# Patient Record
Sex: FEMALE | Race: WHITE | NOT HISPANIC OR LATINO | Employment: OTHER | URBAN - METROPOLITAN AREA
[De-identification: names, ages, dates, MRNs, and addresses within clinical notes are randomized per-mention and may not be internally consistent; named-entity substitution may affect disease eponyms.]

---

## 2017-01-16 ENCOUNTER — COMMUNICATION - HEALTHEAST (OUTPATIENT)
Dept: FAMILY MEDICINE | Facility: CLINIC | Age: 72
End: 2017-01-16

## 2017-01-16 DIAGNOSIS — K21.9 GERD (GASTROESOPHAGEAL REFLUX DISEASE): ICD-10-CM

## 2017-01-17 ENCOUNTER — COMMUNICATION - HEALTHEAST (OUTPATIENT)
Dept: FAMILY MEDICINE | Facility: CLINIC | Age: 72
End: 2017-01-17

## 2017-03-15 ENCOUNTER — COMMUNICATION - HEALTHEAST (OUTPATIENT)
Dept: FAMILY MEDICINE | Facility: CLINIC | Age: 72
End: 2017-03-15

## 2017-03-16 ENCOUNTER — OFFICE VISIT - HEALTHEAST (OUTPATIENT)
Dept: FAMILY MEDICINE | Facility: CLINIC | Age: 72
End: 2017-03-16

## 2017-03-16 DIAGNOSIS — R19.7 DIARRHEA: ICD-10-CM

## 2017-03-16 DIAGNOSIS — Z00.00 HEALTH CARE MAINTENANCE: ICD-10-CM

## 2017-03-16 LAB
CHOLEST SERPL-MCNC: 220 MG/DL
FASTING STATUS PATIENT QL REPORTED: NO
HDLC SERPL-MCNC: 64 MG/DL
LDLC SERPL CALC-MCNC: 143 MG/DL
TRIGL SERPL-MCNC: 64 MG/DL

## 2017-03-20 ENCOUNTER — AMBULATORY - HEALTHEAST (OUTPATIENT)
Dept: LAB | Facility: CLINIC | Age: 72
End: 2017-03-20

## 2017-03-20 ENCOUNTER — COMMUNICATION - HEALTHEAST (OUTPATIENT)
Dept: FAMILY MEDICINE | Facility: CLINIC | Age: 72
End: 2017-03-20

## 2017-03-20 DIAGNOSIS — R19.7 DIARRHEA: ICD-10-CM

## 2017-03-20 DIAGNOSIS — R10.9 ABDOMINAL PAIN: ICD-10-CM

## 2017-03-22 ENCOUNTER — HOSPITAL ENCOUNTER (OUTPATIENT)
Dept: CT IMAGING | Facility: CLINIC | Age: 72
Discharge: HOME | End: 2017-03-22
Attending: FAMILY MEDICINE

## 2017-03-22 ENCOUNTER — COMMUNICATION - HEALTHEAST (OUTPATIENT)
Dept: FAMILY MEDICINE | Facility: CLINIC | Age: 72
End: 2017-03-22

## 2017-03-22 DIAGNOSIS — R19.7 DIARRHEA: ICD-10-CM

## 2017-03-22 DIAGNOSIS — R10.9 ABDOMINAL CRAMPING: ICD-10-CM

## 2017-03-22 DIAGNOSIS — R10.9 ABDOMINAL PAIN: ICD-10-CM

## 2017-03-27 ENCOUNTER — COMMUNICATION - HEALTHEAST (OUTPATIENT)
Dept: FAMILY MEDICINE | Facility: CLINIC | Age: 72
End: 2017-03-27

## 2017-03-30 ENCOUNTER — RECORDS - HEALTHEAST (OUTPATIENT)
Dept: ADMINISTRATIVE | Facility: OTHER | Age: 72
End: 2017-03-30

## 2017-04-03 ENCOUNTER — RECORDS - HEALTHEAST (OUTPATIENT)
Dept: ADMINISTRATIVE | Facility: OTHER | Age: 72
End: 2017-04-03

## 2017-04-09 ENCOUNTER — COMMUNICATION - HEALTHEAST (OUTPATIENT)
Dept: FAMILY MEDICINE | Facility: CLINIC | Age: 72
End: 2017-04-09

## 2017-04-09 DIAGNOSIS — I10 UNSPECIFIED ESSENTIAL HYPERTENSION: ICD-10-CM

## 2017-05-09 ENCOUNTER — COMMUNICATION - HEALTHEAST (OUTPATIENT)
Dept: FAMILY MEDICINE | Facility: CLINIC | Age: 72
End: 2017-05-09

## 2017-05-09 DIAGNOSIS — M81.0 OSTEOPOROSIS: ICD-10-CM

## 2017-05-10 ENCOUNTER — COMMUNICATION - HEALTHEAST (OUTPATIENT)
Dept: FAMILY MEDICINE | Facility: CLINIC | Age: 72
End: 2017-05-10

## 2017-05-18 ENCOUNTER — COMMUNICATION - HEALTHEAST (OUTPATIENT)
Dept: FAMILY MEDICINE | Facility: CLINIC | Age: 72
End: 2017-05-18

## 2017-05-18 ENCOUNTER — COMMUNICATION - HEALTHEAST (OUTPATIENT)
Dept: NURSING | Facility: CLINIC | Age: 72
End: 2017-05-18

## 2017-05-31 ENCOUNTER — OFFICE VISIT - HEALTHEAST (OUTPATIENT)
Dept: FAMILY MEDICINE | Facility: CLINIC | Age: 72
End: 2017-05-31

## 2017-05-31 DIAGNOSIS — Z00.01 ENCOUNTER FOR GENERAL ADULT MEDICAL EXAMINATION WITH ABNORMAL FINDINGS: ICD-10-CM

## 2017-05-31 DIAGNOSIS — H91.90 HEARING LOSS: ICD-10-CM

## 2017-05-31 DIAGNOSIS — I10 UNSPECIFIED ESSENTIAL HYPERTENSION: ICD-10-CM

## 2017-05-31 DIAGNOSIS — F41.9 ANXIETY: ICD-10-CM

## 2017-05-31 DIAGNOSIS — Z00.00 HEALTH CARE MAINTENANCE: ICD-10-CM

## 2017-05-31 DIAGNOSIS — M54.50 LUMBAGO: ICD-10-CM

## 2017-05-31 DIAGNOSIS — D80.3 IGG DEFICIENCY (H): ICD-10-CM

## 2017-05-31 DIAGNOSIS — F32.9 MAJOR DEPRESSION, CHRONIC: ICD-10-CM

## 2017-05-31 DIAGNOSIS — B00.9 HERPES SIMPLEX VIRUS (HSV) INFECTION: ICD-10-CM

## 2017-05-31 DIAGNOSIS — M81.0 OSTEOPOROSIS: ICD-10-CM

## 2017-05-31 DIAGNOSIS — J45.30 MILD PERSISTENT ASTHMA: ICD-10-CM

## 2017-05-31 LAB
CHOLEST SERPL-MCNC: 224 MG/DL
FASTING STATUS PATIENT QL REPORTED: NO
HDLC SERPL-MCNC: 64 MG/DL
IGA SERPL-MCNC: 182 MG/DL (ref 65–400)
IGA SERPL-MCNC: 978 MG/DL (ref 700–1700)
IGM SERPL-MCNC: 13 MG/DL (ref 60–280)
LDLC SERPL CALC-MCNC: 148 MG/DL
TRIGL SERPL-MCNC: 61 MG/DL

## 2017-05-31 ASSESSMENT — MIFFLIN-ST. JEOR: SCORE: 1036.8

## 2017-06-01 ENCOUNTER — COMMUNICATION - HEALTHEAST (OUTPATIENT)
Dept: INTERNAL MEDICINE | Facility: CLINIC | Age: 72
End: 2017-06-01

## 2017-06-16 ENCOUNTER — COMMUNICATION - HEALTHEAST (OUTPATIENT)
Dept: FAMILY MEDICINE | Facility: CLINIC | Age: 72
End: 2017-06-16

## 2017-06-20 ENCOUNTER — COMMUNICATION - HEALTHEAST (OUTPATIENT)
Dept: INTERNAL MEDICINE | Facility: CLINIC | Age: 72
End: 2017-06-20

## 2017-06-20 DIAGNOSIS — M81.0 OSTEOPOROSIS: ICD-10-CM

## 2017-06-21 ENCOUNTER — RECORDS - HEALTHEAST (OUTPATIENT)
Dept: BONE DENSITY | Facility: CLINIC | Age: 72
End: 2017-06-21

## 2017-06-21 ENCOUNTER — OFFICE VISIT - HEALTHEAST (OUTPATIENT)
Dept: INTERNAL MEDICINE | Facility: CLINIC | Age: 72
End: 2017-06-21

## 2017-06-21 DIAGNOSIS — M81.0 AGE-RELATED OSTEOPOROSIS WITHOUT CURRENT PATHOLOGICAL FRACTURE: ICD-10-CM

## 2017-06-21 DIAGNOSIS — M81.0 SENILE OSTEOPOROSIS: ICD-10-CM

## 2017-06-23 ENCOUNTER — COMMUNICATION - HEALTHEAST (OUTPATIENT)
Dept: INTERNAL MEDICINE | Facility: CLINIC | Age: 72
End: 2017-06-23

## 2017-06-29 ENCOUNTER — INFUSION - HEALTHEAST (OUTPATIENT)
Dept: INFUSION THERAPY | Facility: CLINIC | Age: 72
End: 2017-06-29

## 2017-06-29 DIAGNOSIS — M81.0 SENILE OSTEOPOROSIS: ICD-10-CM

## 2017-06-29 DIAGNOSIS — E21.3 HYPERPARATHYROIDISM (H): ICD-10-CM

## 2017-06-29 ASSESSMENT — MIFFLIN-ST. JEOR: SCORE: 1061.06

## 2017-07-18 ENCOUNTER — OFFICE VISIT - HEALTHEAST (OUTPATIENT)
Dept: AUDIOLOGY | Facility: CLINIC | Age: 72
End: 2017-07-18

## 2017-07-18 DIAGNOSIS — H90.3 SENSORINEURAL HEARING LOSS, BILATERAL: ICD-10-CM

## 2017-07-22 ENCOUNTER — COMMUNICATION - HEALTHEAST (OUTPATIENT)
Dept: FAMILY MEDICINE | Facility: CLINIC | Age: 72
End: 2017-07-22

## 2017-07-22 DIAGNOSIS — M79.2 NEUROPATHIC PAIN: ICD-10-CM

## 2017-07-31 ENCOUNTER — OFFICE VISIT - HEALTHEAST (OUTPATIENT)
Dept: FAMILY MEDICINE | Facility: CLINIC | Age: 72
End: 2017-07-31

## 2017-07-31 DIAGNOSIS — M54.16 LUMBAR RADICULOPATHY: ICD-10-CM

## 2017-07-31 DIAGNOSIS — M79.7 FIBROMYALGIA: ICD-10-CM

## 2017-07-31 DIAGNOSIS — F32.9 MAJOR DEPRESSION, CHRONIC: ICD-10-CM

## 2017-07-31 DIAGNOSIS — M54.50 LUMBAGO: ICD-10-CM

## 2017-07-31 DIAGNOSIS — M25.50 PAIN IN JOINT, MULTIPLE SITES: ICD-10-CM

## 2017-07-31 DIAGNOSIS — G89.29 OTHER CHRONIC PAIN: ICD-10-CM

## 2017-09-28 ENCOUNTER — COMMUNICATION - HEALTHEAST (OUTPATIENT)
Dept: FAMILY MEDICINE | Facility: CLINIC | Age: 72
End: 2017-09-28

## 2017-10-02 ENCOUNTER — COMMUNICATION - HEALTHEAST (OUTPATIENT)
Dept: FAMILY MEDICINE | Facility: CLINIC | Age: 72
End: 2017-10-02

## 2017-10-02 ENCOUNTER — RECORDS - HEALTHEAST (OUTPATIENT)
Dept: ADMINISTRATIVE | Facility: OTHER | Age: 72
End: 2017-10-02

## 2017-10-16 ENCOUNTER — RECORDS - HEALTHEAST (OUTPATIENT)
Dept: ADMINISTRATIVE | Facility: OTHER | Age: 72
End: 2017-10-16

## 2017-10-23 ENCOUNTER — RECORDS - HEALTHEAST (OUTPATIENT)
Dept: ADMINISTRATIVE | Facility: OTHER | Age: 72
End: 2017-10-23

## 2017-10-24 ENCOUNTER — COMMUNICATION - HEALTHEAST (OUTPATIENT)
Dept: FAMILY MEDICINE | Facility: CLINIC | Age: 72
End: 2017-10-24

## 2017-10-24 ENCOUNTER — COMMUNICATION - HEALTHEAST (OUTPATIENT)
Dept: SCHEDULING | Facility: CLINIC | Age: 72
End: 2017-10-24

## 2017-10-24 ENCOUNTER — OFFICE VISIT - HEALTHEAST (OUTPATIENT)
Dept: FAMILY MEDICINE | Facility: CLINIC | Age: 72
End: 2017-10-24

## 2017-10-24 DIAGNOSIS — N89.8 VAGINAL DISCHARGE: ICD-10-CM

## 2017-10-24 DIAGNOSIS — N94.819 VULVODYNIA: ICD-10-CM

## 2017-10-24 DIAGNOSIS — B00.9 HSV INFECTION: ICD-10-CM

## 2017-10-25 ENCOUNTER — AMBULATORY - HEALTHEAST (OUTPATIENT)
Dept: FAMILY MEDICINE | Facility: CLINIC | Age: 72
End: 2017-10-25

## 2017-10-25 DIAGNOSIS — N94.819 VULVODYNIA: ICD-10-CM

## 2017-10-30 ENCOUNTER — RECORDS - HEALTHEAST (OUTPATIENT)
Dept: ADMINISTRATIVE | Facility: OTHER | Age: 72
End: 2017-10-30

## 2017-11-07 ENCOUNTER — RECORDS - HEALTHEAST (OUTPATIENT)
Dept: ADMINISTRATIVE | Facility: OTHER | Age: 72
End: 2017-11-07

## 2017-11-08 ENCOUNTER — COMMUNICATION - HEALTHEAST (OUTPATIENT)
Dept: FAMILY MEDICINE | Facility: CLINIC | Age: 72
End: 2017-11-08

## 2017-11-08 DIAGNOSIS — B00.9 HERPES SIMPLEX VIRUS (HSV) INFECTION: ICD-10-CM

## 2017-11-14 ENCOUNTER — COMMUNICATION - HEALTHEAST (OUTPATIENT)
Dept: FAMILY MEDICINE | Facility: CLINIC | Age: 72
End: 2017-11-14

## 2017-11-17 ENCOUNTER — OFFICE VISIT - HEALTHEAST (OUTPATIENT)
Dept: FAMILY MEDICINE | Facility: CLINIC | Age: 72
End: 2017-11-17

## 2017-11-17 DIAGNOSIS — B00.9 HSV INFECTION: ICD-10-CM

## 2017-11-17 DIAGNOSIS — F32.9 MAJOR DEPRESSION, CHRONIC: ICD-10-CM

## 2017-11-17 DIAGNOSIS — F41.9 ANXIETY: ICD-10-CM

## 2017-11-17 DIAGNOSIS — K21.9 GERD (GASTROESOPHAGEAL REFLUX DISEASE): ICD-10-CM

## 2017-11-30 ENCOUNTER — COMMUNICATION - HEALTHEAST (OUTPATIENT)
Dept: FAMILY MEDICINE | Facility: CLINIC | Age: 72
End: 2017-11-30

## 2017-12-07 ENCOUNTER — COMMUNICATION - HEALTHEAST (OUTPATIENT)
Dept: FAMILY MEDICINE | Facility: CLINIC | Age: 72
End: 2017-12-07

## 2018-01-03 ENCOUNTER — RECORDS - HEALTHEAST (OUTPATIENT)
Dept: ADMINISTRATIVE | Facility: OTHER | Age: 73
End: 2018-01-03

## 2018-01-05 ENCOUNTER — RECORDS - HEALTHEAST (OUTPATIENT)
Dept: ADMINISTRATIVE | Facility: OTHER | Age: 73
End: 2018-01-05

## 2018-01-11 ENCOUNTER — RECORDS - HEALTHEAST (OUTPATIENT)
Dept: ADMINISTRATIVE | Facility: OTHER | Age: 73
End: 2018-01-11

## 2018-01-16 ENCOUNTER — RECORDS - HEALTHEAST (OUTPATIENT)
Dept: ADMINISTRATIVE | Facility: OTHER | Age: 73
End: 2018-01-16

## 2018-01-30 ENCOUNTER — COMMUNICATION - HEALTHEAST (OUTPATIENT)
Dept: FAMILY MEDICINE | Facility: CLINIC | Age: 73
End: 2018-01-30

## 2018-01-30 DIAGNOSIS — K21.9 GERD (GASTROESOPHAGEAL REFLUX DISEASE): ICD-10-CM

## 2018-04-11 ENCOUNTER — OFFICE VISIT - HEALTHEAST (OUTPATIENT)
Dept: FAMILY MEDICINE | Facility: CLINIC | Age: 73
End: 2018-04-11

## 2018-04-11 ENCOUNTER — AMBULATORY - HEALTHEAST (OUTPATIENT)
Dept: FAMILY MEDICINE | Facility: CLINIC | Age: 73
End: 2018-04-11

## 2018-04-11 DIAGNOSIS — F41.9 ANXIETY: ICD-10-CM

## 2018-04-11 DIAGNOSIS — F32.9 MAJOR DEPRESSION, CHRONIC: ICD-10-CM

## 2018-04-11 DIAGNOSIS — D80.3 SELECTIVE IGG2 DEFICIENCY (H): ICD-10-CM

## 2018-04-11 DIAGNOSIS — N18.30 CKD (CHRONIC KIDNEY DISEASE) STAGE 3, GFR 30-59 ML/MIN (H): ICD-10-CM

## 2018-04-11 DIAGNOSIS — M54.16 LUMBAR RADICULOPATHY: ICD-10-CM

## 2018-04-11 DIAGNOSIS — M81.0 OSTEOPOROSIS: ICD-10-CM

## 2018-04-11 DIAGNOSIS — N36.2 URETHRAL POLYP: ICD-10-CM

## 2018-04-11 DIAGNOSIS — M79.2 NEUROPATHIC PAIN: ICD-10-CM

## 2018-04-11 DIAGNOSIS — M54.50 LUMBAGO: ICD-10-CM

## 2018-04-11 DIAGNOSIS — I10 ESSENTIAL HYPERTENSION: ICD-10-CM

## 2018-04-11 LAB
ALBUMIN SERPL-MCNC: 4.1 G/DL (ref 3.5–5)
ALP SERPL-CCNC: 57 U/L (ref 45–120)
ALT SERPL W P-5'-P-CCNC: 16 U/L (ref 0–45)
ANION GAP SERPL CALCULATED.3IONS-SCNC: 11 MMOL/L (ref 5–18)
AST SERPL W P-5'-P-CCNC: 23 U/L (ref 0–40)
BILIRUB SERPL-MCNC: 0.5 MG/DL (ref 0–1)
BUN SERPL-MCNC: 13 MG/DL (ref 8–28)
CALCIUM SERPL-MCNC: 9 MG/DL (ref 8.5–10.5)
CHLORIDE BLD-SCNC: 107 MMOL/L (ref 98–107)
CHOLEST SERPL-MCNC: 221 MG/DL
CO2 SERPL-SCNC: 22 MMOL/L (ref 22–31)
CREAT SERPL-MCNC: 0.9 MG/DL (ref 0.6–1.1)
FASTING STATUS PATIENT QL REPORTED: NO
GFR SERPL CREATININE-BSD FRML MDRD: >60 ML/MIN/1.73M2
GLUCOSE BLD-MCNC: 86 MG/DL (ref 70–125)
HDLC SERPL-MCNC: 61 MG/DL
IGA SERPL-MCNC: 168 MG/DL (ref 65–400)
IGA SERPL-MCNC: 881 MG/DL (ref 700–1700)
IGM SERPL-MCNC: 16 MG/DL (ref 60–280)
LDLC SERPL CALC-MCNC: 148 MG/DL
POTASSIUM BLD-SCNC: 4.7 MMOL/L (ref 3.5–5)
PROT SERPL-MCNC: 6.8 G/DL (ref 6–8)
SODIUM SERPL-SCNC: 140 MMOL/L (ref 136–145)
TRIGL SERPL-MCNC: 62 MG/DL

## 2018-04-12 ENCOUNTER — RECORDS - HEALTHEAST (OUTPATIENT)
Dept: ADMINISTRATIVE | Facility: OTHER | Age: 73
End: 2018-04-12

## 2018-04-12 ENCOUNTER — COMMUNICATION - HEALTHEAST (OUTPATIENT)
Dept: FAMILY MEDICINE | Facility: CLINIC | Age: 73
End: 2018-04-12

## 2018-04-12 LAB
25(OH)D3 SERPL-MCNC: 67.8 NG/ML (ref 30–80)
25(OH)D3 SERPL-MCNC: 67.8 NG/ML (ref 30–80)

## 2018-04-17 ENCOUNTER — COMMUNICATION - HEALTHEAST (OUTPATIENT)
Dept: SCHEDULING | Facility: CLINIC | Age: 73
End: 2018-04-17

## 2018-07-16 ENCOUNTER — COMMUNICATION - HEALTHEAST (OUTPATIENT)
Dept: FAMILY MEDICINE | Facility: CLINIC | Age: 73
End: 2018-07-16

## 2018-07-23 ENCOUNTER — RECORDS - HEALTHEAST (OUTPATIENT)
Dept: ADMINISTRATIVE | Facility: OTHER | Age: 73
End: 2018-07-23

## 2018-07-26 ENCOUNTER — COMMUNICATION - HEALTHEAST (OUTPATIENT)
Dept: SCHEDULING | Facility: CLINIC | Age: 73
End: 2018-07-26

## 2018-07-26 DIAGNOSIS — I10 ESSENTIAL HYPERTENSION: ICD-10-CM

## 2018-07-26 DIAGNOSIS — I10 ESSENTIAL (PRIMARY) HYPERTENSION: ICD-10-CM

## 2018-07-27 ENCOUNTER — AMBULATORY - HEALTHEAST (OUTPATIENT)
Dept: LAB | Facility: CLINIC | Age: 73
End: 2018-07-27

## 2018-07-27 DIAGNOSIS — I10 ESSENTIAL HYPERTENSION: ICD-10-CM

## 2018-07-27 LAB
ALBUMIN SERPL-MCNC: 4.2 G/DL (ref 3.5–5)
ALP SERPL-CCNC: 56 U/L (ref 45–120)
ALT SERPL W P-5'-P-CCNC: 15 U/L (ref 0–45)
ANION GAP SERPL CALCULATED.3IONS-SCNC: 9 MMOL/L (ref 5–18)
AST SERPL W P-5'-P-CCNC: 19 U/L (ref 0–40)
BILIRUB SERPL-MCNC: 0.4 MG/DL (ref 0–1)
BUN SERPL-MCNC: 15 MG/DL (ref 8–28)
CALCIUM SERPL-MCNC: 9.2 MG/DL (ref 8.5–10.5)
CHLORIDE BLD-SCNC: 105 MMOL/L (ref 98–107)
CO2 SERPL-SCNC: 25 MMOL/L (ref 22–31)
CREAT SERPL-MCNC: 1.03 MG/DL (ref 0.6–1.1)
GFR SERPL CREATININE-BSD FRML MDRD: 53 ML/MIN/1.73M2
GLUCOSE BLD-MCNC: 84 MG/DL (ref 70–125)
POTASSIUM BLD-SCNC: 5 MMOL/L (ref 3.5–5)
PROT SERPL-MCNC: 6.6 G/DL (ref 6–8)
SODIUM SERPL-SCNC: 139 MMOL/L (ref 136–145)

## 2018-07-31 ENCOUNTER — COMMUNICATION - HEALTHEAST (OUTPATIENT)
Dept: INTERNAL MEDICINE | Facility: CLINIC | Age: 73
End: 2018-07-31

## 2018-08-09 ENCOUNTER — COMMUNICATION - HEALTHEAST (OUTPATIENT)
Dept: FAMILY MEDICINE | Facility: CLINIC | Age: 73
End: 2018-08-09

## 2018-08-10 ENCOUNTER — COMMUNICATION - HEALTHEAST (OUTPATIENT)
Dept: FAMILY MEDICINE | Facility: CLINIC | Age: 73
End: 2018-08-10

## 2018-08-13 ENCOUNTER — RECORDS - HEALTHEAST (OUTPATIENT)
Dept: ADMINISTRATIVE | Facility: OTHER | Age: 73
End: 2018-08-13

## 2018-08-16 ENCOUNTER — OFFICE VISIT - HEALTHEAST (OUTPATIENT)
Dept: INTERNAL MEDICINE | Facility: CLINIC | Age: 73
End: 2018-08-16

## 2018-08-16 ENCOUNTER — RECORDS - HEALTHEAST (OUTPATIENT)
Dept: BONE DENSITY | Facility: CLINIC | Age: 73
End: 2018-08-16

## 2018-08-16 ENCOUNTER — RECORDS - HEALTHEAST (OUTPATIENT)
Dept: ADMINISTRATIVE | Facility: OTHER | Age: 73
End: 2018-08-16

## 2018-08-16 DIAGNOSIS — E21.3 HYPERPARATHYROIDISM (H): ICD-10-CM

## 2018-08-16 DIAGNOSIS — M81.0 AGE-RELATED OSTEOPOROSIS WITHOUT CURRENT PATHOLOGICAL FRACTURE: ICD-10-CM

## 2018-08-16 DIAGNOSIS — M81.0 SENILE OSTEOPOROSIS: ICD-10-CM

## 2018-08-16 ASSESSMENT — MIFFLIN-ST. JEOR: SCORE: 1044.28

## 2018-09-04 ENCOUNTER — COMMUNICATION - HEALTHEAST (OUTPATIENT)
Dept: FAMILY MEDICINE | Facility: CLINIC | Age: 73
End: 2018-09-04

## 2018-09-05 ENCOUNTER — OFFICE VISIT - HEALTHEAST (OUTPATIENT)
Dept: FAMILY MEDICINE | Facility: CLINIC | Age: 73
End: 2018-09-05

## 2018-09-05 DIAGNOSIS — G89.29 OTHER CHRONIC PAIN: ICD-10-CM

## 2018-09-05 DIAGNOSIS — B00.9 HERPES SIMPLEX VIRUS (HSV) INFECTION: ICD-10-CM

## 2018-09-05 DIAGNOSIS — R10.84 ABDOMINAL PAIN, GENERALIZED: ICD-10-CM

## 2018-09-05 DIAGNOSIS — N89.8 VAGINAL ITCHING: ICD-10-CM

## 2018-09-05 DIAGNOSIS — R19.7 DIARRHEA: ICD-10-CM

## 2018-09-05 LAB
ALBUMIN SERPL-MCNC: 4.6 G/DL (ref 3.5–5)
ALP SERPL-CCNC: 49 U/L (ref 45–120)
ALT SERPL W P-5'-P-CCNC: 14 U/L (ref 0–45)
ANION GAP SERPL CALCULATED.3IONS-SCNC: 12 MMOL/L (ref 5–18)
AST SERPL W P-5'-P-CCNC: 19 U/L (ref 0–40)
BILIRUB SERPL-MCNC: 0.5 MG/DL (ref 0–1)
BUN SERPL-MCNC: 11 MG/DL (ref 8–28)
CALCIUM SERPL-MCNC: 9.6 MG/DL (ref 8.5–10.5)
CHLORIDE BLD-SCNC: 104 MMOL/L (ref 98–107)
CO2 SERPL-SCNC: 23 MMOL/L (ref 22–31)
CREAT SERPL-MCNC: 1.06 MG/DL (ref 0.6–1.1)
ERYTHROCYTE [DISTWIDTH] IN BLOOD BY AUTOMATED COUNT: 12.7 % (ref 11–14.5)
GFR SERPL CREATININE-BSD FRML MDRD: 51 ML/MIN/1.73M2
GLUCOSE BLD-MCNC: 97 MG/DL (ref 70–125)
HCT VFR BLD AUTO: 41 % (ref 35–47)
HGB BLD-MCNC: 14 G/DL (ref 12–16)
MCH RBC QN AUTO: 31.8 PG (ref 27–34)
MCHC RBC AUTO-ENTMCNC: 34.1 G/DL (ref 32–36)
MCV RBC AUTO: 93 FL (ref 80–100)
PLATELET # BLD AUTO: 227 THOU/UL (ref 140–440)
PMV BLD AUTO: 7.8 FL (ref 7–10)
POTASSIUM BLD-SCNC: 3.9 MMOL/L (ref 3.5–5)
PROT SERPL-MCNC: 7.2 G/DL (ref 6–8)
RBC # BLD AUTO: 4.39 MILL/UL (ref 3.8–5.4)
SODIUM SERPL-SCNC: 139 MMOL/L (ref 136–145)
WBC: 9.2 THOU/UL (ref 4–11)

## 2018-09-05 ASSESSMENT — MIFFLIN-ST. JEOR: SCORE: 1040.65

## 2018-09-06 ENCOUNTER — OFFICE VISIT - HEALTHEAST (OUTPATIENT)
Dept: PHARMACY | Facility: CLINIC | Age: 73
End: 2018-09-06

## 2018-09-06 DIAGNOSIS — G47.00 INSOMNIA, UNSPECIFIED TYPE: ICD-10-CM

## 2018-09-06 DIAGNOSIS — B00.9 HERPES SIMPLEX VIRUS (HSV) INFECTION: ICD-10-CM

## 2018-09-07 ENCOUNTER — COMMUNICATION - HEALTHEAST (OUTPATIENT)
Dept: FAMILY MEDICINE | Facility: CLINIC | Age: 73
End: 2018-09-07

## 2018-09-10 ENCOUNTER — RECORDS - HEALTHEAST (OUTPATIENT)
Dept: ADMINISTRATIVE | Facility: OTHER | Age: 73
End: 2018-09-10

## 2018-09-10 ENCOUNTER — AMBULATORY - HEALTHEAST (OUTPATIENT)
Dept: PHARMACY | Facility: CLINIC | Age: 73
End: 2018-09-10

## 2018-09-10 DIAGNOSIS — B00.9 HERPES SIMPLEX VIRUS (HSV) INFECTION: ICD-10-CM

## 2018-09-10 RX ORDER — ACYCLOVIR 50 MG/G
OINTMENT TOPICAL
Qty: 15 G | Refills: 1 | Status: SHIPPED | OUTPATIENT
Start: 2018-09-10 | End: 2022-06-20

## 2018-09-11 ENCOUNTER — RECORDS - HEALTHEAST (OUTPATIENT)
Dept: ADMINISTRATIVE | Facility: OTHER | Age: 73
End: 2018-09-11

## 2018-09-11 ENCOUNTER — COMMUNICATION - HEALTHEAST (OUTPATIENT)
Dept: FAMILY MEDICINE | Facility: CLINIC | Age: 73
End: 2018-09-11

## 2018-10-08 ENCOUNTER — COMMUNICATION - HEALTHEAST (OUTPATIENT)
Dept: FAMILY MEDICINE | Facility: CLINIC | Age: 73
End: 2018-10-08

## 2018-10-10 ENCOUNTER — RECORDS - HEALTHEAST (OUTPATIENT)
Dept: ADMINISTRATIVE | Facility: OTHER | Age: 73
End: 2018-10-10

## 2018-10-25 ENCOUNTER — COMMUNICATION - HEALTHEAST (OUTPATIENT)
Dept: SCHEDULING | Facility: CLINIC | Age: 73
End: 2018-10-25

## 2018-10-25 ENCOUNTER — OFFICE VISIT - HEALTHEAST (OUTPATIENT)
Dept: FAMILY MEDICINE | Facility: CLINIC | Age: 73
End: 2018-10-25

## 2018-10-25 DIAGNOSIS — G47.00 INSOMNIA: ICD-10-CM

## 2018-10-25 DIAGNOSIS — B00.1 RECURRENT COLD SORES: ICD-10-CM

## 2018-11-14 ENCOUNTER — OFFICE VISIT - HEALTHEAST (OUTPATIENT)
Dept: FAMILY MEDICINE | Facility: CLINIC | Age: 73
End: 2018-11-14

## 2018-11-14 DIAGNOSIS — F51.01 PRIMARY INSOMNIA: ICD-10-CM

## 2018-11-14 DIAGNOSIS — K21.9 GASTROESOPHAGEAL REFLUX DISEASE WITHOUT ESOPHAGITIS: ICD-10-CM

## 2018-11-14 DIAGNOSIS — F41.9 ANXIETY: ICD-10-CM

## 2018-11-14 DIAGNOSIS — J01.00 ACUTE NON-RECURRENT MAXILLARY SINUSITIS: ICD-10-CM

## 2018-11-14 DIAGNOSIS — B00.1 RECURRENT COLD SORES: ICD-10-CM

## 2018-12-05 ENCOUNTER — OFFICE VISIT - HEALTHEAST (OUTPATIENT)
Dept: FAMILY MEDICINE | Facility: CLINIC | Age: 73
End: 2018-12-05

## 2018-12-05 DIAGNOSIS — R10.11 RUQ ABDOMINAL PAIN: ICD-10-CM

## 2018-12-05 DIAGNOSIS — Z12.31 VISIT FOR SCREENING MAMMOGRAM: ICD-10-CM

## 2018-12-05 DIAGNOSIS — G47.00 INSOMNIA, UNSPECIFIED TYPE: ICD-10-CM

## 2018-12-05 DIAGNOSIS — R10.13 EPIGASTRIC PAIN: ICD-10-CM

## 2018-12-05 LAB
ALBUMIN SERPL-MCNC: 4.3 G/DL (ref 3.5–5)
ALP SERPL-CCNC: 57 U/L (ref 45–120)
ALT SERPL W P-5'-P-CCNC: 18 U/L (ref 0–45)
ANION GAP SERPL CALCULATED.3IONS-SCNC: 11 MMOL/L (ref 5–18)
AST SERPL W P-5'-P-CCNC: 21 U/L (ref 0–40)
BILIRUB DIRECT SERPL-MCNC: 0.1 MG/DL
BILIRUB SERPL-MCNC: 0.3 MG/DL (ref 0–1)
BUN SERPL-MCNC: 10 MG/DL (ref 8–28)
CALCIUM SERPL-MCNC: 9.7 MG/DL (ref 8.5–10.5)
CHLORIDE BLD-SCNC: 106 MMOL/L (ref 98–107)
CO2 SERPL-SCNC: 26 MMOL/L (ref 22–31)
CREAT SERPL-MCNC: 1.1 MG/DL (ref 0.6–1.1)
ERYTHROCYTE [DISTWIDTH] IN BLOOD BY AUTOMATED COUNT: 11.5 % (ref 11–14.5)
GFR SERPL CREATININE-BSD FRML MDRD: 49 ML/MIN/1.73M2
GLUCOSE BLD-MCNC: 97 MG/DL (ref 70–125)
HCT VFR BLD AUTO: 41.7 % (ref 35–47)
HGB BLD-MCNC: 14.3 G/DL (ref 12–16)
LIPASE SERPL-CCNC: 65 U/L (ref 0–52)
MCH RBC QN AUTO: 32.1 PG (ref 27–34)
MCHC RBC AUTO-ENTMCNC: 34.4 G/DL (ref 32–36)
MCV RBC AUTO: 94 FL (ref 80–100)
PLATELET # BLD AUTO: 220 THOU/UL (ref 140–440)
PMV BLD AUTO: 7.7 FL (ref 7–10)
POTASSIUM BLD-SCNC: 4.1 MMOL/L (ref 3.5–5)
PROT SERPL-MCNC: 7 G/DL (ref 6–8)
RBC # BLD AUTO: 4.45 MILL/UL (ref 3.8–5.4)
SODIUM SERPL-SCNC: 143 MMOL/L (ref 136–145)
WBC: 7.6 THOU/UL (ref 4–11)

## 2018-12-06 ENCOUNTER — HOSPITAL ENCOUNTER (OUTPATIENT)
Dept: ULTRASOUND IMAGING | Facility: CLINIC | Age: 73
Discharge: HOME | End: 2018-12-06
Attending: FAMILY MEDICINE

## 2018-12-06 ENCOUNTER — COMMUNICATION - HEALTHEAST (OUTPATIENT)
Dept: FAMILY MEDICINE | Facility: CLINIC | Age: 73
End: 2018-12-06

## 2018-12-06 DIAGNOSIS — R10.11 RUQ ABDOMINAL PAIN: ICD-10-CM

## 2018-12-06 DIAGNOSIS — R10.13 EPIGASTRIC PAIN: ICD-10-CM

## 2018-12-10 ENCOUNTER — COMMUNICATION - HEALTHEAST (OUTPATIENT)
Dept: FAMILY MEDICINE | Facility: CLINIC | Age: 73
End: 2018-12-10

## 2018-12-13 ENCOUNTER — COMMUNICATION - HEALTHEAST (OUTPATIENT)
Dept: FAMILY MEDICINE | Facility: CLINIC | Age: 73
End: 2018-12-13

## 2018-12-13 DIAGNOSIS — K21.9 GERD (GASTROESOPHAGEAL REFLUX DISEASE): ICD-10-CM

## 2018-12-18 ENCOUNTER — RECORDS - HEALTHEAST (OUTPATIENT)
Dept: ADMINISTRATIVE | Facility: OTHER | Age: 73
End: 2018-12-18

## 2018-12-19 ENCOUNTER — RECORDS - HEALTHEAST (OUTPATIENT)
Dept: ADMINISTRATIVE | Facility: OTHER | Age: 73
End: 2018-12-19

## 2018-12-24 ENCOUNTER — HOSPITAL ENCOUNTER (OUTPATIENT)
Dept: MAMMOGRAPHY | Facility: CLINIC | Age: 73
Discharge: HOME | End: 2018-12-24
Attending: FAMILY MEDICINE

## 2018-12-24 DIAGNOSIS — Z12.31 VISIT FOR SCREENING MAMMOGRAM: ICD-10-CM

## 2018-12-30 ENCOUNTER — COMMUNICATION - HEALTHEAST (OUTPATIENT)
Dept: FAMILY MEDICINE | Facility: CLINIC | Age: 73
End: 2018-12-30

## 2019-01-07 ENCOUNTER — COMMUNICATION - HEALTHEAST (OUTPATIENT)
Dept: FAMILY MEDICINE | Facility: CLINIC | Age: 74
End: 2019-01-07

## 2019-01-07 ENCOUNTER — RECORDS - HEALTHEAST (OUTPATIENT)
Dept: ADMINISTRATIVE | Facility: OTHER | Age: 74
End: 2019-01-07

## 2019-01-09 ENCOUNTER — OFFICE VISIT - HEALTHEAST (OUTPATIENT)
Dept: FAMILY MEDICINE | Facility: CLINIC | Age: 74
End: 2019-01-09

## 2019-01-09 DIAGNOSIS — I10 ESSENTIAL HYPERTENSION: ICD-10-CM

## 2019-01-09 DIAGNOSIS — F41.9 ANXIETY: ICD-10-CM

## 2019-01-09 DIAGNOSIS — F51.01 PRIMARY INSOMNIA: ICD-10-CM

## 2019-01-09 DIAGNOSIS — K21.9 GERD (GASTROESOPHAGEAL REFLUX DISEASE): ICD-10-CM

## 2019-03-22 ENCOUNTER — COMMUNICATION - HEALTHEAST (OUTPATIENT)
Dept: FAMILY MEDICINE | Facility: CLINIC | Age: 74
End: 2019-03-22

## 2019-03-22 DIAGNOSIS — N95.1 MENOPAUSAL SYNDROME (HOT FLASHES): ICD-10-CM

## 2019-03-22 DIAGNOSIS — M81.0 AGE-RELATED OSTEOPOROSIS WITHOUT CURRENT PATHOLOGICAL FRACTURE: ICD-10-CM

## 2019-05-13 ENCOUNTER — RECORDS - HEALTHEAST (OUTPATIENT)
Dept: ADMINISTRATIVE | Facility: OTHER | Age: 74
End: 2019-05-13

## 2019-05-20 ENCOUNTER — OFFICE VISIT - HEALTHEAST (OUTPATIENT)
Dept: FAMILY MEDICINE | Facility: CLINIC | Age: 74
End: 2019-05-20

## 2019-05-20 DIAGNOSIS — K21.9 GASTROESOPHAGEAL REFLUX DISEASE WITHOUT ESOPHAGITIS: ICD-10-CM

## 2019-05-20 DIAGNOSIS — I10 ESSENTIAL HYPERTENSION: ICD-10-CM

## 2019-05-20 DIAGNOSIS — M72.2 PLANTAR FASCIITIS: ICD-10-CM

## 2019-05-20 DIAGNOSIS — M54.16 LUMBAR RADICULOPATHY: ICD-10-CM

## 2019-05-20 DIAGNOSIS — F41.9 ANXIETY: ICD-10-CM

## 2019-05-20 DIAGNOSIS — D80.3 SELECTIVE DEFICIENCY OF IGG (H): ICD-10-CM

## 2019-05-20 DIAGNOSIS — F51.01 PRIMARY INSOMNIA: ICD-10-CM

## 2019-05-20 DIAGNOSIS — G89.29 OTHER CHRONIC PAIN: ICD-10-CM

## 2019-05-20 LAB
ANION GAP SERPL CALCULATED.3IONS-SCNC: 9 MMOL/L (ref 5–18)
BUN SERPL-MCNC: 14 MG/DL (ref 8–28)
CALCIUM SERPL-MCNC: 9.7 MG/DL (ref 8.5–10.5)
CHLORIDE BLD-SCNC: 107 MMOL/L (ref 98–107)
CO2 SERPL-SCNC: 24 MMOL/L (ref 22–31)
CREAT SERPL-MCNC: 0.92 MG/DL (ref 0.6–1.1)
GFR SERPL CREATININE-BSD FRML MDRD: 60 ML/MIN/1.73M2
GLUCOSE BLD-MCNC: 97 MG/DL (ref 70–125)
IGA SERPL-MCNC: 166 MG/DL (ref 65–400)
IGA SERPL-MCNC: 976 MG/DL
IGM SERPL-MCNC: 12 MG/DL (ref 60–280)
POTASSIUM BLD-SCNC: 4.7 MMOL/L (ref 3.5–5)
SODIUM SERPL-SCNC: 140 MMOL/L (ref 136–145)

## 2019-05-20 ASSESSMENT — MIFFLIN-ST. JEOR: SCORE: 1037.47

## 2019-06-03 ENCOUNTER — COMMUNICATION - HEALTHEAST (OUTPATIENT)
Dept: FAMILY MEDICINE | Facility: CLINIC | Age: 74
End: 2019-06-03

## 2019-06-03 DIAGNOSIS — M72.2 PLANTAR FASCIITIS: ICD-10-CM

## 2019-06-12 ENCOUNTER — COMMUNICATION - HEALTHEAST (OUTPATIENT)
Dept: OTHER | Facility: CLINIC | Age: 74
End: 2019-06-12

## 2019-06-12 ENCOUNTER — OFFICE VISIT - HEALTHEAST (OUTPATIENT)
Dept: PODIATRY | Facility: CLINIC | Age: 74
End: 2019-06-12

## 2019-06-12 DIAGNOSIS — M72.2 PLANTAR FASCIITIS: ICD-10-CM

## 2019-06-12 ASSESSMENT — MIFFLIN-ST. JEOR: SCORE: 1033.39

## 2019-06-17 ENCOUNTER — COMMUNICATION - HEALTHEAST (OUTPATIENT)
Dept: FAMILY MEDICINE | Facility: CLINIC | Age: 74
End: 2019-06-17

## 2019-06-17 ENCOUNTER — AMBULATORY - HEALTHEAST (OUTPATIENT)
Dept: OTHER | Facility: CLINIC | Age: 74
End: 2019-06-17

## 2019-06-17 DIAGNOSIS — M72.2 PLANTAR FASCIITIS: ICD-10-CM

## 2019-06-19 ENCOUNTER — RECORDS - HEALTHEAST (OUTPATIENT)
Dept: ADMINISTRATIVE | Facility: OTHER | Age: 74
End: 2019-06-19

## 2019-06-24 ENCOUNTER — COMMUNICATION - HEALTHEAST (OUTPATIENT)
Dept: FAMILY MEDICINE | Facility: CLINIC | Age: 74
End: 2019-06-24

## 2019-06-24 DIAGNOSIS — I10 ESSENTIAL HYPERTENSION: ICD-10-CM

## 2019-07-01 ENCOUNTER — AMBULATORY - HEALTHEAST (OUTPATIENT)
Dept: OTHER | Facility: CLINIC | Age: 74
End: 2019-07-01

## 2019-07-10 ENCOUNTER — OFFICE VISIT - HEALTHEAST (OUTPATIENT)
Dept: PODIATRY | Facility: CLINIC | Age: 74
End: 2019-07-10

## 2019-07-10 DIAGNOSIS — M72.2 PLANTAR FASCIITIS: ICD-10-CM

## 2019-07-10 ASSESSMENT — MIFFLIN-ST. JEOR: SCORE: 1033.39

## 2019-07-12 ENCOUNTER — COMMUNICATION - HEALTHEAST (OUTPATIENT)
Dept: PODIATRY | Facility: CLINIC | Age: 74
End: 2019-07-12

## 2019-07-15 ENCOUNTER — COMMUNICATION - HEALTHEAST (OUTPATIENT)
Dept: PODIATRY | Facility: CLINIC | Age: 74
End: 2019-07-15

## 2019-07-15 ENCOUNTER — COMMUNICATION - HEALTHEAST (OUTPATIENT)
Dept: SCHEDULING | Facility: CLINIC | Age: 74
End: 2019-07-15

## 2019-07-15 DIAGNOSIS — Z23 NEED FOR SHINGLES VACCINE: ICD-10-CM

## 2019-07-17 ENCOUNTER — COMMUNICATION - HEALTHEAST (OUTPATIENT)
Dept: FAMILY MEDICINE | Facility: CLINIC | Age: 74
End: 2019-07-17

## 2019-07-29 ENCOUNTER — RECORDS - HEALTHEAST (OUTPATIENT)
Dept: GENERAL RADIOLOGY | Facility: CLINIC | Age: 74
End: 2019-07-29

## 2019-07-29 ENCOUNTER — OFFICE VISIT - HEALTHEAST (OUTPATIENT)
Dept: FAMILY MEDICINE | Facility: CLINIC | Age: 74
End: 2019-07-29

## 2019-07-29 DIAGNOSIS — M25.531 RIGHT WRIST PAIN: ICD-10-CM

## 2019-07-29 DIAGNOSIS — M79.89 BILATERAL SWELLING OF FEET: ICD-10-CM

## 2019-07-29 DIAGNOSIS — K21.9 GERD (GASTROESOPHAGEAL REFLUX DISEASE): ICD-10-CM

## 2019-07-29 DIAGNOSIS — Z11.59 NEED FOR HEPATITIS C SCREENING TEST: ICD-10-CM

## 2019-07-29 DIAGNOSIS — M72.2 PLANTAR FASCIITIS: ICD-10-CM

## 2019-07-29 DIAGNOSIS — M25.531 PAIN IN RIGHT WRIST: ICD-10-CM

## 2019-07-29 DIAGNOSIS — F41.9 ANXIETY: ICD-10-CM

## 2019-07-29 DIAGNOSIS — I10 BENIGN ESSENTIAL HYPERTENSION: ICD-10-CM

## 2019-07-29 LAB
ALBUMIN SERPL-MCNC: 4.1 G/DL (ref 3.5–5)
ALP SERPL-CCNC: 64 U/L (ref 45–120)
ALT SERPL W P-5'-P-CCNC: 12 U/L (ref 0–45)
ANION GAP SERPL CALCULATED.3IONS-SCNC: 9 MMOL/L (ref 5–18)
AST SERPL W P-5'-P-CCNC: 17 U/L (ref 0–40)
BILIRUB SERPL-MCNC: 0.2 MG/DL (ref 0–1)
BUN SERPL-MCNC: 16 MG/DL (ref 8–28)
C REACTIVE PROTEIN LHE: 0.2 MG/DL (ref 0–0.8)
CALCIUM SERPL-MCNC: 9.5 MG/DL (ref 8.5–10.5)
CHLORIDE BLD-SCNC: 108 MMOL/L (ref 98–107)
CHOLEST SERPL-MCNC: 211 MG/DL
CO2 SERPL-SCNC: 25 MMOL/L (ref 22–31)
CREAT SERPL-MCNC: 1.06 MG/DL (ref 0.6–1.1)
ERYTHROCYTE [SEDIMENTATION RATE] IN BLOOD BY WESTERGREN METHOD: 8 MM/HR (ref 0–20)
FASTING STATUS PATIENT QL REPORTED: NO
GFR SERPL CREATININE-BSD FRML MDRD: 51 ML/MIN/1.73M2
GLUCOSE BLD-MCNC: 110 MG/DL (ref 70–125)
HDLC SERPL-MCNC: 64 MG/DL
LDLC SERPL CALC-MCNC: 136 MG/DL
POTASSIUM BLD-SCNC: 4.6 MMOL/L (ref 3.5–5)
PROT SERPL-MCNC: 6.6 G/DL (ref 6–8)
SODIUM SERPL-SCNC: 142 MMOL/L (ref 136–145)
TRIGL SERPL-MCNC: 53 MG/DL

## 2019-07-29 RX ORDER — CLONAZEPAM 0.5 MG/1
0.5 TABLET ORAL 2 TIMES DAILY PRN
Qty: 60 TABLET | Refills: 0 | Status: SHIPPED | OUTPATIENT
Start: 2019-07-29 | End: 2021-08-17

## 2019-07-29 ASSESSMENT — MIFFLIN-ST. JEOR: SCORE: 1067.41

## 2019-07-30 LAB
ANA SER QL: 0.3 U
HCV AB SERPL QL IA: NEGATIVE

## 2019-10-21 ENCOUNTER — OFFICE VISIT - HEALTHEAST (OUTPATIENT)
Dept: FAMILY MEDICINE | Facility: CLINIC | Age: 74
End: 2019-10-21

## 2019-10-21 DIAGNOSIS — M25.532 PAIN IN BOTH WRISTS: ICD-10-CM

## 2019-10-21 DIAGNOSIS — M25.531 PAIN IN BOTH WRISTS: ICD-10-CM

## 2019-10-21 DIAGNOSIS — F51.01 PRIMARY INSOMNIA: ICD-10-CM

## 2019-10-21 DIAGNOSIS — J45.20 MILD INTERMITTENT ASTHMA WITHOUT COMPLICATION: ICD-10-CM

## 2019-12-31 ENCOUNTER — COMMUNICATION - HEALTHEAST (OUTPATIENT)
Dept: FAMILY MEDICINE | Facility: CLINIC | Age: 74
End: 2019-12-31

## 2019-12-31 DIAGNOSIS — F41.9 ANXIETY: ICD-10-CM

## 2020-03-26 ENCOUNTER — COMMUNICATION - HEALTHEAST (OUTPATIENT)
Dept: FAMILY MEDICINE | Facility: CLINIC | Age: 75
End: 2020-03-26

## 2020-03-26 DIAGNOSIS — Z79.890 CURRENT LONG-TERM USE OF POSTMENOPAUSAL HORMONE REPLACEMENT THERAPY: ICD-10-CM

## 2020-06-01 ENCOUNTER — COMMUNICATION - HEALTHEAST (OUTPATIENT)
Dept: FAMILY MEDICINE | Facility: CLINIC | Age: 75
End: 2020-06-01

## 2020-06-01 DIAGNOSIS — I10 ESSENTIAL HYPERTENSION: ICD-10-CM

## 2020-06-09 ENCOUNTER — RECORDS - HEALTHEAST (OUTPATIENT)
Dept: ADMINISTRATIVE | Facility: OTHER | Age: 75
End: 2020-06-09

## 2020-06-26 ENCOUNTER — COMMUNICATION - HEALTHEAST (OUTPATIENT)
Dept: FAMILY MEDICINE | Facility: CLINIC | Age: 75
End: 2020-06-26

## 2020-06-26 DIAGNOSIS — F41.9 ANXIETY: ICD-10-CM

## 2020-06-29 RX ORDER — BUSPIRONE HYDROCHLORIDE 15 MG/1
TABLET ORAL
Qty: 270 TABLET | Refills: 3 | Status: SHIPPED | OUTPATIENT
Start: 2020-06-29 | End: 2021-09-10

## 2020-07-06 ENCOUNTER — OFFICE VISIT - HEALTHEAST (OUTPATIENT)
Dept: FAMILY MEDICINE | Facility: CLINIC | Age: 75
End: 2020-07-06

## 2020-07-06 DIAGNOSIS — E78.2 MIXED HYPERLIPIDEMIA: ICD-10-CM

## 2020-07-06 DIAGNOSIS — D80.3 SELECTIVE DEFICIENCY OF IGG (H): ICD-10-CM

## 2020-07-06 DIAGNOSIS — I10 ESSENTIAL HYPERTENSION: ICD-10-CM

## 2020-07-06 RX ORDER — FAMOTIDINE 10 MG
20 TABLET ORAL DAILY
Status: SHIPPED | COMMUNITY
Start: 2020-07-06 | End: 2023-07-21

## 2020-07-14 ENCOUNTER — AMBULATORY - HEALTHEAST (OUTPATIENT)
Dept: LAB | Facility: CLINIC | Age: 75
End: 2020-07-14

## 2020-07-14 DIAGNOSIS — E78.2 MIXED HYPERLIPIDEMIA: ICD-10-CM

## 2020-07-14 DIAGNOSIS — D80.3 SELECTIVE DEFICIENCY OF IGG (H): ICD-10-CM

## 2020-07-14 DIAGNOSIS — I10 ESSENTIAL HYPERTENSION: ICD-10-CM

## 2020-07-14 LAB
ALBUMIN SERPL-MCNC: 4.1 G/DL (ref 3.5–5)
ALP SERPL-CCNC: 57 U/L (ref 45–120)
ALT SERPL W P-5'-P-CCNC: 16 U/L (ref 0–45)
ANION GAP SERPL CALCULATED.3IONS-SCNC: 5 MMOL/L (ref 5–18)
AST SERPL W P-5'-P-CCNC: 16 U/L (ref 0–40)
BILIRUB SERPL-MCNC: 0.5 MG/DL (ref 0–1)
BUN SERPL-MCNC: 13 MG/DL (ref 8–28)
CALCIUM SERPL-MCNC: 9.7 MG/DL (ref 8.5–10.5)
CHLORIDE BLD-SCNC: 103 MMOL/L (ref 98–107)
CHOLEST SERPL-MCNC: 212 MG/DL
CO2 SERPL-SCNC: 26 MMOL/L (ref 22–31)
CREAT SERPL-MCNC: 1.26 MG/DL (ref 0.6–1.1)
FASTING STATUS PATIENT QL REPORTED: YES
GFR SERPL CREATININE-BSD FRML MDRD: 42 ML/MIN/1.73M2
GLUCOSE BLD-MCNC: 95 MG/DL (ref 70–125)
HDLC SERPL-MCNC: 61 MG/DL
IGA SERPL-MCNC: 110 MG/DL (ref 65–400)
IGA SERPL-MCNC: 959 MG/DL
IGM SERPL-MCNC: 15 MG/DL (ref 60–280)
LDLC SERPL CALC-MCNC: 139 MG/DL
POTASSIUM BLD-SCNC: 4.2 MMOL/L (ref 3.5–5)
PROT SERPL-MCNC: 6.8 G/DL (ref 6–8)
SODIUM SERPL-SCNC: 134 MMOL/L (ref 136–145)
TRIGL SERPL-MCNC: 58 MG/DL

## 2020-08-13 ENCOUNTER — OFFICE VISIT - HEALTHEAST (OUTPATIENT)
Dept: FAMILY MEDICINE | Facility: CLINIC | Age: 75
End: 2020-08-13

## 2020-08-13 DIAGNOSIS — Z00.00 ROUTINE GENERAL MEDICAL EXAMINATION AT A HEALTH CARE FACILITY: ICD-10-CM

## 2020-08-13 DIAGNOSIS — K21.9 GASTROESOPHAGEAL REFLUX DISEASE WITHOUT ESOPHAGITIS: ICD-10-CM

## 2020-08-13 DIAGNOSIS — M81.0 SENILE OSTEOPOROSIS: ICD-10-CM

## 2020-08-13 DIAGNOSIS — M54.50 CHRONIC LOW BACK PAIN, UNSPECIFIED BACK PAIN LATERALITY, UNSPECIFIED WHETHER SCIATICA PRESENT: ICD-10-CM

## 2020-08-13 DIAGNOSIS — I10 ESSENTIAL HYPERTENSION: ICD-10-CM

## 2020-08-13 DIAGNOSIS — D80.3 SELECTIVE DEFICIENCY OF IGG (H): ICD-10-CM

## 2020-08-13 DIAGNOSIS — J45.20 MILD INTERMITTENT ASTHMA WITHOUT COMPLICATION: ICD-10-CM

## 2020-08-13 DIAGNOSIS — F41.9 ANXIETY: ICD-10-CM

## 2020-08-13 DIAGNOSIS — B00.1 RECURRENT COLD SORES: ICD-10-CM

## 2020-08-13 DIAGNOSIS — G89.29 CHRONIC LOW BACK PAIN, UNSPECIFIED BACK PAIN LATERALITY, UNSPECIFIED WHETHER SCIATICA PRESENT: ICD-10-CM

## 2020-08-13 DIAGNOSIS — Z79.890 NEED FOR PROPHYLACTIC HORMONE REPLACEMENT THERAPY (POSTMENOPAUSAL): ICD-10-CM

## 2020-08-13 DIAGNOSIS — M79.7 FIBROMYALGIA: ICD-10-CM

## 2020-08-13 DIAGNOSIS — F51.01 PRIMARY INSOMNIA: ICD-10-CM

## 2020-08-13 LAB
ANION GAP SERPL CALCULATED.3IONS-SCNC: 10 MMOL/L (ref 5–18)
BUN SERPL-MCNC: 12 MG/DL (ref 8–28)
CALCIUM SERPL-MCNC: 8.7 MG/DL (ref 8.5–10.5)
CHLORIDE BLD-SCNC: 107 MMOL/L (ref 98–107)
CO2 SERPL-SCNC: 24 MMOL/L (ref 22–31)
CREAT SERPL-MCNC: 1.02 MG/DL (ref 0.6–1.1)
GFR SERPL CREATININE-BSD FRML MDRD: 53 ML/MIN/1.73M2
GLUCOSE BLD-MCNC: 84 MG/DL (ref 70–125)
POTASSIUM BLD-SCNC: 4.4 MMOL/L (ref 3.5–5)
SODIUM SERPL-SCNC: 141 MMOL/L (ref 136–145)

## 2020-08-13 ASSESSMENT — ANXIETY QUESTIONNAIRES
6. BECOMING EASILY ANNOYED OR IRRITABLE: NOT AT ALL
3. WORRYING TOO MUCH ABOUT DIFFERENT THINGS: SEVERAL DAYS
GAD7 TOTAL SCORE: 5
7. FEELING AFRAID AS IF SOMETHING AWFUL MIGHT HAPPEN: SEVERAL DAYS
5. BEING SO RESTLESS THAT IT IS HARD TO SIT STILL: NOT AT ALL
4. TROUBLE RELAXING: SEVERAL DAYS
7. FEELING AFRAID AS IF SOMETHING AWFUL MIGHT HAPPEN: SEVERAL DAYS
2. NOT BEING ABLE TO STOP OR CONTROL WORRYING: SEVERAL DAYS
4. TROUBLE RELAXING: SEVERAL DAYS
3. WORRYING TOO MUCH ABOUT DIFFERENT THINGS: SEVERAL DAYS
5. BEING SO RESTLESS THAT IT IS HARD TO SIT STILL: NOT AT ALL
6. BECOMING EASILY ANNOYED OR IRRITABLE: NOT AT ALL
1. FEELING NERVOUS, ANXIOUS, OR ON EDGE: SEVERAL DAYS

## 2020-08-13 ASSESSMENT — PATIENT HEALTH QUESTIONNAIRE - PHQ9: SUM OF ALL RESPONSES TO PHQ QUESTIONS 1-9: 7

## 2020-08-13 ASSESSMENT — MIFFLIN-ST. JEOR: SCORE: 1082.44

## 2020-08-21 ENCOUNTER — COMMUNICATION - HEALTHEAST (OUTPATIENT)
Dept: VASCULAR SURGERY | Facility: CLINIC | Age: 75
End: 2020-08-21

## 2020-08-31 ENCOUNTER — COMMUNICATION - HEALTHEAST (OUTPATIENT)
Dept: FAMILY MEDICINE | Facility: CLINIC | Age: 75
End: 2020-08-31

## 2020-08-31 ENCOUNTER — OFFICE VISIT - HEALTHEAST (OUTPATIENT)
Dept: PODIATRY | Facility: CLINIC | Age: 75
End: 2020-08-31

## 2020-08-31 DIAGNOSIS — M72.2 PLANTAR FASCIITIS: ICD-10-CM

## 2020-08-31 DIAGNOSIS — F51.01 PRIMARY INSOMNIA: ICD-10-CM

## 2020-09-02 ENCOUNTER — COMMUNICATION - HEALTHEAST (OUTPATIENT)
Dept: OTHER | Facility: CLINIC | Age: 75
End: 2020-09-02

## 2020-09-10 ENCOUNTER — HOSPITAL ENCOUNTER (OUTPATIENT)
Dept: MAMMOGRAPHY | Facility: CLINIC | Age: 75
Discharge: HOME | End: 2020-09-10
Attending: FAMILY MEDICINE

## 2020-09-10 DIAGNOSIS — Z12.31 VISIT FOR SCREENING MAMMOGRAM: ICD-10-CM

## 2020-09-28 ENCOUNTER — RECORDS - HEALTHEAST (OUTPATIENT)
Dept: ADMINISTRATIVE | Facility: OTHER | Age: 75
End: 2020-09-28

## 2020-10-07 ENCOUNTER — COMMUNICATION - HEALTHEAST (OUTPATIENT)
Dept: FAMILY MEDICINE | Facility: CLINIC | Age: 75
End: 2020-10-07

## 2020-10-07 ENCOUNTER — COMMUNICATION - HEALTHEAST (OUTPATIENT)
Dept: SCHEDULING | Facility: CLINIC | Age: 75
End: 2020-10-07

## 2020-10-08 ENCOUNTER — OFFICE VISIT - HEALTHEAST (OUTPATIENT)
Dept: FAMILY MEDICINE | Facility: CLINIC | Age: 75
End: 2020-10-08

## 2020-10-08 DIAGNOSIS — I10 BENIGN ESSENTIAL HYPERTENSION: ICD-10-CM

## 2020-10-08 DIAGNOSIS — R00.0 TACHYCARDIA: ICD-10-CM

## 2020-10-08 LAB
ANION GAP SERPL CALCULATED.3IONS-SCNC: 10 MMOL/L (ref 5–18)
BUN SERPL-MCNC: 21 MG/DL (ref 8–28)
CALCIUM SERPL-MCNC: 9.4 MG/DL (ref 8.5–10.5)
CHLORIDE BLD-SCNC: 102 MMOL/L (ref 98–107)
CO2 SERPL-SCNC: 26 MMOL/L (ref 22–31)
CREAT SERPL-MCNC: 1.11 MG/DL (ref 0.6–1.1)
ERYTHROCYTE [DISTWIDTH] IN BLOOD BY AUTOMATED COUNT: 12.7 % (ref 11–14.5)
GFR SERPL CREATININE-BSD FRML MDRD: 48 ML/MIN/1.73M2
GLUCOSE BLD-MCNC: 87 MG/DL (ref 70–125)
HCT VFR BLD AUTO: 43.1 % (ref 35–47)
HGB BLD-MCNC: 14.2 G/DL (ref 12–16)
MCH RBC QN AUTO: 31.8 PG (ref 27–34)
MCHC RBC AUTO-ENTMCNC: 32.9 G/DL (ref 32–36)
MCV RBC AUTO: 97 FL (ref 80–100)
PLATELET # BLD AUTO: 249 THOU/UL (ref 140–440)
PMV BLD AUTO: 7.8 FL (ref 7–10)
POTASSIUM BLD-SCNC: 4.6 MMOL/L (ref 3.5–5)
RBC # BLD AUTO: 4.46 MILL/UL (ref 3.8–5.4)
SODIUM SERPL-SCNC: 138 MMOL/L (ref 136–145)
TSH SERPL DL<=0.005 MIU/L-ACNC: 2.55 UIU/ML (ref 0.3–5)
WBC: 11.9 THOU/UL (ref 4–11)

## 2020-10-09 LAB
ATRIAL RATE - MUSE: 117 BPM
DIASTOLIC BLOOD PRESSURE - MUSE: NORMAL
INTERPRETATION ECG - MUSE: NORMAL
P AXIS - MUSE: 57 DEGREES
PR INTERVAL - MUSE: 152 MS
QRS DURATION - MUSE: 68 MS
QT - MUSE: 306 MS
QTC - MUSE: 426 MS
R AXIS - MUSE: 46 DEGREES
SYSTOLIC BLOOD PRESSURE - MUSE: NORMAL
T AXIS - MUSE: 57 DEGREES
VENTRICULAR RATE- MUSE: 117 BPM

## 2020-10-13 ENCOUNTER — COMMUNICATION - HEALTHEAST (OUTPATIENT)
Dept: FAMILY MEDICINE | Facility: CLINIC | Age: 75
End: 2020-10-13

## 2020-10-21 ENCOUNTER — COMMUNICATION - HEALTHEAST (OUTPATIENT)
Dept: FAMILY MEDICINE | Facility: CLINIC | Age: 75
End: 2020-10-21

## 2020-10-21 DIAGNOSIS — F51.01 PRIMARY INSOMNIA: ICD-10-CM

## 2020-11-02 ENCOUNTER — RECORDS - HEALTHEAST (OUTPATIENT)
Dept: ADMINISTRATIVE | Facility: OTHER | Age: 75
End: 2020-11-02

## 2020-11-23 ENCOUNTER — COMMUNICATION - HEALTHEAST (OUTPATIENT)
Dept: FAMILY MEDICINE | Facility: CLINIC | Age: 75
End: 2020-11-23

## 2020-11-23 DIAGNOSIS — J45.20 MILD INTERMITTENT ASTHMA WITHOUT COMPLICATION: ICD-10-CM

## 2020-11-24 RX ORDER — ALBUTEROL SULFATE 90 UG/1
AEROSOL, METERED RESPIRATORY (INHALATION)
Qty: 18 G | Refills: 4 | Status: SHIPPED | OUTPATIENT
Start: 2020-11-24 | End: 2021-10-27

## 2020-12-10 ENCOUNTER — COMMUNICATION - HEALTHEAST (OUTPATIENT)
Dept: FAMILY MEDICINE | Facility: CLINIC | Age: 75
End: 2020-12-10

## 2020-12-10 DIAGNOSIS — Z79.890 CURRENT LONG-TERM USE OF POSTMENOPAUSAL HORMONE REPLACEMENT THERAPY: ICD-10-CM

## 2021-05-10 ENCOUNTER — OFFICE VISIT - HEALTHEAST (OUTPATIENT)
Dept: FAMILY MEDICINE | Facility: CLINIC | Age: 76
End: 2021-05-10

## 2021-05-10 DIAGNOSIS — D80.3 SELECTIVE DEFICIENCY OF IGG (H): ICD-10-CM

## 2021-05-10 DIAGNOSIS — M25.50 POLYARTHRALGIA: ICD-10-CM

## 2021-05-10 DIAGNOSIS — M81.0 AGE-RELATED OSTEOPOROSIS WITHOUT CURRENT PATHOLOGICAL FRACTURE: ICD-10-CM

## 2021-05-10 DIAGNOSIS — N18.30 STAGE 3 CHRONIC KIDNEY DISEASE, UNSPECIFIED WHETHER STAGE 3A OR 3B CKD (H): ICD-10-CM

## 2021-05-10 DIAGNOSIS — E78.2 MIXED HYPERLIPIDEMIA: ICD-10-CM

## 2021-05-10 DIAGNOSIS — Z79.890 CURRENT LONG-TERM USE OF POSTMENOPAUSAL HORMONE REPLACEMENT THERAPY: ICD-10-CM

## 2021-05-10 LAB
ANION GAP SERPL CALCULATED.3IONS-SCNC: 10 MMOL/L (ref 5–18)
BASOPHILS # BLD AUTO: 0.1 THOU/UL (ref 0–0.2)
BASOPHILS NFR BLD AUTO: 1 % (ref 0–2)
BUN SERPL-MCNC: 15 MG/DL (ref 8–28)
C REACTIVE PROTEIN LHE: 0.3 MG/DL (ref 0–0.8)
CALCIUM SERPL-MCNC: 9.1 MG/DL (ref 8.5–10.5)
CHLORIDE BLD-SCNC: 107 MMOL/L (ref 98–107)
CHOLEST SERPL-MCNC: 208 MG/DL
CO2 SERPL-SCNC: 24 MMOL/L (ref 22–31)
CREAT SERPL-MCNC: 0.94 MG/DL (ref 0.6–1.1)
EOSINOPHIL # BLD AUTO: 0.1 THOU/UL (ref 0–0.4)
EOSINOPHIL NFR BLD AUTO: 2 % (ref 0–6)
ERYTHROCYTE [DISTWIDTH] IN BLOOD BY AUTOMATED COUNT: 14.5 % (ref 11–14.5)
ERYTHROCYTE [SEDIMENTATION RATE] IN BLOOD BY WESTERGREN METHOD: 8 MM/HR (ref 0–20)
FASTING STATUS PATIENT QL REPORTED: YES
GFR SERPL CREATININE-BSD FRML MDRD: 58 ML/MIN/1.73M2
GLUCOSE BLD-MCNC: 95 MG/DL (ref 70–125)
HCT VFR BLD AUTO: 39.8 % (ref 35–47)
HDLC SERPL-MCNC: 60 MG/DL
HGB BLD-MCNC: 13.5 G/DL (ref 12–16)
IMM GRANULOCYTES # BLD: 0 THOU/UL
IMM GRANULOCYTES NFR BLD: 0 %
LDLC SERPL CALC-MCNC: 136 MG/DL
LYMPHOCYTES # BLD AUTO: 1.9 THOU/UL (ref 0.8–4.4)
LYMPHOCYTES NFR BLD AUTO: 23 % (ref 20–40)
MCH RBC QN AUTO: 31.5 PG (ref 27–34)
MCHC RBC AUTO-ENTMCNC: 33.9 G/DL (ref 32–36)
MCV RBC AUTO: 93 FL (ref 80–100)
MONOCYTES # BLD AUTO: 0.6 THOU/UL (ref 0–0.9)
MONOCYTES NFR BLD AUTO: 7 % (ref 2–10)
NEUTROPHILS # BLD AUTO: 5.4 THOU/UL (ref 2–7.7)
NEUTROPHILS NFR BLD AUTO: 67 % (ref 50–70)
PLATELET # BLD AUTO: 236 THOU/UL (ref 140–440)
PMV BLD AUTO: 9.7 FL (ref 7–10)
POTASSIUM BLD-SCNC: 4.2 MMOL/L (ref 3.5–5)
RBC # BLD AUTO: 4.29 MILL/UL (ref 3.8–5.4)
RHEUMATOID FACT SERPL-ACNC: <15 IU/ML (ref 0–30)
SODIUM SERPL-SCNC: 141 MMOL/L (ref 136–145)
TRIGL SERPL-MCNC: 60 MG/DL
WBC: 8.1 THOU/UL (ref 4–11)

## 2021-05-11 LAB
25(OH)D3 SERPL-MCNC: 51.5 NG/ML (ref 30–80)
25(OH)D3 SERPL-MCNC: 51.5 NG/ML (ref 30–80)
CCP AB SER IA-ACNC: <0.5 U/ML
IGA SERPL-MCNC: 200 MG/DL (ref 65–400)
IGA SERPL-MCNC: 901 MG/DL
IGM SERPL-MCNC: 15 MG/DL (ref 60–280)

## 2021-05-12 ENCOUNTER — COMMUNICATION - HEALTHEAST (OUTPATIENT)
Dept: FAMILY MEDICINE | Facility: CLINIC | Age: 76
End: 2021-05-12

## 2021-05-14 ENCOUNTER — COMMUNICATION - HEALTHEAST (OUTPATIENT)
Dept: FAMILY MEDICINE | Facility: CLINIC | Age: 76
End: 2021-05-14

## 2021-05-14 DIAGNOSIS — Z79.890 CURRENT LONG-TERM USE OF POSTMENOPAUSAL HORMONE REPLACEMENT THERAPY: ICD-10-CM

## 2021-05-14 RX ORDER — ESTRADIOL AND NORETHINDRONE ACETATE 1; .5 MG/1; MG/1
1 TABLET ORAL DAILY
Qty: 84 TABLET | Refills: 3 | Status: SHIPPED | OUTPATIENT
Start: 2021-05-14 | End: 2022-05-09

## 2021-05-26 ENCOUNTER — RECORDS - HEALTHEAST (OUTPATIENT)
Dept: ADMINISTRATIVE | Facility: CLINIC | Age: 76
End: 2021-05-26

## 2021-05-27 ENCOUNTER — RECORDS - HEALTHEAST (OUTPATIENT)
Dept: ADMINISTRATIVE | Facility: CLINIC | Age: 76
End: 2021-05-27

## 2021-05-27 VITALS
TEMPERATURE: 99.4 F | SYSTOLIC BLOOD PRESSURE: 120 MMHG | HEART RATE: 89 BPM | WEIGHT: 143.13 LBS | DIASTOLIC BLOOD PRESSURE: 70 MMHG | OXYGEN SATURATION: 98 %

## 2021-05-27 ASSESSMENT — PATIENT HEALTH QUESTIONNAIRE - PHQ9: SUM OF ALL RESPONSES TO PHQ QUESTIONS 1-9: 7

## 2021-05-28 ENCOUNTER — RECORDS - HEALTHEAST (OUTPATIENT)
Dept: ADMINISTRATIVE | Facility: CLINIC | Age: 76
End: 2021-05-28

## 2021-05-28 ASSESSMENT — ANXIETY QUESTIONNAIRES: GAD7 TOTAL SCORE: 5

## 2021-05-28 NOTE — PROGRESS NOTES
Assessment/Plan:     1. Anxiety  busPIRone (BUSPAR) 15 MG tablet   2. Hypertension  Basic Metabolic Panel   3. Selective deficiency of IgG (H)  Immunoglobulins IgG, IgA, IgM   4. Plantar fasciitis     5. Gastroesophageal reflux disease without esophagitis     6. Chronic Pain     7. Primary insomnia     8. Lumbar radiculopathy         Diagnoses and all orders for this visit:    Anxiety  -     busPIRone (BUSPAR) 15 MG tablet; Take 1.5 tablets (22.5 mg total) by mouth 2 (two) times a day.  Dispense: 90 tablet; Refill: 3  -She will continue buspirone.  She rarely uses clonazepam    Hypertension  -     Basic Metabolic Panel  -Controlled with current medication.    Selective deficiency of IgG (H)  -     Immunoglobulins IgG, IgA, IgM    Plantar fasciitis  Discussed treatment measures.  Provided patient information handout.  Discussed stretching exercises and plantar fasciitis braces.  If not improving, she will contact me for referral to physical therapy.  Also discussed importance of frequent icing    Gastroesophageal reflux disease without esophagitis  She is not taking Dexilant.  She will continue to follow-up with GI specialist    Chronic Pain  She is titrating up on amitriptyline.  Not taking 25 mg at bedtime    Primary insomnia  Doing much better.  Taking amitriptyline 25 mg at bedtime.  Rarely uses clonazepam    Lumbar radiculopathy  Continue amitriptyline 25 mg daily.  Application for handicap parking sticker completed                  Subjective:      Nicole Marcial is a 74 y.o. female who comes in today for follow-up and med check.  She was last seen in January.  Recently returned from [place].  States that her stress levels are improving.  Was having a lot of stress associated with building a house down in [place].  Also a lot of stress with her 99-year-old mother-in-law.  They are continuing to work to put her into a long-term care facility.  She did see a doctor down in [place] to establish care so that  she has a physician she is able to see when she is down there.  States that this physician did perform some basic blood work and everything came back normal except her GFR was little bit reduced.  She is wondering if she could check that again today.  She has noticed that the GFR tends to be lower when she is in [place] and she wonders if it is because the air is drier than it is in [place].  Creatinine has been normal.  She recently saw her gastroenterologist.  She is now on Dexilant in place of Protonix for acid reflux.  She is not sure if it is helping any more than the Protonix.  She was getting headaches with the Protonix.  She will follow-up with her GI specialist about this.  We reviewed and updated her medications.  No other significant changes.  She did see her dentist to perform an x-ray on her teeth.  Everything looked good but it was noted she had a lot of sinus congestion on x-ray.  She wishes to avoid antibiotics.  She is wondering about over-the-counter medications that would be safe to use to help relieve congestion.  She is wanting a refill on handicap parking sticker.  She has chronic lower back pain and lumbar radiculopathy.  Occasionally needs to use a cane for ambulation.  Has difficulty walking long distances due to pain.  She continues on buspirone for anxiety.  Rarely needs to use clonazepam.  Was having significant insomnia a few months ago but that is doing much better.  She rarely uses clonazepam now at night to help with sleep and when she does it is very effective and she is happy about that.  She has history of mild IgG deficiency.  It has previously been recommended she have these lab checked on a yearly basis.  She is willing to do that today.  She is also reporting a lot of pain in the bottom of her right heel, particularly when she gets up out of bed first thing in the morning or when she gets up and steps down after she has been sitting down for a period of time.  She did try  doing some stretching exercises but this seems to aggravate her right hip.  She has not really consistently tried icing.  She has not tried any plantar fasciitis braces.  Review of systems is otherwise negative.    Current Outpatient Medications   Medication Sig Dispense Refill     acyclovir (ZOVIRAX) 5 % ointment Apply up to 5 times daily topically 15 g 1     albuterol (PROAIR HFA;PROVENTIL HFA;VENTOLIN HFA) 90 mcg/actuation inhaler Inhale 2 puffs every 6 (six) hours as needed for wheezing or shortness of breath. 1 Inhaler 5     amLODIPine (NORVASC) 5 MG tablet TAKE ONE TABLET BY MOUTH EVERY DAY 90 tablet 3     busPIRone (BUSPAR) 15 MG tablet Take 1.5 tablets (22.5 mg total) by mouth 2 (two) times a day. 90 tablet 3     cholecalciferol, vitamin D3, 1,000 unit tablet Take 2,000 Units by mouth daily.        clonazePAM (KLONOPIN) 0.5 MG tablet Take 1 tablet (0.5 mg total) by mouth 2 (two) times a day as needed for anxiety. 60 tablet 0     ESTRACE 0.01 % (0.1 mg/gram) vaginal cream   3     estradiol (ESTRACE) 0.5 MG tablet Take 0.5 mg by mouth.       estradiol-norethindrone (ACTIVELLA) 1-0.5 mg per tablet Take 1 tablet by mouth daily. 90 tablet 3     loratadine (CLARITIN) 10 mg tablet Take 10 mg by mouth daily.       ranitidine (ZANTAC) 300 MG tablet Take 1/2 tablet by mouth at  tablet 3     amitriptyline (ELAVIL) 25 MG tablet Take 25 mg by mouth.  5     No current facility-administered medications for this visit.        Past Medical History, Family History, and Social History reviewed.  Past Medical History:   Diagnosis Date     Back pain      Chronic pain      Eczema      Fibromyalgia      Hypertension      Mild asthma      Nerve pain      Osteoporosis      Past Surgical History:   Procedure Laterality Date     BACK SURGERY      x3     BUNIONECTOMY       parathyroidectomy N/A 08/16/2016    Dr. Godoy     SHOULDER SURGERY       SINUS SURGERY       TONSILLECTOMY       TRIGGER FINGER RELEASE       Adhesive;  Aspirin (tartrazine only); Cefuroxime axetil; Cephalosporins; Chloral hydrate analogues; Chlorhexidine gluconate; Clindamycin; Codeine; Dairy; Doxepin; Ergot alkaloids; Erythromycin base; Gluten; Hydroxyzine; Latex; Morphine; Nickel; Nortriptyline; Pregabalin; Pseudoephedrine; Terconazole; Tramadol; Trazodone; Venlafaxine; Venom-honey bee; Vistaril [hydroxyzine hcl]; and Metronidazole  Family History   Problem Relation Age of Onset     No Medical Problems Mother      Heart disease Father      Sleep apnea Sister      Snoring Sister      Heart disease Sister      No Medical Problems Daughter      No Medical Problems Maternal Grandmother      No Medical Problems Maternal Grandfather      No Medical Problems Paternal Grandmother      No Medical Problems Paternal Grandfather      No Medical Problems Maternal Aunt      No Medical Problems Paternal Aunt      BRCA 1/2 Neg Hx      Breast cancer Neg Hx      Cancer Neg Hx      Colon cancer Neg Hx      Endometrial cancer Neg Hx      Ovarian cancer Neg Hx      Social History     Socioeconomic History     Marital status:      Spouse name: Not on file     Number of children: Not on file     Years of education: Not on file     Highest education level: Not on file   Occupational History     Not on file   Social Needs     Financial resource strain: Not on file     Food insecurity:     Worry: Not on file     Inability: Not on file     Transportation needs:     Medical: Not on file     Non-medical: Not on file   Tobacco Use     Smoking status: Former Smoker     Smokeless tobacco: Never Used     Tobacco comment: only in college   Substance and Sexual Activity     Alcohol use: Yes     Comment: seldom     Drug use: No     Sexual activity: Not Currently   Lifestyle     Physical activity:     Days per week: Not on file     Minutes per session: Not on file     Stress: Not on file   Relationships     Social connections:     Talks on phone: Not on file     Gets together: Not on file      "Attends Worship service: Not on file     Active member of club or organization: Not on file     Attends meetings of clubs or organizations: Not on file     Relationship status: Not on file     Intimate partner violence:     Fear of current or ex partner: Not on file     Emotionally abused: Not on file     Physically abused: Not on file     Forced sexual activity: Not on file   Other Topics Concern     Not on file   Social History Narrative     Not on file         Review of systems is as stated in HPI, and the remainder of the 10 system review is otherwise negative.    Objective:     Vitals:    05/20/19 0914   BP: 122/62   Patient Site: Left Arm   Patient Position: Sitting   Cuff Size: Adult Regular   Pulse: 80   Weight: 129 lb 14.4 oz (58.9 kg)   Height: 5' 2\" (1.575 m)    Body mass index is 23.76 kg/m .    General appearance: alert, appears stated age and cooperative  Head: Normocephalic, without obvious abnormality, atraumatic    GHAZALA-7: 10    ACT: 25      This note has been dictated using voice recognition software. Any grammatical or context distortions are unintentional and inherent to the the software.       "

## 2021-05-29 ENCOUNTER — RECORDS - HEALTHEAST (OUTPATIENT)
Dept: ADMINISTRATIVE | Facility: CLINIC | Age: 76
End: 2021-05-29

## 2021-05-29 NOTE — PROGRESS NOTES
S: Patient was seen today at the [facility] in [place] with a prescription from Dr. Haney for bilateral custom FOs. According to the patient, she is experiencing pain and discomfort at the heels (RT>LT) for the past 6-8 months. Pain is described as a constant ache that increases in severity when pressure is applied. Patient states that she wore a pair of off the shelf FOs for a couple of months which did little to reduce the pain symptoms. Patient states that she received an injection last week from Dr. Haney that did not help with the pain. Surgical history includes LT bunion removal 20 years ago.     O: Patient is 5'2'', weighs 130 lbs, and current shoe size is a woman's 6.5/7. Patient presents with a RT bunion (semi rigid), bilateral flexible forefoot, LT 2 degrees of valgus hindfoot alignment (reducible) with moderate medial longitudinal arch collapse while weight bearing, and RT 2 degrees of varus hindfoot alignment (reducible) with minimal medial longitudinal arch collapse while weight bearing. Patient is favoring the RT side causing the varus hindfoot position.  Patient was alert/oriented during the appointment and not in distress. Patient ambulates with antalgic gait and without the use of ambulatory aids.    A: Impressions were taken with foam blocks as the patient was semi weight bearing. Valgus/varus hindfoot alignment was reduced during the molding procedure. FOs will be fabricated by [employer] with top layer Spenco, mid layer matte poron and base layer cork (MTPJs length). Cushion heels will be incorporated into the new FOs. Custom FOs are required due to patient's foot structure not being accommodated by an OTS FO, the need to offload the heels, and degree of correction needed to reduce valgus/varus hindfoot alignment.     G: Custom FOs will treat patient's current condition by providing support to the transverse/medial longitudinal arch and reducing valgus/varus hindfoot alignment  which will lead to joint stability, reduction in pain, and increase patient's ADLs.    P: Patient will return to the [facility] for fitting.    This note was electronically signed by Hernesto FABIAN , ABC #[id number], License #[id number]

## 2021-05-29 NOTE — PATIENT INSTRUCTIONS - HE
Please call one of the [place] locations below to schedule an appointment. If you received a prescription please bring it with you to your appointment. Some locations are limited to what they carry.    Office Locations    [facility]  [address] [place] [address]  Home Medical Equipment, [address]   Phone: [phone]   Orthotics and Prosthetics, [address]   Phone: [phone]    [facility]  Home Medical Equipment  [address], [address], [place] [address]   Phone: [phone]    Orthotics and Prosthetics ([facility])    [address], [address], [place] [address]  Phone: [phone]    [facility]  [address]  [address]   [place] [address]   Phone: [phone]    [facility]ld[facility]  [address]  [place] [address]  Phone: [phone]    [facility] Bldg[facility]   [address]  [place] [address]  Phone: [phone]    [facility]  [address]  [place] [address]  Phone: [phone]    [facility]  [address]  [address]  [place] [address]  Phone: [phone]    [place]   [address] [place] [address]   Phone: [phone]

## 2021-05-29 NOTE — PROGRESS NOTES
FOOT AND ANKLE SURGERY/PODIATRY CONSULT NOTE         ASSESSMENT:   Planta fasciitis right foot      TREATMENT:  The patient was given a cortisone injection in the right heel today.  I have also recommended orthotics.  The patient is to take OTC anti-inflammatory medication as needed.        HPI: I was asked to see Nicole Marcial today to evaluate and treat heel pain right foot.  The patient stated she has had this heel pain for several months.  The pain is quite severe and she has difficulty with weightbearing and ambulation.  She stated she can no longer participate in any of her favorite activities.  She likes to walk the dog and she cannot do that without severe pain she likes exercising but she cannot participate because of her right heel pain the pain is more pronounced when she first gets out of bed in the mornings.  She has post static dyskinesia.  She denies any particular trauma to the right heel.  The pain is nonradiating.  She has not had any associated redness but she has had some mild swelling.  She denies any other previous treatment.  She has tried night splints and over-the-counter shoe inserts.  This has not given her any relief.  The patient was seen in consultation at the request of Amirah Moran MD for evaluation treatment of right heel pain.     Past Medical History:   Diagnosis Date     Back pain      Chronic pain      Eczema      Fibromyalgia      Hypertension      Mild asthma      Nerve pain      Osteoporosis        Past Surgical History:   Procedure Laterality Date     BACK SURGERY      x3     BUNIONECTOMY       parathyroidectomy N/A 08/16/2016    Dr. Godoy     SHOULDER SURGERY       SINUS SURGERY       TONSILLECTOMY       TRIGGER FINGER RELEASE         Allergies   Allergen Reactions     Adhesive      Contact allergy     Aspirin (Tartrazine Only)      Abdominal pain       Cefuroxime Axetil      Rash       Cephalosporins      Rash--ceftin     Chloral Hydrate Analogues      Contact allergy      Chlorhexidine Gluconate      Contact allergy     Clindamycin      C diff     Codeine      Dairy      Doxepin      hyper     Ergot Alkaloids      Erythromycin Base      Stomach pain     Gluten      Celiac panel not positive, patient states has abdominal pain when she eats gluten     Hydroxyzine      Latex      Morphine      Not effective     Nickel      Nortriptyline      hyper     Pregabalin      Pseudoephedrine      Terconazole      Tramadol      Trazodone Nausea And Vomiting     Venlafaxine      Venom-Honey Bee      Vistaril [Hydroxyzine Hcl]      Metronidazole Rash     Rash and neuropathy         Current Outpatient Medications:      acyclovir (ZOVIRAX) 5 % ointment, Apply up to 5 times daily topically, Disp: 15 g, Rfl: 1     albuterol (PROAIR HFA;PROVENTIL HFA;VENTOLIN HFA) 90 mcg/actuation inhaler, Inhale 2 puffs every 6 (six) hours as needed for wheezing or shortness of breath., Disp: 1 Inhaler, Rfl: 5     amitriptyline (ELAVIL) 25 MG tablet, Take 25 mg by mouth., Disp: , Rfl: 5     amLODIPine (NORVASC) 5 MG tablet, TAKE ONE TABLET BY MOUTH EVERY DAY, Disp: 90 tablet, Rfl: 3     busPIRone (BUSPAR) 15 MG tablet, Take 1.5 tablets (22.5 mg total) by mouth 2 (two) times a day., Disp: 90 tablet, Rfl: 3     calcium, as carbonate, (TUMS) 200 mg calcium (500 mg) chewable tablet, Chew., Disp: , Rfl:      cholecalciferol, vitamin D3, 1,000 unit tablet, Take 2,000 Units by mouth daily. , Disp: , Rfl:      clonazePAM (KLONOPIN) 0.5 MG tablet, Take 1 tablet (0.5 mg total) by mouth 2 (two) times a day as needed for anxiety., Disp: 60 tablet, Rfl: 0     ESTRACE 0.01 % (0.1 mg/gram) vaginal cream, , Disp: , Rfl: 3     estradiol (ESTRACE) 0.5 MG tablet, Take 0.5 mg by mouth., Disp: , Rfl:      estradiol-norethindrone (ACTIVELLA) 1-0.5 mg per tablet, Take 1 tablet by mouth daily., Disp: 90 tablet, Rfl: 3     loratadine (CLARITIN) 10 mg tablet, Take 10 mg by mouth daily., Disp: , Rfl:      loratadine (CLARITIN) 10 mg tablet,  , Disp: , Rfl:      ranitidine (ZANTAC) 300 MG tablet, Take 1/2 tablet by mouth at HS, Disp: 180 tablet, Rfl: 3    Current Facility-Administered Medications:      dexamethasone injection 8 mg (DECADRON), 8 mg, Intramuscular, Once, Alfonzo Haney DPM    Family History   Problem Relation Age of Onset     No Medical Problems Mother      Heart disease Father      Sleep apnea Sister      Snoring Sister      Heart disease Sister      No Medical Problems Daughter      No Medical Problems Maternal Grandmother      No Medical Problems Maternal Grandfather      No Medical Problems Paternal Grandmother      No Medical Problems Paternal Grandfather      No Medical Problems Maternal Aunt      No Medical Problems Paternal Aunt      BRCA 1/2 Neg Hx      Breast cancer Neg Hx      Cancer Neg Hx      Colon cancer Neg Hx      Endometrial cancer Neg Hx      Ovarian cancer Neg Hx        Social History     Socioeconomic History     Marital status:      Spouse name: Not on file     Number of children: Not on file     Years of education: Not on file     Highest education level: Not on file   Occupational History     Not on file   Social Needs     Financial resource strain: Not on file     Food insecurity:     Worry: Not on file     Inability: Not on file     Transportation needs:     Medical: Not on file     Non-medical: Not on file   Tobacco Use     Smoking status: Never Smoker     Smokeless tobacco: Never Used   Substance and Sexual Activity     Alcohol use: Yes     Frequency: 2-4 times a month     Drinks per session: 1 or 2     Binge frequency: Monthly     Comment: seldom     Drug use: Yes     Types: Marijuana     Sexual activity: Not Currently   Lifestyle     Physical activity:     Days per week: Not on file     Minutes per session: Not on file     Stress: Not on file   Relationships     Social connections:     Talks on phone: Not on file     Gets together: Not on file     Attends Islam service: Not on file     Active  member of club or organization: Not on file     Attends meetings of clubs or organizations: Not on file     Relationship status: Not on file     Intimate partner violence:     Fear of current or ex partner: Not on file     Emotionally abused: Not on file     Physically abused: Not on file     Forced sexual activity: Not on file   Other Topics Concern     Not on file   Social History Narrative     Not on file       Review of Systems - Patient denies fever, chills, rash, wound, stiffness, limping, numbness, weakness, heart burn, blood in stool, chest pain with activity, calf pain when walking, shortness of breath with activity, chronic cough, easy bleeding/bruising, swelling of ankles, excessive thirst, fatigue, depression, anxiety.  Patient admits to right heel pain.    OBJECTIVE:  Appearance: alert, well appearing, and in no distress.    Vitals:    06/12/19 1324   BP: 131/78   Pulse: 89   Resp: 20   Temp: 97.7  F (36.5  C)       BMI= Body mass index is 23.59 kg/m .    General appearance: Patient is alert and fully cooperative with history & exam.  No sign of distress is noted during the visit.  Psychiatric: Affect is pleasant & appropriate.  Patient appears motivated to improve health.  Respiratory: Breathing is regular & unlabored while sitting.  HEENT: Hearing is intact to spoken word.  Speech is clear.  No gross evidence of visual impairment that would impact ambulation.    Vascular: Dorsalis pedis and posterior tibial pulses are palpable. There is no pedal hair growth bilaterally.  CFT < 3 sec from anterior tibial surface to distal digits bilaterally. There is no appreciable edema noted.  Dermatologic: Turgor and texture are within normal limits. No coloration or temperature changes. No primary or secondary lesions noted.  Neurologic: All epicritic and proprioceptive sensations are grossly intact bilaterally.  Musculoskeletal: All active and passive ankle, subtalar, midtarsal, and 1st MPJ range of motion are  grossly intact without pain or crepitus, with the exception of none. Manual muscle strength is within normal limits bilaterally. All dorsiflexors, plantarflexors, invertors, evertors are intact bilaterally. Tenderness present to plantar medial aspect right heel on palpation.  No tenderness to right foot or ankle with range of motion. Calf is soft/non-tender without warmth/induration    Imaging:     No results found.       TREATMENT:  The patient was given a cortisone injection in the right heel today consisting of 2 cc of dexamethasone sodium phosphate and 1 cc of 2% lidocaine plain.  I have also recommended orthotics.  The patient is to return to the clinic in 1 week if her pain persist at which time I will give her a second and final cortisone injection.    Alfonzo Haney; BOOKER  [facility] Foot & Ankle Surgery/Podiatry

## 2021-05-30 ENCOUNTER — RECORDS - HEALTHEAST (OUTPATIENT)
Dept: ADMINISTRATIVE | Facility: CLINIC | Age: 76
End: 2021-05-30

## 2021-05-30 VITALS — BODY MASS INDEX: 23.83 KG/M2 | WEIGHT: 130.31 LBS

## 2021-05-30 NOTE — TELEPHONE ENCOUNTER
Per review of chart- pt had a cortisone injection with Dr. Haney in right heel on 7/10/2019.        7/10/2019: OV note:   TREATMENT:  The patient was given a second and final cortisone injection in the right heel today consisting of 2 cc of dexamethasone sodium phosphate and 1 cc of 2% lidocaine plain.  I have asked the patient to return to clinic if her pain persist at which time an x-ray of her right foot will be taken.  I told the patient she may require surgical intervention if these conservative measures fail to eliminate her symptoms.     6/12/2019: OV note  TREATMENT:  The patient was given a cortisone injection in the right heel today.  I have also recommended orthotics.  The patient is to take OTC anti-inflammatory medication as needed.

## 2021-05-30 NOTE — PROGRESS NOTES
FOOT AND ANKLE SURGERY/PODIATRY Progress Note        ASSESSMENT:   Planta fasciitis right foot    HPI: Nicole Marcial was seen again today with continued complaints of heel pain.  Patient was previously diagnosed with plantar fasciitis of her right foot.  The patient stated that the pain is quite severe.  She was treated with cortisone injections and orthotics.  She has not improved.  She is now developing some mild pain involving her left heel.  The pain is still aggravated with weightbearing and ambulation.  She continues to have post static dyskinesia.    Past Medical History:   Diagnosis Date     Back pain      Chronic pain      Eczema      Fibromyalgia      Hypertension      Mild asthma      Nerve pain      Osteoporosis        Past Surgical History:   Procedure Laterality Date     BACK SURGERY      x3     BUNIONECTOMY       parathyroidectomy N/A 08/16/2016    Dr. Godoy     SHOULDER SURGERY       SINUS SURGERY       TONSILLECTOMY       TRIGGER FINGER RELEASE         Allergies   Allergen Reactions     Adhesive      Contact allergy     Aspirin (Tartrazine Only)      Abdominal pain       Cefuroxime Axetil      Rash       Cephalosporins      Rash--ceftin     Chloral Hydrate Analogues      Contact allergy     Chlorhexidine Gluconate      Contact allergy     Clindamycin      C diff     Codeine      Dairy      Doxepin      hyper     Ergot Alkaloids      Erythromycin Base      Stomach pain     Gluten      Celiac panel not positive, patient states has abdominal pain when she eats gluten     Hydroxyzine      Latex      Morphine      Not effective     Nickel      Nortriptyline      hyper     Pregabalin      Pseudoephedrine      Terconazole      Tramadol      Trazodone Nausea And Vomiting     Venlafaxine      Venom-Honey Bee      Vistaril [Hydroxyzine Hcl]      Metronidazole Rash     Rash and neuropathy         Current Outpatient Medications:      acyclovir (ZOVIRAX) 5 % ointment, Apply up to 5 times daily topically,  Disp: 15 g, Rfl: 1     albuterol (PROAIR HFA;PROVENTIL HFA;VENTOLIN HFA) 90 mcg/actuation inhaler, Inhale 2 puffs every 6 (six) hours as needed for wheezing or shortness of breath., Disp: 1 Inhaler, Rfl: 5     amitriptyline (ELAVIL) 25 MG tablet, Take 25 mg by mouth., Disp: , Rfl: 5     amLODIPine (NORVASC) 5 MG tablet, TAKE ONE TABLET BY MOUTH EVERY DAY, Disp: 90 tablet, Rfl: 3     busPIRone (BUSPAR) 15 MG tablet, Take 1.5 tablets (22.5 mg total) by mouth 2 (two) times a day., Disp: 90 tablet, Rfl: 3     calcium, as carbonate, (TUMS) 200 mg calcium (500 mg) chewable tablet, Chew., Disp: , Rfl:      cholecalciferol, vitamin D3, 1,000 unit tablet, Take 2,000 Units by mouth daily. , Disp: , Rfl:      clonazePAM (KLONOPIN) 0.5 MG tablet, Take 1 tablet (0.5 mg total) by mouth 2 (two) times a day as needed for anxiety., Disp: 60 tablet, Rfl: 0     ESTRACE 0.01 % (0.1 mg/gram) vaginal cream, , Disp: , Rfl: 3     estradiol (ESTRACE) 0.5 MG tablet, Take 0.5 mg by mouth., Disp: , Rfl:      estradiol-norethindrone (ACTIVELLA) 1-0.5 mg per tablet, Take 1 tablet by mouth daily., Disp: 90 tablet, Rfl: 3     loratadine (CLARITIN) 10 mg tablet, Take 10 mg by mouth daily., Disp: , Rfl:      loratadine (CLARITIN) 10 mg tablet, , Disp: , Rfl:      ranitidine (ZANTAC) 300 MG tablet, Take 1/2 tablet by mouth at HS, Disp: 180 tablet, Rfl: 3    Current Facility-Administered Medications:      dexamethasone injection 8 mg (DECADRON), 8 mg, Intramuscular, Once, Alfonzo Haney DPM    Family History   Problem Relation Age of Onset     No Medical Problems Mother      Heart disease Father      Sleep apnea Sister      Snoring Sister      Heart disease Sister      No Medical Problems Daughter      No Medical Problems Maternal Grandmother      No Medical Problems Maternal Grandfather      No Medical Problems Paternal Grandmother      No Medical Problems Paternal Grandfather      No Medical Problems Maternal Aunt      No Medical Problems  Paternal Aunt      BRCA 1/2 Neg Hx      Breast cancer Neg Hx      Cancer Neg Hx      Colon cancer Neg Hx      Endometrial cancer Neg Hx      Ovarian cancer Neg Hx        Social History     Socioeconomic History     Marital status:      Spouse name: Not on file     Number of children: Not on file     Years of education: Not on file     Highest education level: Not on file   Occupational History     Not on file   Social Needs     Financial resource strain: Not on file     Food insecurity:     Worry: Not on file     Inability: Not on file     Transportation needs:     Medical: Not on file     Non-medical: Not on file   Tobacco Use     Smoking status: Never Smoker     Smokeless tobacco: Never Used   Substance and Sexual Activity     Alcohol use: Yes     Frequency: 2-4 times a month     Drinks per session: 1 or 2     Binge frequency: Monthly     Comment: seldom     Drug use: Yes     Types: Marijuana     Sexual activity: Not Currently   Lifestyle     Physical activity:     Days per week: Not on file     Minutes per session: Not on file     Stress: Not on file   Relationships     Social connections:     Talks on phone: Not on file     Gets together: Not on file     Attends Hinduism service: Not on file     Active member of club or organization: Not on file     Attends meetings of clubs or organizations: Not on file     Relationship status: Not on file     Intimate partner violence:     Fear of current or ex partner: Not on file     Emotionally abused: Not on file     Physically abused: Not on file     Forced sexual activity: Not on file   Other Topics Concern     Not on file   Social History Narrative     Not on file       10 point Review of Systems is negative       Vitals:    07/10/19 1547   BP: 126/86   SpO2: 98%       BMI= Body mass index is 23.59 kg/m .    OBJECTIVE:  General appearance: Patient is alert and fully cooperative with history & exam.  No sign of distress is noted during the visit.  Vascular:  Dorsalis pedis and posterior tibial pulses are palpable. There is no pedal hair growth bilaterally.  CFT < 3 sec from anterior tibial surface to distal digits bilaterally. There is no appreciable edema noted.  Dermatologic: Turgor and texture are within normal limits. No coloration or temperature changes. No primary or secondary lesions noted.  Neurologic: All epicritic and proprioceptive sensations are grossly intact bilaterally.  Musculoskeletal: All active and passive ankle, subtalar, midtarsal, and 1st MPJ range of motion are grossly intact without pain or crepitus, with the exception of none. Manual muscle strength is within normal limits bilaterally. All dorsiflexors, plantarflexors, invertors, evertors are intact bilaterally. Tenderness present to the medial aspect right heel on palpation.  No tenderness to right foot or ankle with range of motion.   Imaging:     No results found.         TREATMENT:  The patient was given a second and final cortisone injection in the right heel today consisting of 2 cc of dexamethasone sodium phosphate and 1 cc of 2% lidocaine plain.  I have asked the patient to return to clinic if her pain persist at which time an x-ray of her right foot will be taken.  I told the patient she may require surgical intervention if these conservative measures fail to eliminate her symptoms.        Alfonzo Haney; BOOKER  [facility] Foot & Ankle Surgery/Podiatry

## 2021-05-30 NOTE — PROGRESS NOTES
Assessment/Plan:     1. Right wrist pain  XR Wrist Right 3 or More VWS    C-Reactive Protein(CRP)    Sedimentation Rate    Antinuclear Antibody (GAURAV) Cascade   2. Need for hepatitis C screening test  Hepatitis C Antibody (Anti-HCV)   3. Anxiety  clonazePAM (KLONOPIN) 0.5 MG tablet    DISCONTINUED: clonazePAM (KLONOPIN) 0.5 MG tablet   4. Benign essential hypertension  Comprehensive Metabolic Panel    Lipid Cascade RANDOM   5. Bilateral swelling of feet     6. GERD (gastroesophageal reflux disease)  ranitidine (ZANTAC) 300 MG tablet   7. Plantar fasciitis         Diagnoses and all orders for this visit:    Right wrist pain  -     XR Wrist Right 3 or More VWS; Future; Expected date: 07/29/2019  -     C-Reactive Protein(CRP)  -     Sedimentation Rate  -     Antinuclear Antibody (GAURAV) Cascade  -Discussed x-ray findings with patient.  With no significant findings on x-ray.  We will check inflammatory markers and GAURAV cascade.  Symptoms more likely due to tendinitis.  She reports that she has been doing a lot more reading and holding a book since she has not been able to be as active due to plantar fasciitis.  Discussed trying to hold her book in a different way.  Discussed trial of wrist brace and ice.  If symptoms not improving, recommend follow-up with orthopedic hand specialist    Need for hepatitis C screening test  -     Hepatitis C Antibody (Anti-HCV)    Anxiety  -     clonazePAM (KLONOPIN) 0.5 MG tablet; Take 1 tablet (0.5 mg total) by mouth 2 (two) times a day as needed for anxiety.  Dispense: 60 tablet; Refill: 0  -She continues buspirone.  Refill provided on clonazepam to use as needed.  She was counseled on use of medication and side effects      Benign essential hypertension  -     Comprehensive Metabolic Panel  -     Lipid Cascade RANDOM  -Blood pressure controlled with current medication.  This will be continued    Bilateral swelling of feet  Swelling is only in the heel area and most likely secondary to  plantar fasciitis  -  GERD (gastroesophageal reflux disease)  -     ranitidine (ZANTAC) 300 MG tablet; Take 1 tablet by mouth twice daily  Dispense: 180 tablet; Refill: 3   -refill provided on Zantac    Plantar fasciitis  She will follow-up with podiatrist         Subjective:      Nicole Marcial is a 74 y.o. female who comes in today complaining of right wrist pain and swelling as well as swelling in her heels.  She has been struggling with plantar fasciitis and she has been following with podiatry.  She recently received a steroid injections for treatment of plantar fasciitis.  She was advised to stay off of her feet and rest as much as possible.  Consideration for surgery will be made if symptoms do not improve.  Because of this she has been doing a lot more reading.  She has noticed some pain and some mild swelling in the outer part of her right wrist.  She is also noticed some swelling around her heels.  She has not tried icing.  She has not tried wearing a wrist brace.  No numbness or tingling.  No redness or warmth.  We reviewed and updated her medications and allergies.  She would like refill of clonazepam which she uses as needed.  She also would like refill of ranitidine.  No other concerns or questions today.    Current Outpatient Medications   Medication Sig Dispense Refill     amitriptyline (ELAVIL) 25 MG tablet Take 25 mg by mouth.  5     amLODIPine (NORVASC) 5 MG tablet TAKE ONE TABLET BY MOUTH EVERY DAY 90 tablet 3     busPIRone (BUSPAR) 15 MG tablet Take 1.5 tablets (22.5 mg total) by mouth 2 (two) times a day. 90 tablet 3     calcium, as carbonate, (TUMS) 200 mg calcium (500 mg) chewable tablet Chew.       cholecalciferol, vitamin D3, 1,000 unit tablet Take 2,000 Units by mouth daily.        clonazePAM (KLONOPIN) 0.5 MG tablet Take 1 tablet (0.5 mg total) by mouth 2 (two) times a day as needed for anxiety. 60 tablet 0     estradiol (ESTRACE) 0.5 MG tablet Take 0.5 mg by mouth.        estradiol-norethindrone (ACTIVELLA) 1-0.5 mg per tablet Take 1 tablet by mouth daily. 90 tablet 3     loratadine (CLARITIN) 10 mg tablet Take 10 mg by mouth daily.       ranitidine (ZANTAC) 300 MG tablet Take 1 tablet by mouth twice daily 180 tablet 3     acyclovir (ZOVIRAX) 5 % ointment Apply up to 5 times daily topically 15 g 1     albuterol (PROAIR HFA;PROVENTIL HFA;VENTOLIN HFA) 90 mcg/actuation inhaler Inhale 2 puffs every 6 (six) hours as needed for wheezing or shortness of breath. 1 Inhaler 5     ESTRACE 0.01 % (0.1 mg/gram) vaginal cream   3     No current facility-administered medications for this visit.        Past Medical History, Family History, and Social History reviewed.  Past Medical History:   Diagnosis Date     Back pain      Chronic pain      Eczema      Fibromyalgia      Hypertension      Mild asthma      Nerve pain      Osteoporosis      Past Surgical History:   Procedure Laterality Date     BACK SURGERY      x3     BUNIONECTOMY       parathyroidectomy N/A 08/16/2016    Dr. Godoy     SHOULDER SURGERY       SINUS SURGERY       TONSILLECTOMY       TRIGGER FINGER RELEASE       Adhesive; Aspirin (tartrazine only); Cefuroxime axetil; Cephalosporins; Chloral hydrate analogues; Chlorhexidine gluconate; Clindamycin; Codeine; Dairy; Doxepin; Ergot alkaloids; Erythromycin base; Gluten; Hydroxyzine; Latex; Morphine; Nickel; Nortriptyline; Pregabalin; Pseudoephedrine; Terconazole; Tramadol; Trazodone; Venlafaxine; Venom-honey bee; Vistaril [hydroxyzine hcl]; and Metronidazole  Family History   Problem Relation Age of Onset     No Medical Problems Mother      Heart disease Father      Sleep apnea Sister      Snoring Sister      Heart disease Sister      No Medical Problems Daughter      No Medical Problems Maternal Grandmother      No Medical Problems Maternal Grandfather      No Medical Problems Paternal Grandmother      No Medical Problems Paternal Grandfather      No Medical Problems Maternal Aunt       No Medical Problems Paternal Aunt      BRCA 1/2 Neg Hx      Breast cancer Neg Hx      Cancer Neg Hx      Colon cancer Neg Hx      Endometrial cancer Neg Hx      Ovarian cancer Neg Hx      Social History     Socioeconomic History     Marital status:      Spouse name: Not on file     Number of children: Not on file     Years of education: Not on file     Highest education level: Not on file   Occupational History     Not on file   Social Needs     Financial resource strain: Not on file     Food insecurity:     Worry: Not on file     Inability: Not on file     Transportation needs:     Medical: Not on file     Non-medical: Not on file   Tobacco Use     Smoking status: Never Smoker     Smokeless tobacco: Never Used   Substance and Sexual Activity     Alcohol use: Yes     Frequency: 2-4 times a month     Drinks per session: 1 or 2     Binge frequency: Monthly     Comment: seldom     Drug use: Yes     Types: Marijuana     Sexual activity: Not Currently   Lifestyle     Physical activity:     Days per week: Not on file     Minutes per session: Not on file     Stress: Not on file   Relationships     Social connections:     Talks on phone: Not on file     Gets together: Not on file     Attends Orthodox service: Not on file     Active member of club or organization: Not on file     Attends meetings of clubs or organizations: Not on file     Relationship status: Not on file     Intimate partner violence:     Fear of current or ex partner: Not on file     Emotionally abused: Not on file     Physically abused: Not on file     Forced sexual activity: Not on file   Other Topics Concern     Not on file   Social History Narrative     Not on file         Review of systems is as stated in HPI, and the remainder of the 10 system review is otherwise negative.    Objective:     Vitals:    07/29/19 1331 07/29/19 1431   BP: 144/72 136/70   Patient Site: Left Arm Left Arm   Patient Position: Sitting Sitting   Cuff Size: Adult Regular  "Adult Regular   Pulse: (!) 115    Temp: 98  F (36.7  C)    TempSrc: Oral    SpO2: 95%    Weight: 136 lb 8 oz (61.9 kg)    Height: 5' 2\" (1.575 m)     Body mass index is 24.97 kg/m .    General appearance: alert, appears stated age and cooperative  Extremities: Mild soft tissue swelling present ulnar aspect of right wrist.  Tenderness with palpation around ulnar styloid process and extending up along ulnar aspect of forearm and medial aspect of wrist, soft tissue swelling present medial aspect of heels  Neurologic: Grossly normal    : X-ray of right wrist was performed in clinic and personally reviewed.  This showed mild arthritic changes.  It was otherwise negative      This note has been dictated using voice recognition software. Any grammatical or context distortions are unintentional and inherent to the the software.       "

## 2021-05-30 NOTE — TELEPHONE ENCOUNTER
Call from pharmacy     Pharmacy needs ok to administer  Shingrix vaccine - pt has had recent cortisone shot         Tele# for pharmacy  [phone]      [facility]     [address] [place] [address]       I will message provider and have them follow   up      Shant Hernandez, RN   Triage and Medication Refills

## 2021-05-30 NOTE — PROGRESS NOTES
S: Patient was seen today at the [facility] in [place]  for fitting of bilateral custom FOs with cushion heels fabricated by [employer].     O: Patient's status has not changed since last visit.    A: Custom FOs were trimmed to accommodate contours and length of patient's current shoes. Patient was able to ambulate with the FOs on during the appointment without issue.     Outcome Measure: FOs were found to be comfortable for the patient to wear (per patient feedback), valgus/varus hindfoot alignment was reduce by the FOs while weightbearing, and there were no visible signs of excessive pressure.   Patient is satisfied with the fit and function of the FOs. Patient was given verbal instructions and [employer] instruction sheet on cleaning of the FOs, fitting issues, warranty issues, and who to contact if there is an issue.     P: Patient will go through a 5 day break in period which involves increasing the wear time in the FOs by 2 hours each day until on the 5th day the patient is wearing the FOs all day. Patient will follow up with the [facility] as needed.  This note was electronically signed by Hernesto FABIAN , ABC #[id number], License #[id number]

## 2021-05-30 NOTE — TELEPHONE ENCOUNTER
Called [facility] pharmacy and spoke with pharmacist. Relayed info from Dr. Moran asking that patient wait to receive Shingrix until our [facility] Pharmacist is able to review the chart and make a recommendation.   Per [facility], please include a script for 2 doses of Shingrix once appropriate, as patient advised to get this at [facility] due to cost.      Eliz Worley RN

## 2021-05-30 NOTE — TELEPHONE ENCOUNTER
When shingles was a live vaccine (Zostavax), this would be more an issue since the patient may be immunocompromised with a steroid injection, but Shingrix is not a live vaccine, so I dont see a problem with administering the vaccination.     Per CDC, contraindications to herpes zoster:   Mild acute illness with or without fever  Lack of previous physical examination in well-appearing person  Current antimicrobial therapy(a)  Convalescent phase of illness   birth (hepatitis B vaccine is an exception in certain circumstances)(b)  Recent exposure to an infectious disease  History of penicillin allergy, other nonvaccine allergies, relatives with allergies, or receiving allergen extract immunotherapy  History of GBS(c)    Grace CaldwellD., BCACP  Medication Therapy Management Pharmacist  [facility]

## 2021-05-30 NOTE — PATIENT INSTRUCTIONS - HE
Try some ice, wrist brace and modification of how you hold a book. If wrist pain does not improve, see hand specialist

## 2021-05-30 NOTE — TELEPHONE ENCOUNTER
Our pharmacist is not available today. I would prefer that she wait a little longer before having the shingles vaccine.  I will also send a note to our pharmacist to see what the optimal wait time would be.     Nemo, please let me know if you have any recommendations as to how long after a cortisone injection pt should wait before receiving the shingles vaccine. Thanks.

## 2021-05-31 ENCOUNTER — RECORDS - HEALTHEAST (OUTPATIENT)
Dept: ADMINISTRATIVE | Facility: CLINIC | Age: 76
End: 2021-05-31

## 2021-05-31 VITALS — HEIGHT: 62 IN | BODY MASS INDEX: 24.86 KG/M2 | WEIGHT: 135.1 LBS

## 2021-05-31 VITALS — WEIGHT: 131 LBS | BODY MASS INDEX: 24.75 KG/M2

## 2021-05-31 VITALS — BODY MASS INDEX: 25.16 KG/M2 | WEIGHT: 133.25 LBS | HEIGHT: 61 IN

## 2021-05-31 VITALS — WEIGHT: 133.44 LBS | BODY MASS INDEX: 24.41 KG/M2

## 2021-05-31 VITALS — BODY MASS INDEX: 24.51 KG/M2 | WEIGHT: 134 LBS

## 2021-05-31 VITALS — BODY MASS INDEX: 24.79 KG/M2 | WEIGHT: 135.56 LBS

## 2021-06-01 ENCOUNTER — RECORDS - HEALTHEAST (OUTPATIENT)
Dept: ADMINISTRATIVE | Facility: CLINIC | Age: 76
End: 2021-06-01

## 2021-06-01 VITALS — WEIGHT: 131.4 LBS | HEIGHT: 62 IN | BODY MASS INDEX: 24.18 KG/M2

## 2021-06-01 VITALS — BODY MASS INDEX: 23.81 KG/M2 | WEIGHT: 130.19 LBS

## 2021-06-01 VITALS — WEIGHT: 130.6 LBS | BODY MASS INDEX: 24.03 KG/M2 | HEIGHT: 62 IN

## 2021-06-02 ENCOUNTER — RECORDS - HEALTHEAST (OUTPATIENT)
Dept: ADMINISTRATIVE | Facility: CLINIC | Age: 76
End: 2021-06-02

## 2021-06-02 VITALS — WEIGHT: 133 LBS | BODY MASS INDEX: 24.33 KG/M2

## 2021-06-02 VITALS — BODY MASS INDEX: 23.67 KG/M2 | WEIGHT: 129.44 LBS

## 2021-06-02 VITALS — BODY MASS INDEX: 23.78 KG/M2 | WEIGHT: 130 LBS

## 2021-06-02 VITALS — WEIGHT: 130 LBS | BODY MASS INDEX: 23.78 KG/M2

## 2021-06-02 NOTE — PROGRESS NOTES
Assessment/Plan:     1. Pain in both wrists  Ambulatory referral to Orthopedic Surgery   2. Mild intermittent asthma without complication  albuterol (PROAIR HFA;PROVENTIL HFA;VENTOLIN HFA) 90 mcg/actuation inhaler   3. Primary insomnia  amitriptyline (ELAVIL) 25 MG tablet       Diagnoses and all orders for this visit:    Pain in both wrists  -     Ambulatory referral to Orthopedic Surgery  -She does appear to have small lipomas in the bilateral wrist area near source of her discomfort.  Discussed I am not sure this is the sole cause of her discomfort as I suspect she still has a little bit of a tendinitis as well.  Discussed she could see a orthopedic hand specialist for further evaluation.  Referral was placed.    Mild intermittent asthma without complication  -     albuterol (PROAIR HFA;PROVENTIL HFA;VENTOLIN HFA) 90 mcg/actuation inhaler; Inhale 2 puffs every 6 (six) hours as needed for wheezing or shortness of breath.  Dispense: 1 Inhaler; Refill: 5  -She will continue Claritin    Primary insomnia  -     amitriptyline (ELAVIL) 25 MG tablet; Take 1 tablet (25 mg total) by mouth at bedtime.  Dispense: 90 tablet; Refill: 3  -Refill provided on amitriptyline           Subjective:      Nicole Marcial is a 74 y.o. female who comes in today to follow-up on wrist pain as well as for a couple of other concerns.  She will be traveling down to [place] for the winter and plans to leave in about 2 weeks.  She will return in the spring.  She was seen in clinic a couple months ago due to concern about right wrist pain.  X-ray was negative and inflammatory markers were normal.  She was felt to have a tendinitis and she was advised on conservative measures including rest and ice.  Symptoms in the right wrist have improved but she still experiences some pain along the radial aspect of the wrist.  She is now having a similar type of pain in the left wrist.  She has gained some weight and she has noticed that there is a  little bit of fatty deposits in the areas of discomfort.  She reports that her sister has a history of fatty cysts and has had these removed.  She is wondering if this could be the source of her discomfort.  She is requesting a refill of amitriptyline.  This was prescribed by her gastroenterologist to help with headaches.  She also finds that it helps her fall asleep at night.  She would like a refill to come from primary care physician rather than returning to gastroenterology for the refill.  She reports that her asthma has been acting up a little bit with the change in weather.  She has been taking Claritin twice daily and using her albuterol inhaler and this has been working to keep her symptoms under control.  She has no other concerns or questions.  She received influenza vaccine at [facility].  Review of systems otherwise negative.    Current Outpatient Medications   Medication Sig Dispense Refill     amLODIPine (NORVASC) 5 MG tablet TAKE ONE TABLET BY MOUTH EVERY DAY 90 tablet 3     busPIRone (BUSPAR) 15 MG tablet Take 1.5 tablets (22.5 mg total) by mouth 2 (two) times a day. 90 tablet 3     calcium, as carbonate, (TUMS) 200 mg calcium (500 mg) chewable tablet Chew.       cholecalciferol, vitamin D3, 1,000 unit tablet Take 2,000 Units by mouth daily.        ESTRACE 0.01 % (0.1 mg/gram) vaginal cream   3     estradiol (ESTRACE) 0.5 MG tablet Take 0.5 mg by mouth.       estradiol-norethindrone (ACTIVELLA) 1-0.5 mg per tablet Take 1 tablet by mouth daily. 90 tablet 3     loratadine (CLARITIN) 10 mg tablet Take 10 mg by mouth daily.       ranitidine (ZANTAC) 300 MG tablet Take 1 tablet by mouth twice daily 180 tablet 3     acyclovir (ZOVIRAX) 5 % ointment Apply up to 5 times daily topically 15 g 1     albuterol (PROAIR HFA;PROVENTIL HFA;VENTOLIN HFA) 90 mcg/actuation inhaler Inhale 2 puffs every 6 (six) hours as needed for wheezing or shortness of breath. 1 Inhaler 5     amitriptyline (ELAVIL) 25 MG tablet Take 1  tablet (25 mg total) by mouth at bedtime. 90 tablet 3     clonazePAM (KLONOPIN) 0.5 MG tablet Take 1 tablet (0.5 mg total) by mouth 2 (two) times a day as needed for anxiety. 60 tablet 0     No current facility-administered medications for this visit.        Past Medical History, Family History, and Social History reviewed.  Past Medical History:   Diagnosis Date     Back pain      Chronic pain      Eczema      Fibromyalgia      Hypertension      Mild asthma      Nerve pain      Osteoporosis      Past Surgical History:   Procedure Laterality Date     BACK SURGERY      x3     BUNIONECTOMY       parathyroidectomy N/A 08/16/2016    Dr. Godoy     SHOULDER SURGERY       SINUS SURGERY       TONSILLECTOMY       TRIGGER FINGER RELEASE       Adhesive; Aspirin (tartrazine only); Cefuroxime axetil; Cephalosporins; Chloral hydrate analogues; Chlorhexidine gluconate; Clindamycin; Codeine; Dairy; Doxepin; Ergot alkaloids; Erythromycin base; Gluten; Hydroxyzine; Latex; Morphine; Nickel; Nortriptyline; Pregabalin; Pseudoephedrine; Terconazole; Tramadol; Trazodone; Venlafaxine; Venom-honey bee; Vistaril [hydroxyzine hcl]; and Metronidazole  Family History   Problem Relation Age of Onset     No Medical Problems Mother      Heart disease Father      Sleep apnea Sister      Snoring Sister      Heart disease Sister      No Medical Problems Daughter      No Medical Problems Maternal Grandmother      No Medical Problems Maternal Grandfather      No Medical Problems Paternal Grandmother      No Medical Problems Paternal Grandfather      No Medical Problems Maternal Aunt      No Medical Problems Paternal Aunt      BRCA 1/2 Neg Hx      Breast cancer Neg Hx      Cancer Neg Hx      Colon cancer Neg Hx      Endometrial cancer Neg Hx      Ovarian cancer Neg Hx      Social History     Socioeconomic History     Marital status:      Spouse name: Not on file     Number of children: Not on file     Years of education: Not on file     Highest  education level: Not on file   Occupational History     Not on file   Social Needs     Financial resource strain: Not on file     Food insecurity:     Worry: Not on file     Inability: Not on file     Transportation needs:     Medical: Not on file     Non-medical: Not on file   Tobacco Use     Smoking status: Never Smoker     Smokeless tobacco: Never Used   Substance and Sexual Activity     Alcohol use: Yes     Frequency: 2-4 times a month     Drinks per session: 1 or 2     Binge frequency: Monthly     Comment: seldom     Drug use: Yes     Types: Marijuana     Sexual activity: Not Currently   Lifestyle     Physical activity:     Days per week: Not on file     Minutes per session: Not on file     Stress: Not on file   Relationships     Social connections:     Talks on phone: Not on file     Gets together: Not on file     Attends Muslim service: Not on file     Active member of club or organization: Not on file     Attends meetings of clubs or organizations: Not on file     Relationship status: Not on file     Intimate partner violence:     Fear of current or ex partner: Not on file     Emotionally abused: Not on file     Physically abused: Not on file     Forced sexual activity: Not on file   Other Topics Concern     Not on file   Social History Narrative     Not on file         Review of systems is as stated in HPI, and the remainder of the 10 system review is otherwise negative.    Objective:     Vitals:    10/21/19 1024   BP: 119/72   Patient Site: Left Arm   Patient Position: Sitting   Cuff Size: Adult Large   Pulse: (!) 110   Temp: 98.2  F (36.8  C)   TempSrc: Oral   SpO2: 98%   Weight: 142 lb 4 oz (64.5 kg)    Body mass index is 26.02 kg/m .    General appearance: alert, appears stated age and cooperative  Head: Normocephalic, without obvious abnormality, atraumatic  Lungs: clear to auscultation bilaterally  Heart: regular rate and rhythm, S1, S2 normal, no murmur, click, rub or gallop  MSK: there is lipoma  present bilateral wrists near radial styloid process        This note has been dictated using voice recognition software. Any grammatical or context distortions are unintentional and inherent to the the software.

## 2021-06-03 VITALS — BODY MASS INDEX: 23.9 KG/M2 | HEIGHT: 62 IN | WEIGHT: 129.9 LBS

## 2021-06-03 VITALS — HEIGHT: 62 IN | BODY MASS INDEX: 23.74 KG/M2 | WEIGHT: 129 LBS

## 2021-06-03 VITALS — BODY MASS INDEX: 25.12 KG/M2 | HEIGHT: 62 IN | WEIGHT: 136.5 LBS

## 2021-06-03 VITALS
TEMPERATURE: 98.2 F | DIASTOLIC BLOOD PRESSURE: 72 MMHG | WEIGHT: 142.25 LBS | HEART RATE: 110 BPM | OXYGEN SATURATION: 98 % | BODY MASS INDEX: 26.02 KG/M2 | SYSTOLIC BLOOD PRESSURE: 119 MMHG

## 2021-06-03 VITALS — BODY MASS INDEX: 23.74 KG/M2 | HEIGHT: 62 IN | WEIGHT: 129 LBS

## 2021-06-04 VITALS
TEMPERATURE: 98.3 F | OXYGEN SATURATION: 95 % | BODY MASS INDEX: 27.06 KG/M2 | WEIGHT: 143.31 LBS | SYSTOLIC BLOOD PRESSURE: 122 MMHG | HEIGHT: 61 IN | DIASTOLIC BLOOD PRESSURE: 70 MMHG | HEART RATE: 102 BPM

## 2021-06-05 VITALS
SYSTOLIC BLOOD PRESSURE: 149 MMHG | DIASTOLIC BLOOD PRESSURE: 72 MMHG | TEMPERATURE: 98.6 F | BODY MASS INDEX: 27.86 KG/M2 | WEIGHT: 147.44 LBS | HEART RATE: 135 BPM | OXYGEN SATURATION: 96 %

## 2021-06-09 NOTE — PROGRESS NOTES
"Nicole Marcial is a 75 y.o. female who is being evaluated via a billable telephone visit.      The patient has been notified of following:     \"This telephone visit will be conducted via a call between you and your physician/provider. We have found that certain health care needs can be provided without the need for a physical exam.  This service lets us provide the care you need with a short phone conversation.  If a prescription is necessary we can send it directly to your pharmacy.  If lab work is needed we can place an order for that and you can then stop by our lab to have the test done at a later time.    Telephone visits are billed at different rates depending on your insurance coverage. During this emergency period, for some insurers they may be billed the same as an in-person visit.  Please reach out to your insurance provider with any questions.    If during the course of the call the physician/provider feels a telephone visit is not appropriate, you will not be charged for this service.\"    Patient has given verbal consent to a Telephone visit? Yes    What phone number would you like to be contacted at? [phone]    Patient would like to receive their AVS by AVS Preference: Terrell.        Assessment/Plan:     1. Mixed hyperlipidemia     2. Selective deficiency of IgG (H)     3. Hypertension         Diagnoses and all orders for this visit:    Mixed hyperlipidemia  We will place future order to check fasting lipids.  Encouraged regular exercise, healthy diet and weight loss efforts    Selective deficiency of IgG (H)  She is monitored on a yearly basis.  We will place order for immunoglobulin levels to be checked when she comes in for labs    Hypertension  Continue amlodipine.  Will check blood pressure at upcoming annual wellness visit.  Encouraged regular exercise, healthy diet and weight loss.  We will check comprehensive metabolic panel when she comes in for labs which will also show her kidney " function.  Discussed general measures to keep kidneys healthy including avoidance of NSAIDs and drinking plenty of fluids.             Subjective:      Nicole Marcial is a 75 y.o. female who is evaluated today via telephone encounter to discuss labs to evaluate kidney function.  She spends her hdz in [place].  Returned to [place] in May.  Usually will see a doctor in [place] in the spring time and have some blood work done.  For the last couple of years, when she has had her labs done in [place], it has shown that there has been a little bit of decreased kidney function.  However when she has had these labs repeated a few months later in [place] the kidney function has improved.  She did not see a doctor in [place] this spring due to COVID-19 so did not have her blood work done.  She is current about her kidney function.  Other she reports she is feeling well.  She has not had any significant health changes since she was last seen in clinic.  Did start taking famotidine in place of ranitidine but otherwise no medication changes.  She does occasionally use ibuprofen a couple of times a week for chronic pains.  States that she has gained about 15 pounds due to COVID-19.  She has been doing a lot more cooking and has had less physical activity.  She is still bothered with her plantar fasciitis.  She does try to get out and bike and does work in a garden.  She does have annual wellness visit scheduled in August.  No other concerns or questions.  Review of systems otherwise negative.    Current Outpatient Medications   Medication Sig Dispense Refill     amitriptyline (ELAVIL) 25 MG tablet Take 1 tablet (25 mg total) by mouth at bedtime. 90 tablet 3     amLODIPine (NORVASC) 5 MG tablet TAKE ONE TABLET BY MOUTH EVERY DAY 90 tablet 3     busPIRone (BUSPAR) 15 MG tablet TAKE 1 & 1/2 TABLETS BY MOUTH 2 TIMES DAILY. 270 tablet 3     calcium, as carbonate, (TUMS) 200 mg calcium (500 mg) chewable tablet Chew.        cholecalciferol, vitamin D3, 1,000 unit tablet Take 2,000 Units by mouth daily.        estradiol (ESTRACE) 0.5 MG tablet Take 0.5 mg by mouth.       estradiol-norethindrone (ACTIVELLA) 1-0.5 mg per tablet Take 1 tablet by mouth daily. 90 tablet 3     famotidine (FOR PEPCID) 10 MG tablet Take 20 mg by mouth 2 (two) times a day. Takes two at night , and one in the morning       loratadine (CLARITIN) 10 mg tablet Take 10 mg by mouth daily.       acyclovir (ZOVIRAX) 5 % ointment Apply up to 5 times daily topically 15 g 1     albuterol (PROAIR HFA;PROVENTIL HFA;VENTOLIN HFA) 90 mcg/actuation inhaler Inhale 2 puffs every 6 (six) hours as needed for wheezing or shortness of breath. 1 Inhaler 5     clonazePAM (KLONOPIN) 0.5 MG tablet Take 1 tablet (0.5 mg total) by mouth 2 (two) times a day as needed for anxiety. 60 tablet 0     ESTRACE 0.01 % (0.1 mg/gram) vaginal cream   3     No current facility-administered medications for this visit.        Past Medical History, Family History, and Social History reviewed.  Past Medical History:   Diagnosis Date     Back pain      Chronic pain      Eczema      Fibromyalgia      Hypertension      Mild asthma      Nerve pain      Osteoporosis      Past Surgical History:   Procedure Laterality Date     BACK SURGERY      x3     BUNIONECTOMY       parathyroidectomy N/A 08/16/2016    Dr. Godoy     SHOULDER SURGERY       SINUS SURGERY       TONSILLECTOMY       TRIGGER FINGER RELEASE       Adhesive; Aspirin (tartrazine only); Cefuroxime axetil; Cephalosporins; Chloral hydrate analogues; Chlorhexidine gluconate; Clindamycin; Codeine; Dairy; Doxepin; Ergot alkaloids; Erythromycin base; Gluten; Hydroxyzine; Latex; Morphine; Nickel; Nortriptyline; Pregabalin; Pseudoephedrine; Terconazole; Tramadol; Trazodone; Venlafaxine; Venom-honey bee; Vistaril [hydroxyzine hcl]; and Metronidazole  Family History   Problem Relation Age of Onset     No Medical Problems Mother      Heart disease Father       Sleep apnea Sister      Snoring Sister      Heart disease Sister      No Medical Problems Daughter      No Medical Problems Maternal Grandmother      No Medical Problems Maternal Grandfather      No Medical Problems Paternal Grandmother      No Medical Problems Paternal Grandfather      No Medical Problems Maternal Aunt      No Medical Problems Paternal Aunt      BRCA 1/2 Neg Hx      Breast cancer Neg Hx      Cancer Neg Hx      Colon cancer Neg Hx      Endometrial cancer Neg Hx      Ovarian cancer Neg Hx      Social History     Socioeconomic History     Marital status:      Spouse name: Not on file     Number of children: Not on file     Years of education: Not on file     Highest education level: Not on file   Occupational History     Not on file   Social Needs     Financial resource strain: Not on file     Food insecurity     Worry: Not on file     Inability: Not on file     Transportation needs     Medical: Not on file     Non-medical: Not on file   Tobacco Use     Smoking status: Never Smoker     Smokeless tobacco: Never Used   Substance and Sexual Activity     Alcohol use: Yes     Frequency: 2-4 times a month     Drinks per session: 1 or 2     Binge frequency: Monthly     Comment: seldom     Drug use: Yes     Types: Marijuana     Sexual activity: Not Currently   Lifestyle     Physical activity     Days per week: Not on file     Minutes per session: Not on file     Stress: Not on file   Relationships     Social connections     Talks on phone: Not on file     Gets together: Not on file     Attends Rastafari service: Not on file     Active member of club or organization: Not on file     Attends meetings of clubs or organizations: Not on file     Relationship status: Not on file     Intimate partner violence     Fear of current or ex partner: Not on file     Emotionally abused: Not on file     Physically abused: Not on file     Forced sexual activity: Not on file   Other Topics Concern     Not on file    Social History Narrative     Not on file         Review of systems is as stated in HPI, and the remainder of the 10 system review is otherwise negative.    Objective:     There were no vitals filed for this visit. There is no height or weight on file to calculate BMI.    Exam: She is alert.  She is speaking clearly.  She does not sound short of breath      This note has been dictated using voice recognition software. Any grammatical or context distortions are unintentional and inherent to the the software.           Phone call duration:  8 minutes    Amirah Moran MD

## 2021-06-09 NOTE — PROGRESS NOTES
Assessment  1. Diarrhea  Comprehensive Metabolic Panel    HM2(CBC w/o Differential)    Thyroid Stimulating Hormone (TSH)    Culture, Stool    Ova and Parasite, Stool    C. Diff Toxin By PCR   2. Health care maintenance  Lipid [place] RANDOM       Plan    1.  Diarrhea: We will start evaluation by obtaining stool for C. diff, stool culture and stool for ova and parasites.  We will check a comprehensive metabolic panel, hemogram and TSH today.  Discussed importance of staying hydrated and advised frequent small amounts of fluid.  Recommend that she follow a bland and low fiber diet.  If stool studies and labs are unremarkable, will plan further evaluation with CT scan of abdomen and pelvis and possibly referral for colonoscopy.  2.  Health maintenance: She is due for lipid cascade.  This is ordered today along with the other labs.      Subjective  Nicole Marcial is a 71 y.o. female who comes in today with concern about diarrhea.  This has been going on for about 6 weeks or so.  She has a history of GI problems and gluten intolerance.  She reports that she traveled to [place] for about 3 weeks earlier this year and she initially attributed the diarrhea to not following a gluten-free diet.  She became more vigilant with monitoring her diet but this did not improve the diarrhea.  She then thought it could be from taking ibuprofen and magnesium so she stopped taking those medications but that does not improve her symptoms either.  She reports that her mother-in-law was in a nursing home for about a month due to influenza so she is concerned that she may have possibly contracted C. diff colitis.  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  She is having frequent loose stools.  No solid or formed stools.  Stools are astorga brown in color.  No blood in stools.  Stools to have a follow order.  She is feeling very fatigued  and run down.  She has had headaches and some dizziness.  She is concerned that she is becoming dehydrated.  Diarrhea does seem to get a little bit better when she eats rice and a little bit worse when she eats salads.  She did have a cold for about 2 weeks when symptoms first started but that has now resolved.  No prior history of diverticulitis.  She does have constant abdominal discomfort across the lower abdomen.  Review of systems is otherwise negative.  Allergies and medications are reviewed and updated in the chart as needed.  No other questions today.    Current Outpatient Prescriptions   Medication Sig Dispense Refill     albuterol (PROVENTIL HFA;VENTOLIN HFA) 90 mcg/actuation inhaler Inhale 2 puffs every 4 (four) hours as needed for wheezing or shortness of breath. 1 Inhaler 1     amLODIPine (NORVASC) 5 MG tablet TAKE ONE TABLET BY MOUTH EVERY DAY 90 tablet 0     busPIRone (BUSPAR) 7.5 MG tablet Take 0.5 tablets (3.75 mg total) by mouth 2 (two) times a day. 90 tablet 3     cholecalciferol, vitamin D3, 1,000 unit tablet Take 2,000 Units by mouth daily.        estradiol-norethindrone (ACTIVELLA) 1-0.5 mg per tablet Take 1 tablet by mouth daily. 30 tablet 11     gabapentin (NEURONTIN) 300 MG capsule TAKE 3 CAPSULES 3 TIMES DAILY TOTAL OF 9 CAPSULES DAILY 270 capsule 11     loratadine (CLARITIN) 10 mg tablet Take 10 mg by mouth daily.       acyclovir (ZOVIRAX) 200 MG capsule Take 1 capsule by mouth every 4 hours while awake for 7 days as needed for cold sores 90 capsule 1     ranitidine (ZANTAC) 300 MG tablet TAKE ONE TABLET BY MOUTH TWICE A DAY AS DIRECTED 180 tablet 3     No current facility-administered medications for this visit.          Objective   Visit Vitals     /66 (Patient Site: Right Arm, Patient Position: Sitting, Cuff Size: Adult Regular)     Pulse (!) 115     Temp 99.3  F (37.4  C) (Tympanic)     Wt 130 lb 5 oz (59.1 kg)     SpO2 98%     BMI 23.83 kg/m2         Gen: alert, no acute  distress  HEENT;  NCAT  Neck: supple  CV: RRR, no murmur  Resp: CTAB, no wheeze/crackles  Abd: normal bowel sounds, soft, TTP across lower abdomen, no rebound or guarding, non-distended, no masses  Ext: warm, dry, no edema     No

## 2021-06-09 NOTE — TELEPHONE ENCOUNTER
RN cannot approve Refill Request    RN can NOT refill this medication Allergy/Contraindication. Last office visit: Visit date not found Last Physical: Visit date not found Last MTM visit: Visit date not found Last visit same specialty: 10/21/2019 Amirah Moran MD.  Next visit within 3 mo: Visit date not found  Next physical within 3 mo: Visit date not found      Tomasa Godinez, [facility] Triage/Med Refill 6/27/2020    Requested Prescriptions   Pending Prescriptions Disp Refills     busPIRone (BUSPAR) 15 MG tablet [Pharmacy Med Name: BUSPIRONE HCL 15MG TABS] 90 tablet 0     Sig: TAKE 1 & 1/2 TABLETS BY MOUTH 2 TIMES DAILY.       Tricyclics/Misc Antidepressant/Antianxiety Meds Refill Protocol Passed - 6/26/2020  4:30 AM        Passed - PCP or prescribing provider visit in last year     Last office visit with prescriber/PCP: Visit date not found OR same dept: Visit date not found OR same specialty: 10/21/2019 Amirah Moran MD  Last physical: Visit date not found Last MTM visit: Visit date not found   Next visit within 3 mo: Visit date not found  Next physical within 3 mo: Visit date not found  Prescriber OR PCP: Luz Valadez MD  Last diagnosis associated with med order: 1. Anxiety  - busPIRone (BUSPAR) 15 MG tablet [Pharmacy Med Name: BUSPIRONE HCL 15MG TABS]; TAKE 1 & 1/2 TABLETS BY MOUTH 2 TIMES DAILY.  Dispense: 90 tablet; Refill: 0    If protocol passes may refill for 12 months if within 3 months of last provider visit (or a total of 15 months).

## 2021-06-10 NOTE — TELEPHONE ENCOUNTER
Pt requesting a referral for orthotics again. LOV 7/2019. Writer informed her message with be sent to Dr. Goode's, she might need an annual f/u scheduled. She does want an OV due to being immunocompromised. Writer informed her that an EV is an option.

## 2021-06-10 NOTE — PROGRESS NOTES
Assessment and Plan:       Nicole was seen today for annual wellness visit.    Routine general medical examination at a health care facility  She is up-to-date on routine health maintenance.  Advised yearly influenza vaccines.  Encouraged regular exercise, healthy diet and adequate calcium and vitamin D intake.  Due for mammogram later this year.  She will bring a copy of advance directives.  Colonoscopy is up-to-date.      Hypertension  -     Basic Metabolic Panel   -Controlled with current dose of amlodipine    Osteoporosis Senile  Continue adequate calcium and vitamin D supplementation.  She continues on HRT    Taking Female Hormones For Postmenopausal HRT    Selective deficiency of IgG (H)  Recent IgG was stable    Recurrent cold sores  Continue acyclovir as needed    Gastroesophageal reflux disease without esophagitis  Advised dietary modifications.  Continue famotidine    Fibromyalgia  Continue amitriptyline.  Discussed we could try increasing the dose.  She would like to consider this.  Encouraged regular exercise and healthy diet and adequate rest    Chronic low back pain, unspecified back pain laterality, unspecified whether sciatica present  Continue with regular exercise    Mild intermittent asthma without complication  Continue albuterol inhaler as needed    Anxiety  Continue buspirone    Primary insomnia  Continue amitriptyline.      The patient's current medical problems were reviewed.    I have had an Advance Directives discussion with the patient.  The following high BMI interventions were performed this visit: encouragement to exercise  The following health maintenance schedule was reviewed with the patient and provided in printed form in the after visit summary:   Health Maintenance   Topic Date Due     ASTHMA ACTION PLAN  1945     Asthma Control Test  05/22/2020     INFLUENZA VACCINE RULE BASED (1) 08/01/2020     DXA SCAN  08/16/2020     MAMMOGRAM  12/24/2020     MEDICARE ANNUAL WELLNESS  VISIT  08/13/2021     FALL RISK ASSESSMENT  08/13/2021     ADVANCE CARE PLANNING  08/13/2021     LIPID  07/14/2025     COLORECTAL CANCER SCREENING  03/30/2027     TD 18+ HE  04/11/2028     HEPATITIS C SCREENING  Completed     PNEUMOCOCCAL IMMUNIZATION 65+ LOW/MEDIUM RISK  Completed     ZOSTER VACCINES  Completed     HEPATITIS B VACCINES  Aged Out        Subjective:   Chief Complaint: Nicole Marcial is an 75 y.o. female here for an Annual Wellness visit.   HPI: We reviewed her health history.  She has history of chronic lower back pain and lumbar radiculopathy as well as anxiety, depression and insomnia.  Also has history of allergic rhinitis, GERD and osteoporosis.  We reviewed and updated her medications and allergies.  She reports that she is currently doing well.  She has gained some weight during the pandemic.  Has been doing a lot of baking and enjoying the treats that she is making.  She reports that she is sleeping well with the use of amitriptyline.  She will occasionally use clonazepam but otherwise anxiety is doing well with buspirone.  She did recently have some blood work done.  Did have slight increase in creatinine and she would like to check her kidney function again today.  She goes to [place] during the wintertime.  States that she continues to have a lot of pain and this does limit her activities as well as affect her mood.  She tries to stay as active as possible.  She is limiting use of ibuprofen due to her kidneys.  She has history of mild intermittent asthma.  This is stable.  Uses albuterol as needed.  She has history of immunoglobulin deficiency.  Recent IgG level was stable.  We reviewed allergies, medications, family and social history.  Chart updated as needed.  Review of systems is otherwise negative.  No other questions today.    Review of Systems:   Please see above.  The rest of the review of systems are negative for all systems.    Patient Care Team:  Amirah Moran MD as PCP -  General (Family Medicine)  Amanuel Godoy MD as Physician (General Surgery)  Viry Chau MD as Physician (Internal Medicine)  Andreia Gutierrez MD as Physician (Allergy)  Toy Hoffman as Physical Therapist (Physical Therapy)  Nemo Dean, PharmD as Pharmacist (Pharmacist)  Amirah Moran MD as Assigned PCP     Patient Active Problem List   Diagnosis     Hypertension     Lower Back Pain     Herpes Simplex Type I     Esophageal Reflux     Renal Cyst     Fibromyalgia     Taking Female Hormones For Postmenopausal HRT     Allergic Rhinitis     Lumbar radiculopathy     Dermatitis     Diarrhea     Serum Enzyme Levels - Lipase Elevated     Osteoporosis Senile     Herniated Disc (L4 - L5) Right     Mixed hyperlipidemia     Selective deficiency of IgG (H)     Arthralgias In Multiple Sites     Temperature Intolerance To Cold   (Consistent)     Chronic Pain     Drug allergy     Guerrero's neuroma of left foot     Anxiety     Primary insomnia     Recurrent cold sores     Mild intermittent asthma without complication     Past Medical History:   Diagnosis Date     Back pain      Chronic pain      Eczema      Fibromyalgia      Hypertension      Mild asthma      Nerve pain      Osteoporosis       Past Surgical History:   Procedure Laterality Date     BACK SURGERY      x3     BUNIONECTOMY       parathyroidectomy N/A 08/16/2016    Dr. Godoy     SHOULDER SURGERY       SINUS SURGERY       TONSILLECTOMY       TRIGGER FINGER RELEASE        Family History   Problem Relation Age of Onset     No Medical Problems Mother      Heart disease Father      Sleep apnea Sister      Snoring Sister      Heart disease Sister      No Medical Problems Daughter      No Medical Problems Maternal Grandmother      No Medical Problems Maternal Grandfather      No Medical Problems Paternal Grandmother      No Medical Problems Paternal Grandfather      No Medical Problems Maternal Aunt      No Medical Problems Paternal Aunt      BRCA 1/2  Neg Hx      Breast cancer Neg Hx      Cancer Neg Hx      Colon cancer Neg Hx      Endometrial cancer Neg Hx      Ovarian cancer Neg Hx       Social History     Socioeconomic History     Marital status:      Spouse name: Not on file     Number of children: Not on file     Years of education: Not on file     Highest education level: Not on file   Occupational History     Not on file   Social Needs     Financial resource strain: Not on file     Food insecurity     Worry: Not on file     Inability: Not on file     Transportation needs     Medical: Not on file     Non-medical: Not on file   Tobacco Use     Smoking status: Never Smoker     Smokeless tobacco: Never Used   Substance and Sexual Activity     Alcohol use: Yes     Frequency: 2-4 times a month     Drinks per session: 1 or 2     Binge frequency: Monthly     Comment: seldom     Drug use: Yes     Types: Marijuana     Sexual activity: Not Currently   Lifestyle     Physical activity     Days per week: Not on file     Minutes per session: Not on file     Stress: Not on file   Relationships     Social connections     Talks on phone: Not on file     Gets together: Not on file     Attends Restorationist service: Not on file     Active member of club or organization: Not on file     Attends meetings of clubs or organizations: Not on file     Relationship status: Not on file     Intimate partner violence     Fear of current or ex partner: Not on file     Emotionally abused: Not on file     Physically abused: Not on file     Forced sexual activity: Not on file   Other Topics Concern     Not on file   Social History Narrative     Not on file      Current Outpatient Medications   Medication Sig Dispense Refill     albuterol (PROAIR HFA;PROVENTIL HFA;VENTOLIN HFA) 90 mcg/actuation inhaler Inhale 2 puffs every 6 (six) hours as needed for wheezing or shortness of breath. 1 Inhaler 5     amitriptyline (ELAVIL) 25 MG tablet Take 1 tablet (25 mg total) by mouth at bedtime. 90  "tablet 3     amLODIPine (NORVASC) 5 MG tablet TAKE ONE TABLET BY MOUTH EVERY DAY 90 tablet 3     busPIRone (BUSPAR) 15 MG tablet TAKE 1 & 1/2 TABLETS BY MOUTH 2 TIMES DAILY. 270 tablet 3     cholecalciferol, vitamin D3, 1,000 unit tablet Take 2,000 Units by mouth daily.        estradiol-norethindrone (ACTIVELLA) 1-0.5 mg per tablet Take 1 tablet by mouth daily. 90 tablet 3     famotidine (FOR PEPCID) 10 MG tablet Take 20 mg by mouth daily. Takes two at night , and one in the morning        famotidine-calcium carbonate-magnesium hydroxide (PEPCID COMPLETE) -165 mg Chew Chew 1 tablet daily as needed.       loratadine (CLARITIN) 10 mg tablet Take 10 mg by mouth daily.       acyclovir (ZOVIRAX) 5 % ointment Apply up to 5 times daily topically 15 g 1     clonazePAM (KLONOPIN) 0.5 MG tablet Take 1 tablet (0.5 mg total) by mouth 2 (two) times a day as needed for anxiety. 60 tablet 0     No current facility-administered medications for this visit.       Objective:   Vital Signs:   Visit Vitals  /70 (Patient Site: Right Arm, Patient Position: Sitting, Cuff Size: Infant)   Pulse (!) 102   Temp 98.3  F (36.8  C) (Oral)   Ht 5' 1\" (1.549 m)   Wt 143 lb 5 oz (65 kg)   SpO2 95%   BMI 27.08 kg/m           VisionScreening:  No exam data present     PHYSICAL EXAM  Physical Examination: General appearance - alert, well appearing, and in no distress  Mental status - alert, oriented to person, place, and time  Eyes - pupils equal and reactive, extraocular eye movements intact  Ears - bilateral TM's and external ear canals normal  Chest - clear to auscultation, no wheezes, rales or rhonchi, symmetric air entry  Heart - normal rate, regular rhythm, normal S1, S2, no murmurs, rubs, clicks or gallops  Abdomen - soft, nontender, nondistended, no masses or organomegaly  Breasts - breasts appear normal, no suspicious masses, no skin or nipple changes or axillary nodes  Neurological - alert, oriented, normal speech, no focal " findings or movement disorder noted  Extremities - peripheral pulses normal, no pedal edema, no clubbing or cyanosis  Skin - normal coloration and turgor, no rashes, no suspicious skin lesions noted      Assessment Results 8/13/2020   Activities of Daily Living No help needed   Instrumental Activities of Daily Living No help needed   Mini Cog Total Score 5   Some recent data might be hidden     A Mini-Cog score of 0-2 suggests the possibility of dementia, score of 3-5 suggests no dementia      Identified Health Risks:     The patient was provided with suggestions to help her develop a healthy emotional lifestyle.   The patient s PHQ-9 score is consistent with moderate depression.  She was provided with information regarding depression and was advised to schedule a follow up appointment   Patient's advanced directive was discussed and I am comfortable with the patient's wishes.

## 2021-06-11 ENCOUNTER — COMMUNICATION - HEALTHEAST (OUTPATIENT)
Dept: FAMILY MEDICINE | Facility: CLINIC | Age: 76
End: 2021-06-11

## 2021-06-11 DIAGNOSIS — I10 ESSENTIAL HYPERTENSION: ICD-10-CM

## 2021-06-11 NOTE — PROGRESS NOTES
Assessment and Plan:       1. Hypertension  -Controlled  - amLODIPine (NORVASC) 5 MG tablet; TAKE ONE TABLET BY MOUTH EVERY DAY  Dispense: 90 tablet; Refill: 3  - Comprehensive Metabolic Panel    2. Osteoporosis  -Follows with osteoporosis specialist.  Next appointment is in June 2017  - Vitamin D, Total (25-Hydroxy)  -Continue adequate calcium supplementation  -Encouraged regular weightbearing activity    3. Health care maintenance  -Fasting glucose today  - Lipid Cascade  -Vaccines up-to-date.  -Up-to-date on mammogram and colonoscopy    4. Hearing loss    - Ambulatory referral to Audiology    5. IgG deficiency    - Immunoglobulins IgG, IgA, IgM    6. Herpes Simplex Type I  -Continue acyclovir as needed.  Refill provided    7. Lumbago  -She will continue with the medical marijuana program to help with pain management.    -We will start Cymbalta.  Provided prescription.  Counseled on use of medication and side effects.  Follow-up in 1 month    8. Mild persistent asthma  -Doing well.  ACT score 23.  Asthma action plan completed  -Continue albuterol inhaler.    9. Anxiety  -Continue buspirone.  Start Cymbalta.  Follow-up in 1 month    10. Major depression, chronic  -Start Cymbalta.  Discussed use of medication and side effects.  Follow-up in 1 month      The patient's current medical problems were reviewed.    The following are part of a depression follow up plan for the patient:  management of mental health treatment  The following health maintenance schedule was reviewed with the patient and provided in printed form in the after visit summary:   Health Maintenance   Topic Date Due     TD 18+ HE  03/13/2018     ASTHMA CONTROL TEST  03/16/2018     ASTHMA FOLLOW-UP  05/31/2018     FALL RISK ASSESSMENT  05/31/2018     ADVANCE DIRECTIVES DISCUSSED WITH PATIENT  05/31/2018     DXA SCAN  06/14/2018     MAMMOGRAM  12/16/2018     COLONOSCOPY  03/30/2027     PNEUMOCOCCAL POLYSACCHARIDE VACCINE AGE 65 AND OVER  Completed      INFLUENZA VACCINE RULE BASED  Completed     PNEUMOCOCCAL CONJUGATE VACCINE FOR ADULTS (PCV13 OR PREVNAR)  Completed     ZOSTER VACCINE  Completed        Subjective:   Chief Complaint: Nicole Marcial is an 72 y.o. female here for an Annual Wellness visit.   HPI: We reviewed her health history.  Notable for chronic lower back pain.  Also anxiety and depression.  Recently had diarrhea.  Underwent colonoscopy.  That was normal.  Stopped taking her magnesium supplement.  Started this for leg spasms.  Diarrhea improved since stopping the supplement so she feels that this was probably related to the magnesium.  We reviewed allergies and medications.  She is scheduled to follow-up with osteoporosis specialist next month.  Continues on hormone replacement therapy.  Also takes vitamin D supplement.  Receives Prolia injections every 6 months.  She takes Zantac for esophageal reflux.  She is currently on buspirone for anxiety.  Continue to have anxiety and is not sure if this is working.  Also feeling more depressed related to her chronic lower back pain and neuropathy.  She is taking gabapentin.  She is also working with the medical marijuana program and is doing some medication adjustments.  Thus far has had limited success.  She has been seen at pain clinics in the past.  Has done physical therapy does not really progress with that.  She has not tolerated narcotic pain medications.  She does try to stay active and does 3 exercise classes per week and walks daily.  The chronic pain does adversely affect her lifestyle.  She also has mild intermittent asthma.  This has been doing well.  She does have allergies.  History of immunoglobulin deficiency and is due for a check of that.  Reviewed allergies, medications, family and social history.  Chart updated as needed.  On review of systems she reports concern about hearing loss.  Review of systems is noted in the yellow paperwork that patient completed today and is reviewed  and discussed    Review of Systems:  Please see above.  The rest of the review of systems are negative for all systems.    Patient Care Team:  Amirah Moran MD as PCP - General (Family Medicine)  Amanuel Godoy MD as Physician (General Surgery)  Viry Chau MD as Physician (Internal Medicine)  Andreia Gutierrez MD as Physician (Allergy)  Toy Hoffman as Physical Therapist (Physical Therapy)     Patient Active Problem List   Diagnosis     Hypertension     Lower Back Pain     Herpes Simplex Type I     Esophageal Reflux     Renal Cyst     Fibromyalgia     Taking Female Hormones For Postmenopausal HRT     Allergic Rhinitis     Mild persistent asthma     Lumbar Radiculopathy     Dermatitis     Diarrhea     Serum Enzyme Levels - Lipase Elevated     Osteoporosis Senile     Herniated Disc (L4 - L5) Right     Hyperlipidemia     Selective IgG Deficiency     Arthralgias In Multiple Sites     Temperature Intolerance To Cold   (Consistent)     Chronic Pain     Drug allergy     Guerrero's neuroma of left foot     Hypercalcemia     Hyperparathyroidism     Past Medical History:   Diagnosis Date     Back pain      Chronic pain      Eczema      Fibromyalgia      Hypertension      Mild asthma      Nerve pain      Osteoporosis       Past Surgical History:   Procedure Laterality Date     BACK SURGERY      x3     BUNIONECTOMY       parathyroidectomy N/A 08/16/2016    Dr. Godoy     SHOULDER SURGERY       SINUS SURGERY       TONSILLECTOMY       TRIGGER FINGER RELEASE        Family History   Problem Relation Age of Onset     No Medical Problems Mother      Heart disease Father      Sleep apnea Sister      Snoring Sister      Heart disease Sister      No Medical Problems Daughter      No Medical Problems Maternal Grandmother      No Medical Problems Maternal Grandfather      No Medical Problems Paternal Grandmother      No Medical Problems Paternal Grandfather      No Medical Problems Maternal Aunt      No Medical Problems  Paternal Aunt      BRCA 1/2 Neg Hx      Breast cancer Neg Hx      Cancer Neg Hx      Colon cancer Neg Hx      Endometrial cancer Neg Hx      Ovarian cancer Neg Hx       Social History     Social History     Marital status:      Spouse name: N/A     Number of children: N/A     Years of education: N/A     Occupational History     Not on file.     Social History Main Topics     Smoking status: Former Smoker     Smokeless tobacco: Never Used      Comment: only in college     Alcohol use Yes      Comment: seldom     Drug use: No     Sexual activity: Not Currently     Other Topics Concern     Not on file     Social History Narrative      Current Outpatient Prescriptions   Medication Sig Dispense Refill     acyclovir (ZOVIRAX) 200 MG capsule Take 1 capsule by mouth every 4 hours while awake for 7 days as needed for cold sores 30 capsule 5     albuterol (PROAIR HFA;PROVENTIL HFA;VENTOLIN HFA) 90 mcg/actuation inhaler Inhale 2 puffs every 6 (six) hours as needed for wheezing or shortness of breath. 1 Inhaler 5     amLODIPine (NORVASC) 5 MG tablet TAKE ONE TABLET BY MOUTH EVERY DAY 90 tablet 3     busPIRone (BUSPAR) 7.5 MG tablet Take 0.5 tablets (3.75 mg total) by mouth 2 (two) times a day. 90 tablet 3     cholecalciferol, vitamin D3, 1,000 unit tablet Take 2,000 Units by mouth daily.        estradiol-norethindrone (ACTIVELLA) 1-0.5 mg per tablet Take 1 tablet by mouth daily. 90 tablet 3     gabapentin (NEURONTIN) 300 MG capsule TAKE 3 CAPSULES 3 TIMES DAILY TOTAL OF 9 CAPSULES DAILY 270 capsule 11     loratadine (CLARITIN) 10 mg tablet Take 10 mg by mouth daily.       ranitidine (ZANTAC) 300 MG tablet TAKE ONE TABLET BY MOUTH TWICE A DAY AS DIRECTED 180 tablet 3     DULoxetine (CYMBALTA) 20 MG capsule Take 1 capsule (20 mg total) by mouth daily. 30 capsule 2     No current facility-administered medications for this visit.       Objective:   Vital Signs:   Visit Vitals     /64 (Patient Site: Right Arm, Patient  "Position: Sitting, Cuff Size: Adult Regular)     Pulse 77     Ht 5' 1\" (1.549 m)     Wt 133 lb 4 oz (60.4 kg)     SpO2 99%     BMI 25.18 kg/m2        VisionScreening:  No exam data present     PHYSICAL EXAM  /64 (Patient Site: Right Arm, Patient Position: Sitting, Cuff Size: Adult Regular)  Pulse 77  Ht 5' 1\" (1.549 m)  Wt 133 lb 4 oz (60.4 kg)  SpO2 99%  BMI 25.18 kg/m2  General appearance: alert, appears stated age and cooperative  Head: Normocephalic, without obvious abnormality, atraumatic  Eyes: conjunctivae/corneas clear. PERRL, EOM's intact. Fundi benign.  Ears: normal TM's and external ear canals both ears  Nose: Nares normal. Septum midline. Mucosa normal. No drainage or sinus tenderness.  Throat: lips, mucosa, and tongue normal; teeth and gums normal  Neck: no adenopathy, no carotid bruit, no JVD, supple, symmetrical, trachea midline and thyroid not enlarged, symmetric, no tenderness/mass/nodules  Back: symmetric, no curvature. ROM normal. No CVA tenderness.  Lungs: clear to auscultation bilaterally  Breasts: normal appearance, no masses or tenderness  Heart: regular rate and rhythm, S1, S2 normal, no murmur, click, rub or gallop  Abdomen: soft, non-tender; bowel sounds normal; no masses,  no organomegaly  Pelvic: cervix normal in appearance, external genitalia normal, no adnexal masses or tenderness, no cervical motion tenderness, uterus normal size, shape, and consistency, vagina normal without discharge and atrophic vaginitis  Extremities: extremities normal, atraumatic, no cyanosis or edema  Pulses: 2+ and symmetric  Skin: Skin color, texture, turgor normal. No rashes or lesions  Lymph nodes: Cervical, supraclavicular, and axillary nodes normal.  Neurologic: Grossly normal    Assessment Results 5/31/2017   Activities of Daily Living No help needed   Instrumental Activities of Daily Living No help needed   Mini Cog Total Score 5     A Mini-Cog score of 0-2 suggests the possibility of " dementia, score of 3-5 suggests no dementia    Identified Health Risks:     The patient was provided with suggestions to help her develop a healthy lifestyle.   The patient was counseled and encouraged to consider modifying their diet and eating habits. She was provided with information on recommended healthy diet options.  The patient's PHQ-9 score is consistent with mild depression. She was provided with information regarding depression and was advised to schedule a follow up appointment in 4 weeks to further address this issue.  The patient's GHAZALA-7 score is consistent with probably anxiety disorder.  She was provided with patient information regarding anxiety and was advised to set up a follow up appointment in 4   weeks to further address this issue.  Information regarding advance directives (living [place]), including where she can download the appropriate form, was provided to the patient via the AVS.

## 2021-06-11 NOTE — PROGRESS NOTES
"Audiology only; referred by Amirah Moran    History:  Please see chart note/results dated 9-23-14 for background information; here for annual retesting. No subjective changes, per patient; her greatest difficulty is hearing her , who reportedly \"mumbles\". She is aware of appropriate communication strategies, as these were discussed at her last evaluation, and additionally, her  has hearing loss and uses amplification. She denied tinnitus, dizziness, aural fullness, significant otalgia, or recent illness.    Results:  Otoscopy was unremarkable in either ear. Hearing sensitivity was assessed with good reliability using insert phones.      Normal hearing sensitivity, sloping to mild-moderate sensorineural hearing loss (by history), bilaterally, for 250-8000 Hz. Thresholds are essentially stable per 9-23-14 audiogram.     Speech reception thresholds showed agreement with pure tone findings in each ear. Word recognition ability was excellent, bilaterally, with presentation levels above typical conversational volume in both ears.    Tympanometry was not repeated today.    Recommendations:  Follow-up with PCP; retest hearing annually (to monitor) or per medical management.  Wear hearing protection consistently in noise to preserve residual hearing sensitivity. Appropriate communication strategies were again reviewed.  Nicole Marcial is a potential binaural amplification candidate, if patient motivation exists and medical clearance is granted. A copy of today's audiogram was provided to Ms. Marcial, who expressed an interest in [facility] for amplification.    Yaz Mims, [facility]  [place] Licensed Audiologist [id number]          "

## 2021-06-11 NOTE — TELEPHONE ENCOUNTER
Rx for amitriptyline was accidentally sent to [facility] in [place], [place].  Please cancel this prescription.  I re-sent it to [facility] in [place]

## 2021-06-11 NOTE — PROGRESS NOTES
Assessment/Plan:        1. Osteoporosis Senile  DXA Bone Density Scan       Return in about 1 year (around 6/21/2018) for Recheck.    Patient Instructions   You will need to set up Reclast infusion at [facility] center.  I will see you in 1 year. DXA in 1 year.      Chief Complaint   Patient presents with     Osteoporosis Follow Up     Patient is here today for the follow-up of osteoporosis.  She had a bone density scan done today which showed T score of -2.3 in her spine as the lowest T score.  Her both hips are in osteopenia range, stable compared to 2016.  She does have some osteoarthritis in the spine which show falsely elevated bone density.  Patient was getting Prolia injections over the last few years and her eczema always flared up with the Prolia injections.  She would like to try to avoid Prolia treatment in the future.  A year ago, she had parathyroid adenoma removed.  She is now taking calcium mostly from her diet but not taking any supplements.  She does take vitamin D.  She will need to add additional  500 mg of calcium from the supplements and adds to her diet.  We discussed other possible treatment options and she did take Fosamax and Boniva in the past which were giving her significant GI disturbance.  I ordered Reclast infusion once a year and patient agreed to try this treatment.  Her renal function tests are normal.  I will see her for follow-up in a year with repeat a bone density scan at that time.    /60  Pulse 72  Wt 131 lb (59.4 kg)  BMI 24.75 kg/m2  25 minutes spent with the patient and more then 50 % of the time in counseling.  This note has been dictated using voice recognition software. Any grammatical or context distortions are unintentional and inherent to the software      Patient Active Problem List   Diagnosis     Hypertension     Lower Back Pain     Herpes Simplex Type I     Esophageal Reflux     Renal Cyst     Fibromyalgia     Taking Female Hormones For Postmenopausal  HRT     Allergic Rhinitis     Mild persistent asthma     Lumbar Radiculopathy     Dermatitis     Diarrhea     Serum Enzyme Levels - Lipase Elevated     Osteoporosis Senile     Herniated Disc (L4 - L5) Right     Hyperlipidemia     Selective IgG Deficiency     Arthralgias In Multiple Sites     Temperature Intolerance To Cold   (Consistent)     Chronic Pain     Drug allergy     Guerrero's neuroma of left foot     Hypercalcemia     Hyperparathyroidism       Current Outpatient Prescriptions on File Prior to Visit   Medication Sig Dispense Refill     acyclovir (ZOVIRAX) 200 MG capsule Take 1 capsule by mouth every 4 hours while awake for 7 days as needed for cold sores 30 capsule 5     albuterol (PROAIR HFA;PROVENTIL HFA;VENTOLIN HFA) 90 mcg/actuation inhaler Inhale 2 puffs every 6 (six) hours as needed for wheezing or shortness of breath. 1 Inhaler 5     amLODIPine (NORVASC) 5 MG tablet TAKE ONE TABLET BY MOUTH EVERY DAY 90 tablet 3     busPIRone (BUSPAR) 7.5 MG tablet Take 0.5 tablets (3.75 mg total) by mouth 2 (two) times a day. 90 tablet 3     cholecalciferol, vitamin D3, 1,000 unit tablet Take 2,000 Units by mouth daily.        DULoxetine (CYMBALTA) 20 MG capsule Take 1 capsule (20 mg total) by mouth daily. 30 capsule 2     estradiol-norethindrone (ACTIVELLA) 1-0.5 mg per tablet Take 1 tablet by mouth daily. 90 tablet 3     gabapentin (NEURONTIN) 300 MG capsule TAKE 3 CAPSULES 3 TIMES DAILY TOTAL OF 9 CAPSULES DAILY 270 capsule 11     loratadine (CLARITIN) 10 mg tablet Take 10 mg by mouth daily.       ranitidine (ZANTAC) 300 MG tablet TAKE ONE TABLET BY MOUTH TWICE A DAY AS DIRECTED 180 tablet 3     No current facility-administered medications on file prior to visit.

## 2021-06-12 NOTE — TELEPHONE ENCOUNTER
Called and spoke with patient. Offered her an appointment tomorrow with Dr. Sen Davidson but patient states she would really like to see Dr. Moran and is wondering if there is any way you could work her into your schedule today or tomorrow?

## 2021-06-12 NOTE — PATIENT INSTRUCTIONS - HE
We will check labs today.    Continue to monitor your blood pressure and pulse.    I will follow-up with you with lab results and then we can decide whether to change your medication based on your blood pressure and pulse readings over the next few days.

## 2021-06-12 NOTE — PROGRESS NOTES
Assessment/Plan:     1. Chronic Pain     2. Arthralgias In Multiple Sites     3. Lumbar radiculopathy     4. Fibromyalgia     5. Lumbago         Diagnoses and all orders for this visit:    Chronic Pain    Arthralgias In Multiple Sites    Lumbar radiculopathy    Fibromyalgia    Lumbago    Other orders  -     celecoxib (CELEBREX) 100 MG capsule; Take 1 capsule (100 mg total) by mouth 2 (two) times a day.  Dispense: 180 capsule; Refill: 2  -     desvenlafaxine succinate (PRISTIQ) 50 MG 24 hr tablet; Take 1 tablet (50 mg total) by mouth daily.  Dispense: 14 tablet; Refill: 3       Discussed pain management options.  Very limited at this time.  She has tried a number of different medications and has had adverse side effects.  At this time, I think it would be reasonable to cautiously continue use of ibuprofen.  We have been monitoring her renal function on a regular basis and would continue to do so.  I think it is reasonable to do a trial of Celebrex and she has not tried this yet as this may cause less GI side effects.  Provided prescription for Celebrex 100 mg capsules and directed her to take 1-2 capsules daily.  We will plan to do a referral for the [facility] pain management program when she is ready.  She will continue with the medical marijuana program.  Discussed doing another trial of an antidepressant medication and suggested Pristiq.  She is agreeable to that.  Provided prescription for 50 mg tablets.  Directed her to take 1 tablet daily.  She will notify me if she has any adverse side effects with the medication.  Also suggest a consultation with the medication therapy management pharmacist.  She would like to try the Pristiq first and if that is not effective, she would like to meet with the pharmacist.    Subjective:      Nicole Marcial is a 72 y.o. female who comes in today to discuss pain management.  She recently saw her osteoporosis specialist who advised her to talk to me about alternatives to  ibuprofen.  She has history of chronic pain due to chronic lower back pain and lumbar radiculopathy.  She has had several back surgeries.  She also has fibromyalgia and multiple joint pains.  She has tried a number of pain medications in the past.  Has not done well with any of them.  Previously followed at the pain clinic.  Prefers to avoid narcotics.  She has had adverse reactions to codeine, tramadol, Lyrica and gabapentin.  Currently uses ibuprofen about 2 per day.  This does help with her headaches as well as some of her pain.  She does not get a lot of GI upset related to this medication.  She uses ranitidine.  Her kidney function has always been normal.  In the past she took Vioxx but that was taken off the market.  She wonders whether Celebrex would be a better option for her compared to ibuprofen.  She has also tried topical creams and lidocaine patches without much relief.  She is also a part of the medical marijuana program.  Will occasionally find some relief of her pain with medications that she gets through that program but is very cautious with her use of those medications.  She most recently was started on Cymbalta in hopes that 2 doses and then had significant trouble sleeping as well as elevated heart rates are discontinued the medication.  She had similar experience in the past it would help with some of her chronic pain as well as depression.  She took that for with use of venlafaxine.  She has not tolerated a number of other SSRI medications.  She is hesitant about continuing to try alternative antidepressant/antianxiety medications but is willing to do so.  In the past she had gotten a referral to the pain management program at the [facility] but was not able to work it into her schedule at that time.  She is considering seeking another referral but does not want to start that process at this time as they are planning to travel to [place] for 4 months leaving in November.  She will contact me  when she wants to pursue that referral.  No other concerns or questions.  Reviewed medications and allergies    Current Outpatient Prescriptions   Medication Sig Dispense Refill     acyclovir (ZOVIRAX) 200 MG capsule Take 1 capsule by mouth every 4 hours while awake for 7 days as needed for cold sores 30 capsule 5     amLODIPine (NORVASC) 5 MG tablet TAKE ONE TABLET BY MOUTH EVERY DAY 90 tablet 3     busPIRone (BUSPAR) 7.5 MG tablet Take 0.5 tablets (3.75 mg total) by mouth 2 (two) times a day. 90 tablet 3     cholecalciferol, vitamin D3, 1,000 unit tablet Take 2,000 Units by mouth daily.        estradiol-norethindrone (ACTIVELLA) 1-0.5 mg per tablet Take 1 tablet by mouth daily. 90 tablet 3     gabapentin (NEURONTIN) 300 MG capsule TAKE THREE CAPSULES BY MOUTH THREE TIMES A  capsule 11     loratadine (CLARITIN) 10 mg tablet Take 10 mg by mouth daily.       ranitidine (ZANTAC) 300 MG tablet TAKE ONE TABLET BY MOUTH TWICE A DAY AS DIRECTED 180 tablet 3     albuterol (PROAIR HFA;PROVENTIL HFA;VENTOLIN HFA) 90 mcg/actuation inhaler Inhale 2 puffs every 6 (six) hours as needed for wheezing or shortness of breath. 1 Inhaler 5     celecoxib (CELEBREX) 100 MG capsule Take 1 capsule (100 mg total) by mouth 2 (two) times a day. 180 capsule 2     desvenlafaxine succinate (PRISTIQ) 50 MG 24 hr tablet Take 1 tablet (50 mg total) by mouth daily. 14 tablet 3     DULoxetine (CYMBALTA) 20 MG capsule Take 1 capsule (20 mg total) by mouth daily. 30 capsule 2     No current facility-administered medications for this visit.        Past Medical History, Family History, and Social History reviewed.  Past Medical History:   Diagnosis Date     Back pain      Chronic pain      Eczema      Fibromyalgia      Hypertension      Mild asthma      Nerve pain      Osteoporosis      Past Surgical History:   Procedure Laterality Date     BACK SURGERY      x3     BUNIONECTOMY       parathyroidectomy N/A 08/16/2016    Dr. Godoy     SHOULDER  SURGERY       SINUS SURGERY       TONSILLECTOMY       TRIGGER FINGER RELEASE       Adhesive; Amitriptyline; Aspirin (tartrazine only); Cefuroxime axetil; Cephalosporins; Chloral hydrate analogues; Chlorhexidine gluconate; Clindamycin; Codeine; Dairy; Doxepin; Ergot alkaloids; Erythromycin base; Gluten; Hydroxyzine; Latex; Morphine; Nickel; Nortriptyline; Pregabalin; Pseudoephedrine; Terconazole; Tramadol; Venlafaxine; Venom-honey bee; Vistaril [hydroxyzine hcl]; and Metronidazole  Family History   Problem Relation Age of Onset     No Medical Problems Mother      Heart disease Father      Sleep apnea Sister      Snoring Sister      Heart disease Sister      No Medical Problems Daughter      No Medical Problems Maternal Grandmother      No Medical Problems Maternal Grandfather      No Medical Problems Paternal Grandmother      No Medical Problems Paternal Grandfather      No Medical Problems Maternal Aunt      No Medical Problems Paternal Aunt      BRCA 1/2 Neg Hx      Breast cancer Neg Hx      Cancer Neg Hx      Colon cancer Neg Hx      Endometrial cancer Neg Hx      Ovarian cancer Neg Hx      Social History     Social History     Marital status:      Spouse name: N/A     Number of children: N/A     Years of education: N/A     Occupational History     Not on file.     Social History Main Topics     Smoking status: Former Smoker     Smokeless tobacco: Never Used      Comment: only in college     Alcohol use Yes      Comment: seldom     Drug use: No     Sexual activity: Not Currently     Other Topics Concern     Not on file     Social History Narrative         Review of systems is as stated in HPI, and the remainder of the 10 system review is otherwise negative.    Objective:     Vitals:    07/31/17 1420   BP: 126/74   Patient Site: Left Arm   Patient Position: Sitting   Cuff Size: Adult Regular   Pulse: 76   Temp: 99.4  F (37.4  C)   TempSrc: Tympanic   SpO2: 96%   Weight: 133 lb 7 oz (60.5 kg)    Body mass  index is 24.41 kg/(m^2).    General appearance: alert, appears stated age and cooperative  Neurologic: Grossly normal  Psych: mood appropriate, affect normal    This note has been dictated using voice recognition software. Any grammatical or context distortions are unintentional and inherent to the the software.

## 2021-06-12 NOTE — TELEPHONE ENCOUNTER
Patient Returning Call  Reason for call:  Pt, would like to schedule appt  Information relayed to patient:  yes  Patient has additional questions:  Yes  If YES, what are your questions/concerns:  Pt, needs to be seen for elevated heart rate and blood pressure. Pt, would like a call back to schedule something sooner.  Please call pt, at [phone]  Okay to leave a detailed message?: Yes

## 2021-06-12 NOTE — TELEPHONE ENCOUNTER
Appointment scheduled 10/8/2020 with DR Moran. Pt advised to arrive by 11:30 and can only address blood  pressure and heart rate.

## 2021-06-12 NOTE — PROGRESS NOTES
Assessment/Plan:     1. Tachycardia  Electrocardiogram Perform and Read    Electrocardiogram Perform and Read    Basic Metabolic Panel    HM2(CBC w/o Differential)    Thyroid Stimulating Hormone (TSH)   2. Benign essential hypertension  Basic Metabolic Panel    Thyroid Stimulating Hormone (TSH)       Diagnoses and all orders for this visit:    Tachycardia  -     Electrocardiogram Perform and Read  -     Electrocardiogram Perform and Read  -     Basic Metabolic Panel  -     HM2(CBC w/o Differential)  -     Thyroid Stimulating Hormone (TSH)    Benign essential hypertension  -     Basic Metabolic Panel  -     Thyroid Stimulating Hormone (TSH)       EKG shows sinus tachycardia.  Hemogram shows normal hemoglobin.  Will check basic metabolic panel as well as TSH to evaluate for any changes in electrolytes, kidney function or thyroid function which could be contributing to her tachycardia and elevated blood pressure reading.  At this point, I suspect the tachycardia and elevated blood pressure reading were most likely secondary to anxiety.  We did discuss possibly increasing the dose of her amlodipine but she prefers to wait for results of labs and continue to monitor heart rate and blood pressure over the next few days.  She will try to increase her fluid intake.  She will reschedule her SI joint injection and we discussed that she could take a dose of lorazepam prior to the injection to help reduce anxiety.  We will have further recommendations once results are available.        Subjective:      Nicole Marcial is a 75 y.o. female comes in today for concern of elevated blood pressure and tachycardia.  Patient was at [facility] yesterday getting ready to receive an SI joint injection.  However her blood pressure was quite elevated at 171/83 and her heart rate was consistently between 132 and 136.  The staff decided that they were not able to perform the injection yesterday due to her elevated blood pressure and  tachycardia.  She was advised to follow-up with her PCP.  She went home and she took a dose of amlodipine 5 mg last evening.  She thinks that it is possible she did not take her morning dose of amlodipine yesterday morning and that is why her blood pressure was high.  She did continue to monitor her blood pressure last evening and her blood pressure improved and gradually decreased to 108/45 at 8:45 PM.  Her heart rate also gradually decreased to 98 before bed.  This morning her blood pressure was 144/68 and heart rate was 98 and her blood pressure at 1030 was 118/65 and her pulse was 94.  Her blood pressure currently is little bit elevated and heart rate is 135.  She admits that she is anxious.  She is concerned about possibility of atrial fibrillation as her mother had a history of that.  Patient has not noticed any irregular heartbeats or palpitations.  She has not felt dizzy or lightheaded.  She has not had chest pain or pressure.  She is otherwise feeling fine and denies any symptoms of acute illness.  States that she has not been drinking any increased caffeine.  She believes that she is well-hydrated as she drinks plenty of water.  She was treated about 2 weeks ago with a Medrol Dosepak for a flareup of lower back pain after hiking.  Otherwise she has not had any recent medication changes.  We reviewed her current medications and allergies and updated the chart.  Review of systems is otherwise negative.  No other questions today.    Current Outpatient Medications   Medication Sig Dispense Refill     albuterol (PROAIR HFA;PROVENTIL HFA;VENTOLIN HFA) 90 mcg/actuation inhaler Inhale 2 puffs every 6 (six) hours as needed for wheezing or shortness of breath. 1 Inhaler 5     amitriptyline (ELAVIL) 25 MG tablet Take 2 tablets at bedtime. May increase to 3 tablets at bedtime if tolerated. 270 tablet 3     amLODIPine (NORVASC) 5 MG tablet TAKE ONE TABLET BY MOUTH EVERY DAY 90 tablet 3     busPIRone (BUSPAR) 15 MG  tablet TAKE 1 & 1/2 TABLETS BY MOUTH 2 TIMES DAILY. 270 tablet 3     cholecalciferol, vitamin D3, 1,000 unit tablet Take 2,000 Units by mouth daily.        estradiol-norethindrone (ACTIVELLA) 1-0.5 mg per tablet Take 1 tablet by mouth daily. 90 tablet 3     famotidine (FOR PEPCID) 10 MG tablet Take 20 mg by mouth daily. Takes two at night , and one in the morning        famotidine-calcium carbonate-magnesium hydroxide (PEPCID COMPLETE) -165 mg Chew Chew 1 tablet daily as needed.       loratadine (CLARITIN) 10 mg tablet Take 10 mg by mouth daily.       acyclovir (ZOVIRAX) 5 % ointment Apply up to 5 times daily topically 15 g 1     clonazePAM (KLONOPIN) 0.5 MG tablet Take 1 tablet (0.5 mg total) by mouth 2 (two) times a day as needed for anxiety. 60 tablet 0     No current facility-administered medications for this visit.        Past Medical History, Family History, and Social History reviewed.  Past Medical History:   Diagnosis Date     Back pain      Chronic pain      Eczema      Fibromyalgia      Hypertension      Mild asthma      Nerve pain      Osteoporosis      Past Surgical History:   Procedure Laterality Date     BACK SURGERY      x3     BUNIONECTOMY       parathyroidectomy N/A 08/16/2016    Dr. Godoy     SHOULDER SURGERY       SINUS SURGERY       TONSILLECTOMY       TRIGGER FINGER RELEASE       Adhesive, Aspirin (tartrazine only), Cefuroxime axetil, Cephalosporins, Chloral hydrate analogues, Chlorhexidine gluconate, Clindamycin, Codeine, Dairy, Doxepin, Ergot alkaloids, Erythromycin base, Gluten, Hydroxyzine, Latex, Morphine, Nickel, Nortriptyline, Pregabalin, Pseudoephedrine, Terconazole, Tramadol, Trazodone, Venlafaxine, Venom-honey bee, Vistaril [hydroxyzine hcl], and Metronidazole  Family History   Problem Relation Age of Onset     No Medical Problems Mother      Heart disease Father      Sleep apnea Sister      Snoring Sister      Heart disease Sister      No Medical Problems Daughter      No  Medical Problems Maternal Grandmother      No Medical Problems Maternal Grandfather      No Medical Problems Paternal Grandmother      No Medical Problems Paternal Grandfather      No Medical Problems Maternal Aunt      No Medical Problems Paternal Aunt      BRCA 1/2 Neg Hx      Breast cancer Neg Hx      Cancer Neg Hx      Colon cancer Neg Hx      Endometrial cancer Neg Hx      Ovarian cancer Neg Hx      Social History     Socioeconomic History     Marital status:      Spouse name: Not on file     Number of children: Not on file     Years of education: Not on file     Highest education level: Not on file   Occupational History     Not on file   Social Needs     Financial resource strain: Not on file     Food insecurity     Worry: Not on file     Inability: Not on file     Transportation needs     Medical: Not on file     Non-medical: Not on file   Tobacco Use     Smoking status: Never Smoker     Smokeless tobacco: Never Used   Substance and Sexual Activity     Alcohol use: Yes     Frequency: 2-4 times a month     Drinks per session: 1 or 2     Binge frequency: Monthly     Comment: seldom     Drug use: Yes     Types: Marijuana     Sexual activity: Not Currently   Lifestyle     Physical activity     Days per week: Not on file     Minutes per session: Not on file     Stress: Not on file   Relationships     Social connections     Talks on phone: Not on file     Gets together: Not on file     Attends [employer] service: Not on file     Active member of club or organization: Not on file     Attends meetings of clubs or organizations: Not on file     Relationship status: Not on file     Intimate partner violence     Fear of current or ex partner: Not on file     Emotionally abused: Not on file     Physically abused: Not on file     Forced sexual activity: Not on file   Other Topics Concern     Not on file   Social History Narrative     Not on file         Review of systems is as stated in HPI, and the remainder of  the 10 system review is otherwise negative.    Objective:     Vitals:    10/08/20 1134   BP: 149/72   Patient Site: Right Arm   Patient Position: Sitting   Cuff Size: Adult Large   Pulse: (!) 135   Temp: 98.6  F (37  C)   TempSrc: Oral   SpO2: 96%   Weight: 147 lb 7 oz (66.9 kg)    Body mass index is 27.86 kg/m .    General appearance: alert, appears stated age and cooperative  Head: Normocephalic, without obvious abnormality, atraumatic  Lungs: clear to auscultation bilaterally  Heart: regular rate and rhythm, S1, S2 normal, no murmur, click, rub or gallop  Extremities: extremities normal, atraumatic, no cyanosis or edema    EKG was performed in clinic.  This was personally reviewed.  Per my read it showed sinus tachycardia with heart rate of 117    Recent Results (from the past 24 hour(s))   Electrocardiogram Perform and Read    Collection Time: 10/08/20 12:00 PM   Result Value Ref Range    SYSTOLIC BLOOD PRESSURE      DIASTOLIC BLOOD PRESSURE      VENTRICULAR RATE 117 BPM    ATRIAL RATE 117 BPM    P-R INTERVAL 152 ms    QRS DURATION 68 ms    Q-T INTERVAL 306 ms    QTC CALCULATION (BEZET) 426 ms    P Axis 57 degrees    R AXIS 46 degrees    T AXIS 57 degrees    MUSE DIAGNOSIS       Sinus tachycardia  Otherwise normal ECG  When compared with ECG of 20-JUL-2016 14:03,  No significant change was found     HM2(CBC w/o Differential)    Collection Time: 10/08/20 12:12 PM   Result Value Ref Range    WBC 11.9 (H) 4.0 - 11.0 thou/uL    RBC 4.46 3.80 - 5.40 mill/uL    Hemoglobin 14.2 12.0 - 16.0 g/dL    Hematocrit 43.1 35.0 - 47.0 %    MCV 97 80 - 100 fL    MCH 31.8 27.0 - 34.0 pg    MCHC 32.9 32.0 - 36.0 g/dL    RDW 12.7 11.0 - 14.5 %    Platelets 249 140 - 440 thou/uL    MPV 7.8 7.0 - 10.0 fL       This note has been dictated using voice recognition software. Any grammatical or context distortions are unintentional and inherent to the the software.

## 2021-06-13 RX ORDER — AMLODIPINE BESYLATE 5 MG/1
TABLET ORAL
Qty: 90 TABLET | Refills: 3 | Status: SHIPPED | OUTPATIENT
Start: 2021-06-13 | End: 2021-08-17

## 2021-06-13 NOTE — TELEPHONE ENCOUNTER
Refill Approved    Rx renewed per Medication Renewal Policy. Medication was last renewed on 10/21/19.    Jacinda Hutchinson, [facility] Triage/Med Refill 11/24/2020     Requested Prescriptions   Pending Prescriptions Disp Refills     VENTOLIN HFA 90 mcg/actuation inhaler [Pharmacy Med Name: Ventolin  (90 Base) MCG/ACT Inhalation Aerosol Solution] 18 g 0     Sig: INHALE 2 PUFFS BY MOUTH EVERY 6 HOURS AS NEEDED FOR WHEEZING OR SHORTNESS OF BREATH       Albuterol/Levalbuterol Refill Protocol Passed - 11/23/2020 10:59 AM        Passed - PCP or prescribing provider visit in last year     Last office visit with prescriber/PCP: 10/8/2020 Amirah Moran MD OR same dept: 10/8/2020 Amirah Moran MD OR same specialty: 10/8/2020 Amirah Moran MD Last physical: 8/13/2020       Next appt within 3 mo: Visit date not found  Next physical within 3 mo: Visit date not found  Prescriber OR PCP: Amirah Moran MD  Last diagnosis associated with med order: 1. Mild intermittent asthma without complication  - VENTOLIN HFA 90 mcg/actuation inhaler [Pharmacy Med Name: Ventolin  (90 Base) MCG/ACT Inhalation Aerosol Solution]; INHALE 2 PUFFS BY MOUTH EVERY 6 HOURS AS NEEDED FOR WHEEZING OR SHORTNESS OF BREATH  Dispense: 18 g; Refill: 0    If protocol passes may refill for 6 months if within 3 months of last provider visit (or a total of 9 months). If patient requesting >1 inhaler per month refill x 6 months and have patient make appointment with provider.

## 2021-06-13 NOTE — PROGRESS NOTES
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  Current on >5 years hormone replacement therapy for hot flashes  -     Ambulatory referral to Gynecologic Oncology    HSV infection of the back, recurrent  Continue with acyclovir    Orders Placed This Encounter   Procedures     Wet Prep, Vaginal     Ambulatory referral to Gynecologic Oncology     Referral Priority:   Routine     Referral Type:   Consultation     Referral Reason:   Specialty Services Required     Requested Specialty:   Oncology     Number of Visits Requested:   1       CHIEF COMPLAINT:  Chief Complaint   Patient presents with     vaginal dischage     \"feels like uterus is prolapse\", see triage note       HISTORY OF PRESENT ILLNESS:  Nicole is a 72 y.o. female presenting to the clinic today for vaginal concerns. A few weeks ago, she felt pressure in the vaginal area. She does feels like there is a protrusion from the vaginal canal. She has noticed some discharge and blood from the vaginal canal. She protrusion is close to the vaginal opening. She has not tried to reduce the protrusion back in. She is most menopausal, with last period when she was in her 40s. She has had some intermittent spotting since menopause. She does continue with her progesterone and estradiol for significant hot flashes. She has been taking hormone replacement therapy for over 5 years. She has been unable to successfully wean off the hormones because of the recurrence of hot flashes. She is not sexually active.     Herpes: She has a history of HSV type 2. She has a recurring outbreak under her back bra strap. She is having some burning above her anal area, and is wondering if she is having an out break there. She has had several days of burning. She did start to take acyclovir yesterday.     REVIEW OF SYSTEMS:   Constitutional: Negative.   HENT: Negative.   Eyes: Negative.   Respiratory: Negative. "   Cardiovascular: Negative.   Gastrointestinal: Negative.   Endocrine: Negative.   Musculoskeletal: Negative.   Allergic/Immunologic: Negative.   Neurological: Negative.   Hematological: Negative.   Psychiatric/Behavioral: Negative.   All other systems are negative.    PFSH:  No new history.     TOBACCO USE:  History   Smoking Status     Former Smoker   Smokeless Tobacco     Never Used     Comment: only in college       VITALS:  Vitals:    10/24/17 1433   BP: 148/74   Pulse: 92   Temp: 97.7  F (36.5  C)   TempSrc: Oral   Weight: 135 lb 9 oz (61.5 kg)     Wt Readings from Last 3 Encounters:   10/24/17 135 lb 9 oz (61.5 kg)   07/31/17 133 lb 7 oz (60.5 kg)   06/29/17 135 lb 1.6 oz (61.3 kg)       PHYSICAL EXAM:  Constitutional: Patient is oriented to person, place, and time. Patient appears well-developed and well-nourished. No distress.   Cardiovascular: Normal rate.  Pulmonary/Chest: Effort normal. No respiratory distress.   Neurological: Patient is alert and oriented to person, place, and time.  Skin: Skin is warm and dry. Patient is not diaphoretic. A cluster of vesicular lesion on erythematous base, very mild. Located under back bra strap.   Pelvic:  Mucosal irritation at introital opening and urethra.  Normal cervix with normal mucosa and without CMT.  No adnexal masses.        Results for orders placed or performed in visit on 05/31/17   Immunoglobulins IgG, IgA, IgM   Result Value Ref Range    Immunoglobulin G 978 700 - 1700 mg/dL    Immunoglobulin M 13 (L) 60 - 280 mg/dL    Immunoglobulin A 182 65 - 400 mg/dL   Lipid Cascade   Result Value Ref Range    Cholesterol 224 (H) <=199 mg/dL    Triglycerides 61 <=149 mg/dL    HDL Cholesterol 64 >=50 mg/dL    LDL Calculated 148 (H) <=129 mg/dL    Patient Fasting > 8hrs? No    Vitamin D, Total (25-Hydroxy)   Result Value Ref Range    Vitamin D, Total (25-Hydroxy) 76.5 30.0 - 80.0 ng/mL   Comprehensive Metabolic Panel   Result Value Ref Range    Sodium 141 136 - 145  mmol/L    Potassium 4.4 3.5 - 5.0 mmol/L    Chloride 106 98 - 107 mmol/L    CO2 25 22 - 31 mmol/L    Anion Gap, Calculation 10 5 - 18 mmol/L    Glucose 90 70 - 125 mg/dL    BUN 14 8 - 28 mg/dL    Creatinine 1.00 0.60 - 1.10 mg/dL    GFR MDRD Af Amer >60 >60 mL/min/1.73m2    GFR MDRD Non Af Amer 55 (L) >60 mL/min/1.73m2    Bilirubin, Total 0.4 0.0 - 1.0 mg/dL    Calcium 9.6 8.5 - 10.5 mg/dL    Protein, Total 7.0 6.0 - 8.0 g/dL    Albumin 4.4 3.5 - 5.0 g/dL    Alkaline Phosphatase 55 45 - 120 U/L    AST 23 0 - 40 U/L    ALT 16 0 - 45 U/L           ADDITIONAL HISTORY SUMMARIZED (2): Reviewed RN triage note from 10/24/2017.  DECISION TO OBTAIN EXTRA INFORMATION (1): None.   RADIOLOGY TESTS (1): None.  LABS (1): Ordered labs today.  MEDICINE TESTS (1): None.  INDEPENDENT REVIEW (2 each): None.     The visit lasted a total of 18 minutes face to face with the patient. Over 50% of the time was spent counseling and educating the patient about vaginal discharge etiologies.    I, Sonal Sanders, am scribing for and in the presence of, Dr. Zavala.    I, Israel Osorio MD , personally performed the services described in this documentation, as scribed by Sonal Sanders in my presence, and it is both accurate and complete.    MEDICATIONS:  Current Outpatient Prescriptions   Medication Sig Dispense Refill     acyclovir (ZOVIRAX) 200 MG capsule Take 1 capsule by mouth every 4 hours while awake for 7 days as needed for cold sores 30 capsule 5     amLODIPine (NORVASC) 5 MG tablet TAKE ONE TABLET BY MOUTH EVERY DAY 90 tablet 3     busPIRone (BUSPAR) 7.5 MG tablet Take 0.5 tablets (3.75 mg total) by mouth 2 (two) times a day. 90 tablet 3     celecoxib (CELEBREX) 100 MG capsule Take 1 capsule (100 mg total) by mouth 2 (two) times a day. 180 capsule 2     cholecalciferol, vitamin D3, 1,000 unit tablet Take 2,000 Units by mouth daily.        diazePAM (VALIUM) 5 MG tablet Take 1 tablet (5 mg total) by mouth  every 8 (eight) hours as needed for muscle spasms. 20 tablet 0     estradiol-norethindrone (ACTIVELLA) 1-0.5 mg per tablet Take 1 tablet by mouth daily. 90 tablet 3     gabapentin (NEURONTIN) 300 MG capsule TAKE THREE CAPSULES BY MOUTH THREE TIMES A  capsule 11     loratadine (CLARITIN) 10 mg tablet Take 10 mg by mouth daily.       ranitidine (ZANTAC) 300 MG tablet TAKE ONE TABLET BY MOUTH TWICE A DAY AS DIRECTED 180 tablet 3     albuterol (PROAIR HFA;PROVENTIL HFA;VENTOLIN HFA) 90 mcg/actuation inhaler Inhale 2 puffs every 6 (six) hours as needed for wheezing or shortness of breath. 1 Inhaler 5     cyclobenzaprine (FLEXERIL) 5 MG tablet Take 1 tablet (5 mg total) by mouth 3 (three) times a day as needed for muscle spasms. 30 tablet 0     desvenlafaxine succinate (PRISTIQ) 50 MG 24 hr tablet Take 1 tablet (50 mg total) by mouth daily. 14 tablet 3     DULoxetine (CYMBALTA) 20 MG capsule Take 1 capsule (20 mg total) by mouth daily. 30 capsule 2     No current facility-administered medications for this visit.        Total data points: 3

## 2021-06-14 NOTE — PROGRESS NOTES
Assessment/Plan:     1. HSV infection     2. GERD (gastroesophageal reflux disease)  ranitidine (ZANTAC) 300 MG tablet   3. Major depression, chronic     4. Anxiety         Diagnoses and all orders for this visit:    HSV infection  -Her skin blisters are consistent with H SV infection.  Provided prescription for valacyclovir 1000 mg to take 3 times daily for 7 days.  Provided refill of previous prescription of valacyclovir 500 mg tablets to take as needed for outbreaks in the future.    GERD (gastroesophageal reflux disease)  -     ranitidine (ZANTAC) 300 MG tablet; TAKE ONE TABLET BY MOUTH TWICE A DAY AS DIRECTED  Dispense: 180 tablet; Refill: 3    Major depression, chronic  - discussed that we have the DNA cheek swab test now available.  She is interested in this.  This was performed today.  Will notify her of results when available.  She seems to be getting some relief on the buspirone.  Anxiety  -Will increase to 10.75 mg twice daily. Prescription sent to pharmacy.    Other orders  -     busPIRone (BUSPAR) 7.5 MG tablet; Take 1.5 tablets (11.25 mg total) by mouth 2 (two) times a day.  Dispense: 270 tablet; Refill: 3  -     valACYclovir (VALTREX) 1000 MG tablet; Take 1 tablet (1,000 mg total) by mouth 3 (three) times a day for 7 days.  Dispense: 21 tablet; Refill: 0  -     valACYclovir (VALTREX) 500 MG tablet; Take 1 tab by mouth twice daily for 3 days and then once daily after that as needed for outbreak of lesions  Dispense: 90 tablet; Refill: 1             Subjective:      Nicole Marcial is a 72 y.o. female follow-up on medications as well as who comes in today to check skin blisters on her back.  She has a history of herpes simplex infection.  She has a prescription for acyclovir to take as needed for outbreaks.  She developed an outbreak of painful lesions in the mid back area.  Started taking acyclovir.  Those are improving.  Developed her anus.  Found some old valacyclovir that she had at home and  started taking that.  This prescription was about 2 years old.  Felt that it was working better than the acyclovir so comes in requesting a new prescription.  However in the past when she has taken valacyclovir, she has had difficulties with sleep.  She was recently diagnosed with some urethral polyps and is planning to see a urologist for this when she goes down to [place] for the winter.  She is nervous about finding someone down there.  Also nervous about being away for so long.  She continues to have problems with chronic lower back pain.  She did recently receive an SI joint injection and admits that that did provide her some relief.  She was recently reading online that Celebrex can be associated with causing painful cold sores and blisters so discontinued the Celebrex.  She would like to know if this side effect is true because she has not noticed an improvement in the skin blisters since she discontinued it and she has noticed more stiffness and pain in several of her joints.  She would like to go back on the medication.  She reports that she has self titrated her buspirone up to 1.5 tablets in the morning and in the afternoon.  She is tolerating this medication and she does  to think it is helpful for her anxiety.  We do prescribe that.  We reviewed medications and allergies.  No other concerns today.    Current Outpatient Prescriptions   Medication Sig Dispense Refill     amLODIPine (NORVASC) 5 MG tablet TAKE ONE TABLET BY MOUTH EVERY DAY 90 tablet 3     busPIRone (BUSPAR) 7.5 MG tablet Take 1.5 tablets (11.25 mg total) by mouth 2 (two) times a day. 270 tablet 3     cholecalciferol, vitamin D3, 1,000 unit tablet Take 2,000 Units by mouth daily.        diazePAM (VALIUM) 5 MG tablet Take 1 tablet (5 mg total) by mouth every 8 (eight) hours as needed for muscle spasms. 20 tablet 0     estradiol-norethindrone (ACTIVELLA) 1-0.5 mg per tablet Take 1 tablet by mouth daily. 90 tablet 3     gabapentin (NEURONTIN)  300 MG capsule TAKE THREE CAPSULES BY MOUTH THREE TIMES A  capsule 11     loratadine (CLARITIN) 10 mg tablet Take 10 mg by mouth daily.       ranitidine (ZANTAC) 300 MG tablet TAKE ONE TABLET BY MOUTH TWICE A DAY AS DIRECTED 180 tablet 3     acyclovir (ZOVIRAX) 200 MG capsule Take 1 capsule (200 mg total) by mouth every 4 (four) hours while awake. for 7 days as needed for cold sores 30 capsule 5     albuterol (PROAIR HFA;PROVENTIL HFA;VENTOLIN HFA) 90 mcg/actuation inhaler Inhale 2 puffs every 6 (six) hours as needed for wheezing or shortness of breath. 1 Inhaler 5     celecoxib (CELEBREX) 100 MG capsule Take 1 capsule (100 mg total) by mouth 2 (two) times a day. 180 capsule 2     cyclobenzaprine (FLEXERIL) 5 MG tablet Take 1 tablet (5 mg total) by mouth 3 (three) times a day as needed for muscle spasms. 30 tablet 0     desvenlafaxine succinate (PRISTIQ) 50 MG 24 hr tablet Take 1 tablet (50 mg total) by mouth daily. 14 tablet 3     DULoxetine (CYMBALTA) 20 MG capsule Take 1 capsule (20 mg total) by mouth daily. 30 capsule 2     valACYclovir (VALTREX) 1000 MG tablet Take 1 tablet (1,000 mg total) by mouth 3 (three) times a day for 7 days. 21 tablet 0     valACYclovir (VALTREX) 500 MG tablet Take 1 tab by mouth twice daily for 3 days and then once daily after that as needed for outbreak of lesions 90 tablet 1     No current facility-administered medications for this visit.        Past Medical History, Family History, and Social History reviewed.  Past Medical History:   Diagnosis Date     Back pain      Chronic pain      Eczema      Fibromyalgia      Hypertension      Mild asthma      Nerve pain      Osteoporosis      Past Surgical History:   Procedure Laterality Date     BACK SURGERY      x3     BUNIONECTOMY       parathyroidectomy N/A 08/16/2016    Dr. Godoy     SHOULDER SURGERY       SINUS SURGERY       TONSILLECTOMY       TRIGGER FINGER RELEASE       Adhesive; Amitriptyline; Aspirin (tartrazine only);  Cefuroxime axetil; Cephalosporins; Chloral hydrate analogues; Chlorhexidine gluconate; Clindamycin; Codeine; Dairy; Doxepin; Ergot alkaloids; Erythromycin base; Gluten; Hydroxyzine; Latex; Morphine; Nickel; Nortriptyline; Pregabalin; Pseudoephedrine; Terconazole; Tramadol; Venlafaxine; Venom-honey bee; Vistaril [hydroxyzine hcl]; and Metronidazole  Family History   Problem Relation Age of Onset     No Medical Problems Mother      Heart disease Father      Sleep apnea Sister      Snoring Sister      Heart disease Sister      No Medical Problems Daughter      No Medical Problems Maternal Grandmother      No Medical Problems Maternal Grandfather      No Medical Problems Paternal Grandmother      No Medical Problems Paternal Grandfather      No Medical Problems Maternal Aunt      No Medical Problems Paternal Aunt      BRCA 1/2 Neg Hx      Breast cancer Neg Hx      Cancer Neg Hx      Colon cancer Neg Hx      Endometrial cancer Neg Hx      Ovarian cancer Neg Hx      Social History     Social History     Marital status:      Spouse name: N/A     Number of children: N/A     Years of education: N/A     Occupational History     Not on file.     Social History Main Topics     Smoking status: Former Smoker     Smokeless tobacco: Never Used      Comment: only in college     Alcohol use Yes      Comment: seldom     Drug use: No     Sexual activity: Not Currently     Other Topics Concern     Not on file     Social History Narrative         Review of systems is as stated in HPI, and the remainder of the 10 system review is otherwise negative.    Objective:     Vitals:    11/17/17 1546   BP: 132/68   Patient Site: Left Arm   Patient Position: Sitting   Cuff Size: Adult Regular   Pulse: 97   Temp: 99.1  F (37.3  C)   TempSrc: Tympanic   SpO2: 98%   Weight: 134 lb (60.8 kg)    Body mass index is 24.51 kg/(m^2).    General appearance: alert, appears stated age and cooperative  Head: Normocephalic, without obvious abnormality,  atraumatic  Skin: Grouping of a few vesicular lesions present mid back.  Larger grouping of vesicular lesions on erythematous skin base present around the anus        This note has been dictated using voice recognition software. Any grammatical or context distortions are unintentional and inherent to the the software.

## 2021-06-16 ENCOUNTER — COMMUNICATION - HEALTHEAST (OUTPATIENT)
Dept: FAMILY MEDICINE | Facility: CLINIC | Age: 76
End: 2021-06-16

## 2021-06-16 DIAGNOSIS — I10 ESSENTIAL HYPERTENSION: ICD-10-CM

## 2021-06-16 PROBLEM — F41.9 ANXIETY: Status: ACTIVE | Noted: 2018-11-14

## 2021-06-16 PROBLEM — B00.1 RECURRENT COLD SORES: Status: ACTIVE | Noted: 2018-11-14

## 2021-06-16 PROBLEM — F51.01 PRIMARY INSOMNIA: Status: ACTIVE | Noted: 2018-11-14

## 2021-06-16 PROBLEM — N18.30 CHRONIC KIDNEY DISEASE, STAGE 3 (H): Status: ACTIVE | Noted: 2021-05-10

## 2021-06-16 PROBLEM — J45.20 MILD INTERMITTENT ASTHMA WITHOUT COMPLICATION: Status: ACTIVE | Noted: 2020-08-13

## 2021-06-17 ENCOUNTER — COMMUNICATION - HEALTHEAST (OUTPATIENT)
Dept: FAMILY MEDICINE | Facility: CLINIC | Age: 76
End: 2021-06-17

## 2021-06-17 RX ORDER — AMLODIPINE BESYLATE 5 MG/1
TABLET ORAL
Qty: 90 TABLET | Refills: 3 | Status: SHIPPED | OUTPATIENT
Start: 2021-06-17 | End: 2022-05-09

## 2021-06-17 NOTE — PROGRESS NOTES
"    Assessment & Plan     Nicole was seen today for f/u on kidney function.    Diagnoses and all orders for this visit:    Stage 3 chronic kidney disease, unspecified whether stage 3a or 3b CKD  -     Basic Metabolic Panel  -Blood pressure is controlled    Current long-term use of postmenopausal hormone replacement therapy  -     estradiol-norethindrone (ACTIVELLA) 1-0.5 mg per tablet; Take 1 tablet by mouth daily.    Polyarthralgia  -     HM1(CBC and Differential)  -     CCP Antibodies  -     Rheumatoid Factor Quant  -     C-Reactive Protein(CRP)  -     Sedimentation Rate  -     Vitamin D, Total (25-Hydroxy)  -Can consider referral to rheumatology.  She will wait to see you results of above labs and decide if she would like to pursue this referral    Mixed hyperlipidemia  -     Lipid [place] FASTING  -Not currently on lipid-lowering medication.  Encouraged regular exercise and healthy diet    Age-related osteoporosis without current pathological fracture  -     Vitamin D, Total (25-Hydroxy)  -We will check vitamin D with labs.  Continue to get regular weightbearing exercise and adequate calcium and vitamin D.  Encouraged her to schedule a follow-up appointment with her osteoporosis specialist    Selective deficiency of IgG (H)  -     Immunoglobulins IgG, IgA, IgM  -She gets labs monitored on a yearly basis.  We will draw these today                 BMI:   Estimated body mass index is 27.04 kg/m  as calculated from the following:    Height as of 8/13/20: 5' 1\" (1.549 m).    Weight as of this encounter: 143 lb 2 oz (64.9 kg).   The following high BMI interventions were performed this visit: encouragement to exercise    Return in about 6 months (around 11/10/2021) for Annual physical.    Amirah Moran MD  [facility]   Nicole Marcial is 76 y.o. and presents today for the following health issues   HPI   She comes in today for medication check and follow-up on kidney function.  " She spent the winter down in [place].  Spends about 6 months of the year in [place] in 6 months of the year in [place].  She was last seen in October.  She reports no significant changes in her health over the last 6 months or so.  We reviewed and updated her medications and allergies.  For the most part she has been doing quite well.  She did start eating gluten again and has not been bothered by any GI side effects.  She complains of multiple joint pains in her ankles knees and wrists.  She is not sure if she should be seen by rheumatology.  Sometimes the joints of her fingers are swollen.  She has some occasional swelling in her lower extremities.  She is sleeping well with use of amitriptyline.  She requests refill of Activella which she takes for postmenopausal symptoms and osteoporosis.  She has not seen her osteoporosis specialist in about 3 years.  Hypertension is controlled with amlodipine.  She has underlying anxiety.  States that overall her anxiety is manageable.  Has been under some stress as she found a mice infestation in her home when she returned from [place] and is dealing with that.  Also indicates that the political environment is stressful for her as well.  She did receive the COVID-19 vaccination.  Review of systems is otherwise negative.  No other questions today.          Review of Systems   All other systems reviewed and are negative.          Objective    /70 (Patient Site: Right Arm, Patient Position: Sitting, Cuff Size: Adult Large)   Pulse 89   Temp 99.4  F (37.4  C) (Temporal)   Wt 143 lb 2 oz (64.9 kg)   SpO2 98%   BMI 27.04 kg/m    Body mass index is 27.04 kg/m .  Physical Exam   Constitutional: She is oriented to person, place, and time. She appears well-developed and well-nourished.   Cardiovascular: Normal rate, regular rhythm and normal heart sounds.   Pulmonary/Chest: Effort normal and breath sounds normal. No respiratory distress. She has no wheezes.    Musculoskeletal:         General: No edema.   Neurological: She is alert and oriented to person, place, and time.   Psychiatric: She has a normal mood and affect. Her behavior is normal.

## 2021-06-17 NOTE — TELEPHONE ENCOUNTER
[facility] PA team  [phone]  Pool: [facility] PA MED ([id number])          PA has been initiated.       PA form completed and faxed insurance via Cover My Meds     Key:  SATISH FOX Case ID: [id number] - Rx #: [id number]     Medication:  Estradiol-Norethindrone Acet 1-0.5MG tablets    Insurance:  [employer]         Response will be received via fax and may take up to 5-10 business days depending on plan

## 2021-06-17 NOTE — PROGRESS NOTES
Assessment/Plan:     1. CKD (chronic kidney disease) stage 3, GFR 30-59 ml/min     2. Neuropathic pain  gabapentin (NEURONTIN) 300 MG capsule   3. Essential hypertension  amLODIPine (NORVASC) 5 MG tablet    Comprehensive Metabolic Panel    Lipid Cascade RANDOM   4. Osteoporosis  Vitamin D, Total (25-Hydroxy)   5. Selective IgG2 deficiency  Immunoglobulins IgG, IgA, IgM   6. Lumbago     7. Urethral polyp     8. Lumbar radiculopathy     9. Anxiety     10. Major depression, chronic         Diagnoses and all orders for this visit:    CKD (chronic kidney disease) stage 3, GFR 30-59 ml/min  -We will recheck her basic metabolic panel today will need to consider whether it is appropriate to continue Celebrex or discontinued based on the results of her renal function test    Neuropathic pain  -     gabapentin (NEURONTIN) 300 MG capsule; TAKE THREE CAPSULES BY MOUTH THREE TIMES A DAY  Dispense: 270 capsule; Refill: 11  -She is currently taking 300 mg 3 times daily.  Previously took 900 mg 3 times daily.  She would like to go back to previous dose but would like to check her renal function first    Essential hypertension  -     amLODIPine (NORVASC) 5 MG tablet; TAKE ONE TABLET BY MOUTH EVERY DAY  Dispense: 90 tablet; Refill: 3  -     Comprehensive Metabolic Panel  -     Lipid Cascade RANDOM  -Blood pressure controlled with current medication    Osteoporosis  -     Vitamin D, Total (25-Hydroxy)    Selective IgG2 deficiency  -     Immunoglobulins IgG, IgA, IgM    Lumbago  Continue Celebrex and gabapentin    Urethral polyp  She will follow-up with urologist    Lumbar radiculopathy  Continue Celebrex and gabapentin    Anxiety  Continue buspirone    Major depression, chronic  Does not want to trial Fetzima at this time    Other orders  -     estradiol-norethindrone (ACTIVELLA) 1-0.5 mg per tablet; Take 1 tablet by mouth daily.  Dispense: 90 tablet; Refill: 3  -     albuterol (PROAIR HFA;PROVENTIL HFA;VENTOLIN HFA) 90  mcg/actuation inhaler; Inhale 2 puffs every 6 (six) hours as needed for wheezing or shortness of breath.  Dispense: 1 Inhaler; Refill: 5    Tetanus booster administered today.  Counseled on use of medication and side effects           Subjective:      Nicole Marcial is a 73 y.o. female who comes in today for follow-up.  She recently returned from [place].  Was down there for several months.  States that while in [place] her pain was 50% improved.  She felt really good.  She was participating in exercise and dancing to find music doing [employer] and other activities.  Also enjoyed the social aspects of being in [place].  They are in the process of building a house down there so that they can spend their hdz down there but they will continue to return to [place] for the spring and fall.  Just before she left for [place] she was seen by her gynecologist due to some spotting on her underwear.  Was diagnosed with urethral polyps and advised to see a urologist.  She saw a urologist in [place].  She had a CT scan performed that showed some kidney stones.  She also had a cystoscopy that was unremarkable.  She was advised to do female physical therapy.  Was also advised to try topical estrogen cream.  She had previously tried topical estrogen cream and did not respond well to it.  She did try it again but it caused her to have swelling and irritation so she stopped using it after about 2 weeks.  She does have an appointment to follow-up with her urologist to [place] next week.  She is no longer having any spotting on her underwear so she is not sure if the urethral polyps have resolved.  Her kidney function was checked while in [place] and her creatinine was slightly elevated.  She was advised to follow-up on this with her primary care provider.  She was taken Celebrex for her chronic lower back pain and lumbar radiculopathy.  She was also taking gabapentin.  Was taken 900 mg of gabapentin 3 times daily but decrease  the dosage to 300 mg 3 times daily once she saw the results of her kidney function.  She has not noticed much of a difference but she has had more numbness with the lower dose.  She also reduce the dose of Celebrex down to 1 tablet daily and then tried stopping it altogether but found that her pain was not tolerable so she restarted the Celebrex.  She is currently taking it twice daily.  It does not cause her stomach irritation.  She does have underlying reflux.  Tried weaning off of ranitidine but was not able to do so and continues to take 300 mg twice daily.  She was having some increased anxiety and depressive symptoms prior to traveling to [place].  We had done gene site testing.  Results showed that Pristiq or Fetzima would be possible treatment options.  She has taken Pristiq in the past but was not able to sleep with this medication.  She is not sure she wants to try the Fetzima.  Currently she feels that the buspirone seems to be helpful for her anxiety.  She is currently taking 11.25 mg twice daily.  We reviewed her medications and allergies.  She did have some increased asthma symptoms while in [place] around [employer].  Not currently experiencing wheezing, chest tightness or increased dyspnea.  No other questions today.    Current Outpatient Prescriptions   Medication Sig Dispense Refill     albuterol (PROAIR HFA;PROVENTIL HFA;VENTOLIN HFA) 90 mcg/actuation inhaler Inhale 2 puffs every 6 (six) hours as needed for wheezing or shortness of breath. 1 Inhaler 5     amLODIPine (NORVASC) 5 MG tablet TAKE ONE TABLET BY MOUTH EVERY DAY 90 tablet 3     busPIRone (BUSPAR) 7.5 MG tablet Take 1.5 tablets (11.25 mg total) by mouth 2 (two) times a day. 270 tablet 3     celecoxib (CELEBREX) 100 MG capsule Take 1 capsule (100 mg total) by mouth 2 (two) times a day. 180 capsule 2     cholecalciferol, vitamin D3, 1,000 unit tablet Take 2,000 Units by mouth daily.        estradiol-norethindrone (ACTIVELLA) 1-0.5  mg per tablet Take 1 tablet by mouth daily. 90 tablet 3     gabapentin (NEURONTIN) 300 MG capsule TAKE THREE CAPSULES BY MOUTH THREE TIMES A  capsule 11     loratadine (CLARITIN) 10 mg tablet Take 10 mg by mouth daily.       ranitidine (ZANTAC) 300 MG tablet TAKE ONE TABLET BY MOUTH TWICE A DAY AS DIRECTED 180 tablet 3     acyclovir (ZOVIRAX) 200 MG capsule Take 1 capsule (200 mg total) by mouth every 4 (four) hours while awake. for 7 days as needed for cold sores 30 capsule 5     cyclobenzaprine (FLEXERIL) 5 MG tablet Take 1 tablet (5 mg total) by mouth 3 (three) times a day as needed for muscle spasms. 30 tablet 0     diazePAM (VALIUM) 5 MG tablet Take 1 tablet (5 mg total) by mouth every 8 (eight) hours as needed for muscle spasms. 20 tablet 0     valACYclovir (VALTREX) 500 MG tablet Take 1 tab by mouth twice daily for 3 days and then once daily after that as needed for outbreak of lesions 90 tablet 1     No current facility-administered medications for this visit.        Past Medical History, Family History, and Social History reviewed.  Past Medical History:   Diagnosis Date     Back pain      Chronic pain      Eczema      Fibromyalgia      Hypertension      Mild asthma      Nerve pain      Osteoporosis      Past Surgical History:   Procedure Laterality Date     BACK SURGERY      x3     BUNIONECTOMY       parathyroidectomy N/A 08/16/2016    Dr. Godoy     SHOULDER SURGERY       SINUS SURGERY       TONSILLECTOMY       TRIGGER FINGER RELEASE       Adhesive; Amitriptyline; Aspirin (tartrazine only); Cefuroxime axetil; Cephalosporins; Chloral hydrate analogues; Chlorhexidine gluconate; Clindamycin; Codeine; Dairy; Doxepin; Ergot alkaloids; Erythromycin base; Gluten; Hydroxyzine; Latex; Morphine; Nickel; Nortriptyline; Pregabalin; Pseudoephedrine; Terconazole; Tramadol; Venlafaxine; Venom-honey bee; Vistaril [hydroxyzine hcl]; and Metronidazole  Family History   Problem Relation Age of Onset     No Medical  Problems Mother      Heart disease Father      Sleep apnea Sister      Snoring Sister      Heart disease Sister      No Medical Problems Daughter      No Medical Problems Maternal Grandmother      No Medical Problems Maternal Grandfather      No Medical Problems Paternal Grandmother      No Medical Problems Paternal Grandfather      No Medical Problems Maternal Aunt      No Medical Problems Paternal Aunt      BRCA 1/2 Neg Hx      Breast cancer Neg Hx      Cancer Neg Hx      Colon cancer Neg Hx      Endometrial cancer Neg Hx      Ovarian cancer Neg Hx      Social History     Social History     Marital status:      Spouse name: N/A     Number of children: N/A     Years of education: N/A     Occupational History     Not on file.     Social History Main Topics     Smoking status: Former Smoker     Smokeless tobacco: Never Used      Comment: only in college     Alcohol use Yes      Comment: seldom     Drug use: No     Sexual activity: Not Currently     Other Topics Concern     Not on file     Social History Narrative         Review of systems is as stated in HPI, and the remainder of the 10 system review is otherwise negative.    Objective:     Vitals:    04/11/18 0947   BP: 119/60   Patient Site: Right Arm   Patient Position: Sitting   Cuff Size: Adult Regular   Pulse: 82   Temp: 98.5  F (36.9  C)   TempSrc: Tympanic   SpO2: 98%   Weight: 130 lb 3 oz (59.1 kg)    Body mass index is 23.81 kg/(m^2).    General appearance: alert, appears stated age and cooperative  Lungs: clear to auscultation bilaterally  Heart: regular rate and rhythm, S1, S2 normal, no murmur, click, rub or gallop  Psych: mood appropriate, affect normal        This note has been dictated using voice recognition software. Any grammatical or context distortions are unintentional and inherent to the the software.

## 2021-06-17 NOTE — TELEPHONE ENCOUNTER
Prior Authorization Request  Who s requesting:  Pharmacy  Pharmacy Name and Location: [facility]  Medication Name: Estradiol- Norethindrone Acet. 1-0.5 mg tablets  Insurance Plan: Medicare and [employer]  Insurance Member ID Number:    CoverMyMeds Key: [id number]  Informed patient that prior authorizations can take up to 10 business days for response:   Yes  Okay to leave a detailed message: No

## 2021-06-17 NOTE — TELEPHONE ENCOUNTER
Telephone Encounter by Ute Jimenez at 5/18/2021 11:32 AM     Author: Ute Jimenez Service: -- Author Type: --    Filed: 5/18/2021 11:33 AM Encounter Date: 5/12/2021 Status: Signed    : Ute Jimenez APPROVED:    Approval start date: 04/14/2021  Approval end date:  05/14/2022    Pharmacy has been notified of approval and will contact patient when medication is ready for pickup.

## 2021-06-17 NOTE — PATIENT INSTRUCTIONS - HE
Patient Instructions by mAirah oMran MD at 5/20/2019  9:20 AM     Author: Amirah Moran MD Service: -- Author Type: Physician    Filed: 5/20/2019  9:51 AM Encounter Date: 5/20/2019 Status: Addendum    : Amirah Moran MD (Physician)    Related Notes: Original Note by Amirah Moran MD (Physician) filed at 5/20/2019  9:45 AM       Consider PT if not improving with exercises at home    Can try brace found on [employer] website          Patient Education     Ankle Dorsiflexion/Plantarflexion (Flexibility)    1. Sit on the floor or in bed with your legs straight in front of you.  2. Point both feet. Then flex both feet.  3. Do this 10 to 30 times in a row.  4. Repeat this exercise 2 times a day, or as instructed.  Date Last Reviewed: 5/1/2016 2000-2017 [employer]. [address] [place] [address]. All rights reserved. This information is not intended as a substitute for professional medical care. Always follow your healthcare professional's instructions.           Patient Education     Treating Plantar Fasciitis  First, your healthcare provider tries to find out the cause of your problem. He or she can then suggest ways to ease pain. If your pain is due to poor foot mechanics, occasionally custom-made shoe inserts (orthoses) may help.    Ease symptoms    To relieve mild symptoms, try aspirin, ibuprofen, or other medicines as directed. Rubbing ice on the area may also help.    To lessen severe pain and swelling, your healthcare provider may give you pills or injections. In some cases, you may need a walking cast. Physical therapy, such as ultrasound or a daily stretching program, may also be recommended. Surgery is rarely needed.    To ease symptoms caused by poor foot mechanics, your foot may be taped. This supports the arch and temporarily controls movement. Night splints may also help by stretching the fascia.  Control movement  If taping helps, your healthcare provider may  prescribe orthoses. These are inserts built from plaster casts of your feet. They control the way your foot moves. As a result, your symptoms may go away.  Reduce overuse  Every time your foot strikes the ground, the plantar fascia is stretched. You can lessen the strain on the plantar fascia and the chance of overuse by:    Losing any excess weight    Not running on hard or uneven ground    Using orthoses, if recommended, in your shoes and house slippers  If surgery is needed  Your healthcare provider may consider surgery if other types of treatment don't control your pain. During surgery, the plantar fascia is partially cut to release tension. As you heal, fibrous tissue fills the space between the heel bone and the plantar fascia.   Date Last Reviewed: 1/1/2018 2000-2017 [employer]. [address] [place] [address], [place] [address]. All rights reserved. This information is not intended as a substitute for professional medical care. Always follow your healthcare professional's instructions.

## 2021-06-19 NOTE — PROGRESS NOTES
"Assessment/Plan:        1. Osteoporosis Senile  Prior Authorization    Provider Instructions    Nursing Instruction    Schedule a 30 minute Infusion Appointment.    zoledronic acid-mannitol-water infusion 5 mg (RECLAST)    Prior Authorization    Provider Instructions    Nursing Instruction    Schedule a 30 minute Infusion Appointment.    Ambulatory referral to Infusion Therapy    CANCELED: Comprehensive Metabolic Panel   2. Hyperparathyroidism (H)  Prior Authorization    Provider Instructions    Nursing Instruction    Schedule a 30 minute Infusion Appointment.    zoledronic acid-mannitol-water infusion 5 mg (RECLAST)    Prior Authorization    Provider Instructions    Nursing Instruction    Schedule a 30 minute Infusion Appointment.    CANCELED: Comprehensive Metabolic Panel       Return in about 1 year (around 8/16/2019) for Recheck.    There are no Patient Instructions on file for this visit.    Chief Complaint   Patient presents with     Follow-up     Patient is here today for follow-up of osteoporosis.  She has had a bone density scan today which showed osteopenia with the lowest T score in her spine of -1.9 and significant improvement of 5% in her spine and stability in her both hips.  She was treated with Prolia for a few years but developed eczema after few shots since the last year we switched her to Reclast.  She tolerated Reclast infusion in 2017 very well and I am going to set her up for the next year infusion.  She had renal function test done in July and it was normal.  She will continue with the calcium and vitamin D supplements.  I will see her for the follow-up in a year.    /73  Ht 5' 2\" (1.575 m)  Wt 131 lb 6.4 oz (59.6 kg)  BMI 24.03 kg/m2  15 minutes spent with the patient and more then 50 % of the time in counseling.  This note has been dictated using voice recognition software. Any grammatical or context distortions are unintentional and inherent to the software      Patient Active " Problem List   Diagnosis     Hypertension     Lower Back Pain     Herpes Simplex Type I     Esophageal Reflux     Renal Cyst     Fibromyalgia     Taking Female Hormones For Postmenopausal HRT     Allergic Rhinitis     Mild persistent asthma     Lumbar radiculopathy     Dermatitis     Diarrhea     Serum Enzyme Levels - Lipase Elevated     Osteoporosis Senile     Herniated Disc (L4 - L5) Right     Hyperlipidemia     Selective IgG Deficiency     Arthralgias In Multiple Sites     Temperature Intolerance To Cold   (Consistent)     Chronic Pain     Drug allergy     Guerrero's neuroma of left foot     Hypercalcemia     Hyperparathyroidism (H)       Current Outpatient Prescriptions on File Prior to Visit   Medication Sig Dispense Refill     amLODIPine (NORVASC) 5 MG tablet TAKE ONE TABLET BY MOUTH EVERY DAY 90 tablet 3     busPIRone (BUSPAR) 7.5 MG tablet Take 1.5 tablets (11.25 mg total) by mouth 2 (two) times a day. 270 tablet 3     celecoxib (CELEBREX) 100 MG capsule Take 1 capsule (100 mg total) by mouth 2 (two) times a day. 180 capsule 2     cholecalciferol, vitamin D3, 1,000 unit tablet Take 2,000 Units by mouth daily.        estradiol-norethindrone (ACTIVELLA) 1-0.5 mg per tablet Take 1 tablet by mouth daily. 90 tablet 3     gabapentin (NEURONTIN) 300 MG capsule TAKE THREE CAPSULES BY MOUTH THREE TIMES A  capsule 11     loratadine (CLARITIN) 10 mg tablet Take 10 mg by mouth daily.       ranitidine (ZANTAC) 300 MG tablet TAKE ONE TABLET BY MOUTH TWICE A DAY AS DIRECTED 180 tablet 3     acyclovir (ZOVIRAX) 200 MG capsule Take 1 capsule (200 mg total) by mouth every 4 (four) hours while awake. for 7 days as needed for cold sores 30 capsule 5     albuterol (PROAIR HFA;PROVENTIL HFA;VENTOLIN HFA) 90 mcg/actuation inhaler Inhale 2 puffs every 6 (six) hours as needed for wheezing or shortness of breath. 1 Inhaler 5     [DISCONTINUED] cyclobenzaprine (FLEXERIL) 5 MG tablet Take 1 tablet (5 mg total) by mouth 3 (three)  times a day as needed for muscle spasms. 30 tablet 0     [DISCONTINUED] diazePAM (VALIUM) 5 MG tablet Take 1 tablet (5 mg total) by mouth every 8 (eight) hours as needed for muscle spasms. 20 tablet 0     [DISCONTINUED] valACYclovir (VALTREX) 500 MG tablet Take 1 tab by mouth twice daily for 3 days and then once daily after that as needed for outbreak of lesions 90 tablet 1     No current facility-administered medications on file prior to visit.

## 2021-06-20 NOTE — PROGRESS NOTES
MTM Consult Encounter    Nicole Marcial is a 73 y.o. female referred for a clinical pharmacist consult from Dr. Moran for diarrhea and medications.     Diarrhea:     Patient reports that she has gluten intolerance (not celiac disease) and would like to know if her medications are contributing. Reviewed that I would have to look up whether her medications contain gluten.   Medications that she thinks are contributing:   Valacyclovir ([facility])  Acyclovir (Apotex)  Is wondering about an alternative to valacyclovir and acyclovir since she thinks they have caused diarrhea. Is not interested in topical acyclovir at this time. Has not tried lysine.     See [employer] messages with responses.     Follow up:   Via [employer]    Nemo Dean, Pharm.D., BCACP  Medication Therapy Management Pharmacist  [facility]      Current Outpatient Prescriptions   Medication Sig Dispense Refill     acyclovir (ZOVIRAX) 200 MG capsule Take 1 capsule (200 mg total) by mouth every 4 (four) hours while awake. for 7 days as needed for cold sores 30 capsule 5     albuterol (PROAIR HFA;PROVENTIL HFA;VENTOLIN HFA) 90 mcg/actuation inhaler Inhale 2 puffs every 6 (six) hours as needed for wheezing or shortness of breath. 1 Inhaler 5     amLODIPine (NORVASC) 5 MG tablet TAKE ONE TABLET BY MOUTH EVERY DAY 90 tablet 3     busPIRone (BUSPAR) 7.5 MG tablet Take 1.5 tablets (11.25 mg total) by mouth 2 (two) times a day. 270 tablet 3     celecoxib (CELEBREX) 100 MG capsule Take 1 capsule (100 mg total) by mouth 2 (two) times a day. 180 capsule 2     cholecalciferol, vitamin D3, 1,000 unit tablet Take 2,000 Units by mouth daily.        estradiol-norethindrone (ACTIVELLA) 1-0.5 mg per tablet Take 1 tablet by mouth daily. 90 tablet 3     loratadine (CLARITIN) 10 mg tablet Take 10 mg by mouth daily.       ranitidine (ZANTAC) 300 MG tablet TAKE ONE TABLET BY MOUTH TWICE A DAY AS DIRECTED 180 tablet 3     traZODone (DESYREL) 50 MG tablet Take  1-2 tablets ( mg total) by mouth at bedtime as needed for sleep. 30 tablet 0     No current facility-administered medications for this visit.

## 2021-06-20 NOTE — PROGRESS NOTES
Assessment/Plan:     1. Abdominal pain, generalized  Comprehensive Metabolic Panel    HM2(CBC w/o Differential)   2. Vaginal itching  Ambulatory referral to Dermatology   3. Diarrhea     4. Herpes Simplex Type I         Diagnoses and all orders for this visit:    Abdominal pain, generalized  -     Comprehensive Metabolic Panel  -     HM2(CBC w/o Differential)  -Hemogram is normal.  We will also check comprehensive metabolic panel today.  She has appointment tomorrow to discuss medication side effects with pharmacist.  Will look forward to the recommendations from pharmacist.    Vaginal itching  -     Ambulatory referral to Dermatology    Diarrhea  Presumed to be side effect of medication.  She will meet with pharmacist tomorrow.  Check conference metabolic panel today.  Hemogram is normal.    Herpes Simplex Type I  Has discontinued acyclovir and valacyclovir.  She will meet with pharmacist to discuss side effects of these medications and other treatment options.     Chronic pain   She will discuss gene site testing with pharmacist tomorrow      Subjective:      Nicole Marcial is a 73 y.o. female who comes in today with multiple concerns.  Complains of generalized abdominal pain as well as diarrhea.  States that since her last office visit together in April, she did get authorization to be seen down at [facility] in [place] for consultation regarding management of chronic lower back pain.  States that they were considering some stem cell treatments but were concerned that this may actually trigger growth of the bone which could worsen the underlying issue.  They tried doing some injections but were not able to get her completely numb so she was able to complete these injections.  She is wondering about genetic testing to determine her susceptibility to pain medications.  She has previously done gene site testing for antidepressant medications.  Historically she has had multiple medication sensitivities with  various pain medications or lack of effectiveness.  She does have an appointment tomorrow to see the medication therapy management pharmacist and is planning to discuss this in more detail with the pharmacist.  She is also concerned that her current symptoms of abdominal pain, nausea and diarrhea are potentially due to acyclovir and valacyclovir.  She has history of herpes simplex virus.  States that on August 4 she developed some GI symptoms that were thought to be due to a gluten exposure.  Symptoms improved but then last Thursday she developed herpes type rash above her tailbone and started acyclovir.  Almost immediately she developed severe headache, nausea, diarrhea and abdominal discomfort.  She started researching on the Internet and found that these side effects can occur with acyclovir as well as valacyclovir.  She stopped the medication on August 30 and symptoms have slightly improved.  She still has frequent loose stools and generalized abdominal discomfort.  She does have history of C. difficile diarrhea but states that this is not similar to the type of diarrhea she experienced with C. difficile.  She did have a bout of diarrhea about a year and a half ago and states that diarrhea is similar to what she experienced at that time.  Did undergo stool testing at that time that was negative and also ultimately underwent colonoscopy which was normal.  She was advised to do a fiber supplement and take Imodium.  She is planning to discuss concerns about medication side effects with the pharmacist tomorrow.  Also reports that she is having a lot of vaginal itching.  Had this about 3 years ago.  Biopsy of perineum was performed with no clear diagnosis.  There was a sub-epithelial mast cells raising concern for possible allergic reaction.  Ultimately symptoms improved when she switched to a type of toilet paper that did not contain bleach or formaldehyde.  However with current flareup of symptoms, nothing has  seemed to give her relief.  Tried over-the-counter Monistat treatment.  Has tried washing underwear in baby detergent and has tried switching her toilet paper again but nothing has been effective.  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  States that has not recently been giving her trouble.  Reviewed medications and allergies.  Chart is updated.  No other concerns or questions.    Current Outpatient Prescriptions   Medication Sig Dispense Refill     albuterol (PROAIR HFA;PROVENTIL HFA;VENTOLIN HFA) 90 mcg/actuation inhaler Inhale 2 puffs every 6 (six) hours as needed for wheezing or shortness of breath. 1 Inhaler 5     amLODIPine (NORVASC) 5 MG tablet TAKE ONE TABLET BY MOUTH EVERY DAY 90 tablet 3     busPIRone (BUSPAR) 7.5 MG tablet Take 1.5 tablets (11.25 mg total) by mouth 2 (two) times a day. 270 tablet 3     celecoxib (CELEBREX) 100 MG capsule Take 1 capsule (100 mg total) by mouth 2 (two) times a day. 180 capsule 2     cholecalciferol, vitamin D3, 1,000 unit tablet Take 2,000 Units by mouth daily.        estradiol-norethindrone (ACTIVELLA) 1-0.5 mg per tablet Take 1 tablet by mouth daily. 90 tablet 3     loratadine (CLARITIN) 10 mg tablet Take 10 mg by mouth daily.       ranitidine (ZANTAC) 300 MG tablet TAKE ONE TABLET BY MOUTH TWICE A DAY AS DIRECTED 180 tablet 3     acyclovir (ZOVIRAX) 200 MG capsule Take 1 capsule (200 mg total) by mouth every 4 (four) hours while awake. for 7 days as needed for cold sores 30 capsule 5     No current facility-administered medications for this visit.        Past Medical History, Family History, and Social History reviewed.  Past Medical History:   Diagnosis Date     Back pain      Chronic pain      Eczema      Fibromyalgia      Hypertension      Mild asthma      Nerve pain      Osteoporosis      Past Surgical History:   Procedure Laterality Date      BACK SURGERY      x3     BUNIONECTOMY       parathyroidectomy N/A 08/16/2016    Dr. Godoy     SHOULDER SURGERY       SINUS SURGERY       TONSILLECTOMY       TRIGGER FINGER RELEASE       Adhesive; Amitriptyline; Aspirin (tartrazine only); Cefuroxime axetil; Cephalosporins; Chloral hydrate analogues; Chlorhexidine gluconate; Clindamycin; Codeine; Dairy; Doxepin; Ergot alkaloids; Erythromycin base; Gluten; Hydroxyzine; Latex; Morphine; Nickel; Nortriptyline; Pregabalin; Pseudoephedrine; Terconazole; Tramadol; Venlafaxine; Venom-honey bee; Vistaril [hydroxyzine hcl]; and Metronidazole  Family History   Problem Relation Age of Onset     No Medical Problems Mother      Heart disease Father      Sleep apnea Sister      Snoring Sister      Heart disease Sister      No Medical Problems Daughter      No Medical Problems Maternal Grandmother      No Medical Problems Maternal Grandfather      No Medical Problems Paternal Grandmother      No Medical Problems Paternal Grandfather      No Medical Problems Maternal Aunt      No Medical Problems Paternal Aunt      BRCA 1/2 Neg Hx      Breast cancer Neg Hx      Cancer Neg Hx      Colon cancer Neg Hx      Endometrial cancer Neg Hx      Ovarian cancer Neg Hx      Social History     Social History     Marital status:      Spouse name: N/A     Number of children: N/A     Years of education: N/A     Occupational History     Not on file.     Social History Main Topics     Smoking status: Former Smoker     Smokeless tobacco: Never Used      Comment: only in college     Alcohol use Yes      Comment: seldom     Drug use: No     Sexual activity: Not Currently     Other Topics Concern     Not on file     Social History Narrative         Review of systems is as stated in HPI, and the remainder of the 10 system review is otherwise negative.    Objective:     Vitals:    09/05/18 1053   BP: 136/62   Patient Site: Left Arm   Patient Position: Sitting   Cuff Size: Adult Regular   Pulse: (!)  "101   SpO2: 98%   Weight: 130 lb 9.6 oz (59.2 kg)   Height: 5' 2\" (1.575 m)    Body mass index is 23.89 kg/(m^2).    General appearance: alert, appears stated age and cooperative, appears fatigued and ill  Head: Normocephalic, without obvious abnormality, atraumatic  Neck: no adenopathy  Lungs: clear to auscultation bilaterally  Heart: regular rate and rhythm, S1, S2 normal, no murmur, click, rub or gallop  Abdomen: Soft, bowel sounds present all 4 quadrants, indicates pain on palpation throughout abdomen, no rebound, no guarding, no masses  Extremities: extremities normal, atraumatic, no cyanosis or edema    Recent Results (from the past 24 hour(s))   HM2(CBC w/o Differential)    Collection Time: 09/05/18 11:42 AM   Result Value Ref Range    WBC 9.2 4.0 - 11.0 thou/uL    RBC 4.39 3.80 - 5.40 mill/uL    Hemoglobin 14.0 12.0 - 16.0 g/dL    Hematocrit 41.0 35.0 - 47.0 %    MCV 93 80 - 100 fL    MCH 31.8 27.0 - 34.0 pg    MCHC 34.1 32.0 - 36.0 g/dL    RDW 12.7 11.0 - 14.5 %    Platelets 227 140 - 440 thou/uL    MPV 7.8 7.0 - 10.0 fL         This note has been dictated using voice recognition software. Any grammatical or context distortions are unintentional and inherent to the the software.       "

## 2021-06-21 ENCOUNTER — COMMUNICATION - HEALTHEAST (OUTPATIENT)
Dept: FAMILY MEDICINE | Facility: CLINIC | Age: 76
End: 2021-06-21

## 2021-06-21 DIAGNOSIS — M25.50 POLYARTHRALGIA: ICD-10-CM

## 2021-06-21 NOTE — PROGRESS NOTES
Assessment/Plan:     1. Insomnia     2. Recurrent cold sores         Diagnoses and all orders for this visit:    Insomnia  Discussed options for treatment of insomnia with patient today.  Discussed that if her insomnia is so severe and she is not able to function, the benefits of taking Benadryl likely would outweigh the risks as this historically has been something that worked well to help her sleep.  She will try Benadryl this evening.  If this is not effective, I did provide her with a small number of alprazolam tablets to have on hand to use as a backup in order to help her get some adequate sleep.  She was counseled on use of medication and side effects.  She will notify me of the outcome and whether the the Benadryl is effective or ineffective and we will go from there.    Recurrent cold sores  Will Complete prior authorization paperwork for acyclovir ointment.               Subjective:      Nicole Marcial is a 73 y.o. female who comes in today to discuss treatment for insomnia.  She has really struggled with getting sleep over the last several weeks.  She has history of sleep difficulties.  She has tried a number of prescription medications to help with sleep.  This has included trazodone, doxepin, melatonin and hydroxyzine.  She has had adverse reactions to many of these medications are they have been ineffective.  Recently tried trazodone again but developed symptoms of sweats, chills, nausea and vomiting.  Recently traveled to [place] the first week of October to pick out some things for a house that she is building down there with her .  She took some old alprazolam with her that she had at home.  Took that for about 4-5 days and it did help her sleep.  However when she got home she research the medication and learned that it can be very addictive.  She historically has taken Benadryl when she has needed help with sleep and this has worked well for her.  However recently became concerned  about reading that she did that suggested it may contribute to development of Alzheimer's disease and there is family history of that in her mother.  She is really not sure what to do.  She has not had much sleep over the last few days.  Really feels that it is getting to be a critical or crisis situation.  She is exhausted and not able to function and would like to discuss options.  She attributes a lot of her sleep difficulties to increased stress and anxiety.  She has a lot going on right now.  She also will need paperwork completed for insurance in order to obtain coverage for acyclovir ointment.  She has history of recurrent cold sores but recently has had adverse reaction to oral antiviral medication such as acyclovir.  She has found that the acyclovir topical ointment works well for her and does not cause her any adverse side effects.  She is hoping to get special insurance approval for this to be covered.  Reviewed medications and allergies.  No other concerns or questions.    Current Outpatient Prescriptions   Medication Sig Dispense Refill     acyclovir (ZOVIRAX) 5 % ointment Apply up to 5 times daily topically 15 g 1     albuterol (PROAIR HFA;PROVENTIL HFA;VENTOLIN HFA) 90 mcg/actuation inhaler Inhale 2 puffs every 6 (six) hours as needed for wheezing or shortness of breath. 1 Inhaler 5     amLODIPine (NORVASC) 5 MG tablet TAKE ONE TABLET BY MOUTH EVERY DAY 90 tablet 3     busPIRone (BUSPAR) 7.5 MG tablet Take 1.5 tablets (11.25 mg total) by mouth 2 (two) times a day. 270 tablet 3     celecoxib (CELEBREX) 100 MG capsule Take 1 capsule (100 mg total) by mouth 2 (two) times a day. 180 capsule 2     cholecalciferol, vitamin D3, 1,000 unit tablet Take 2,000 Units by mouth daily.        estradiol-norethindrone (ACTIVELLA) 1-0.5 mg per tablet Take 1 tablet by mouth daily. 90 tablet 3     loratadine (CLARITIN) 10 mg tablet Take 10 mg by mouth daily.       ranitidine (ZANTAC) 300 MG tablet TAKE ONE TABLET BY  MOUTH TWICE A DAY AS DIRECTED 180 tablet 3     ALPRAZolam (XANAX) 0.5 MG tablet Take 1 tablet (0.5 mg total) by mouth at bedtime as needed for sleep. 15 tablet 0     No current facility-administered medications for this visit.        Past Medical History, Family History, and Social History reviewed.  Past Medical History:   Diagnosis Date     Back pain      Chronic pain      Eczema      Fibromyalgia      Hypertension      Mild asthma      Nerve pain      Osteoporosis      Past Surgical History:   Procedure Laterality Date     BACK SURGERY      x3     BUNIONECTOMY       parathyroidectomy N/A 08/16/2016    Dr. Godoy     SHOULDER SURGERY       SINUS SURGERY       TONSILLECTOMY       TRIGGER FINGER RELEASE       Adhesive; Amitriptyline; Aspirin (tartrazine only); Cefuroxime axetil; Cephalosporins; Chloral hydrate analogues; Chlorhexidine gluconate; Clindamycin; Codeine; Dairy; Doxepin; Ergot alkaloids; Erythromycin base; Gluten; Hydroxyzine; Latex; Morphine; Nickel; Nortriptyline; Pregabalin; Pseudoephedrine; Terconazole; Tramadol; Trazodone; Venlafaxine; Venom-honey bee; Vistaril [hydroxyzine hcl]; and Metronidazole  Family History   Problem Relation Age of Onset     No Medical Problems Mother      Heart disease Father      Sleep apnea Sister      Snoring Sister      Heart disease Sister      No Medical Problems Daughter      No Medical Problems Maternal Grandmother      No Medical Problems Maternal Grandfather      No Medical Problems Paternal Grandmother      No Medical Problems Paternal Grandfather      No Medical Problems Maternal Aunt      No Medical Problems Paternal Aunt      BRCA 1/2 Neg Hx      Breast cancer Neg Hx      Cancer Neg Hx      Colon cancer Neg Hx      Endometrial cancer Neg Hx      Ovarian cancer Neg Hx      Social History     Social History     Marital status:      Spouse name: N/A     Number of children: N/A     Years of education: N/A     Occupational History     Not on file.     Social  History Main Topics     Smoking status: Former Smoker     Smokeless tobacco: Never Used      Comment: only in college     Alcohol use Yes      Comment: seldom     Drug use: No     Sexual activity: Not Currently     Other Topics Concern     Not on file     Social History Narrative         Review of systems is as stated in HPI, and the remainder of the 10 system review is otherwise negative.    Objective:     Vitals:    10/25/18 1326   BP: 122/88   Patient Site: Right Arm   Patient Position: Sitting   Cuff Size: Adult Regular   Pulse: (!) 112   Temp: 98  F (36.7  C)   TempSrc: Oral   SpO2: 96%   Weight: 129 lb 7 oz (58.7 kg)    Body mass index is 23.67 kg/(m^2).    General appearance: alert, appears stated age and cooperative  Head: Normocephalic, without obvious abnormality, atraumatic  Heart: regular rate and rhythm, S1, S2 normal, no murmur, click, rub or gallop          This note has been dictated using voice recognition software. Any grammatical or context distortions are unintentional and inherent to the the software.

## 2021-06-21 NOTE — PROGRESS NOTES
Assessment/Plan:     1. Primary insomnia     2. Gastroesophageal reflux disease without esophagitis     3. Anxiety     4. Recurrent cold sores     5. Acute non-recurrent maxillary sinusitis         Diagnoses and all orders for this visit:    Primary insomnia  Provide prescription for tizanidine to see if this helps relax muscles and tightness at night and help her get better sleep.  She is planning to try melatonin in the heavy blanket again.  She has alprazolam on hand to use if really needed to get some sleep.    Gastroesophageal reflux disease without esophagitis  She has tried Carafate in the past.  She will continue ranitidine and Tums.  Provided refill of Carafate    Anxiety  She is interested in trying a higher dose of buspirone.  She is currently taking 15 mg twice daily.  We will increase to 22.5 mg twice daily.    Recurrent cold sores  She will submit appeal to insurance company for coverage of acyclovir ointment.    Acute sinusitis  -     azithromycin (ZITHROMAX) 250 MG tablet; Take 2 tabs on day 1 and then 1 tab daily for next 4 days.  Dispense: 6 tablet; Refill: 0               Subjective:      Nicole Marcial is a 73 y.o. female who comes in today to follow-up on insomnia.  She was seen a few weeks ago for this concern.  Feels that her sleep difficulties is due to a lot of stress that she is currently going through.  She is planning to move her 98-year-old mother-in-law tomorrow and has been working hard to clean out and get rid of a needed possessions.  She is also in the process of building a house in [place].  At last office visit, the plan was to try Benadryl to help with sleep as this had worked for her in the past.  However she was also given a prescription for Xanax to have on hand and use only if needed.  She reports that the Benadryl was not helpful.  She continues to only sleep for a couple of hours at night.  She does use alprazolam sparingly and is able to get a couple of hours of  sleep when she uses that medication.  She is planning to try melatonin again.  She has also ordered a heavy blanket to use at night as she recently read that this can be helpful for getting a good night sleep.  She is wondering if may be a trial of a muscle relaxer would help her muscles relax and help her fall asleep.  In the past she has used cyclobenzaprine and this was not helpful.  She has also used tizanidine and this was discontinued in 2016 due to ineffectiveness.  She is wondering if she could try the tizanidine again.  She also got another denial from insurance for coverage of acyclovir ointment.  She is planning to appeal this again.  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  She has not had any GI upset or other significant GI symptoms since she stopped oral antiviral medications.  She continues to get recurrent outbreaks of cold sores in the ointment works well.  She received a new formulation of medical marijuana and that does help her sleep a little bit better but it causes her to have constipation and stomach upset.  She is not sure if she should put up with the GI side effects with this in order to get better sleep.  She is using ranitidine.  She also uses Tums.  She wonders about other options.  She also thinks that she has a sinus infection.  For past couple of months she has had pain and pressure in the cheeks and sinuses as well as in her upper teeth associated with purulent sinus drainage.  Reviewed medications and allergies.  Review of systems otherwise negative.  No other concerns today.    Current Outpatient Medications   Medication Sig Dispense Refill     acyclovir (ZOVIRAX) 5 % ointment Apply up to 5 times daily topically 15 g 1     albuterol (PROAIR HFA;PROVENTIL HFA;VENTOLIN HFA) 90 mcg/actuation inhaler Inhale 2 puffs every 6 (six) hours as needed for wheezing or shortness of breath. 1 Inhaler 5     ALPRAZolam (XANAX) 0.5 MG  tablet Take 1 tablet (0.5 mg total) by mouth at bedtime as needed for sleep. 15 tablet 0     amLODIPine (NORVASC) 5 MG tablet TAKE ONE TABLET BY MOUTH EVERY DAY 90 tablet 3     celecoxib (CELEBREX) 100 MG capsule Take 1 capsule (100 mg total) by mouth 2 (two) times a day. 180 capsule 2     cholecalciferol, vitamin D3, 1,000 unit tablet Take 2,000 Units by mouth daily.        estradiol-norethindrone (ACTIVELLA) 1-0.5 mg per tablet Take 1 tablet by mouth daily. 90 tablet 3     ranitidine (ZANTAC) 300 MG tablet TAKE ONE TABLET BY MOUTH TWICE A DAY AS DIRECTED 180 tablet 3     azithromycin (ZITHROMAX) 250 MG tablet Take 2 tabs on day 1 and then 1 tab daily for next 4 days. 6 tablet 0     busPIRone (BUSPAR) 15 MG tablet Take 1.5 tablets (22.5 mg total) by mouth 2 (two) times a day. 90 tablet 3     estradiol (ESTRACE) 0.5 MG tablet Take 0.5 mg by mouth.       loratadine (CLARITIN) 10 mg tablet Take 10 mg by mouth daily.       sucralfate (CARAFATE) 1 gram tablet Take 1 tablet (1 g total) by mouth 4 (four) times a day. 120 tablet 1     tiZANidine (ZANAFLEX) 2 MG tablet Take 1 tablet (2 mg total) by mouth every 8 (eight) hours as needed. 90 tablet 0     No current facility-administered medications for this visit.        Past Medical History, Family History, and Social History reviewed.  Past Medical History:   Diagnosis Date     Back pain      Chronic pain      Eczema      Fibromyalgia      Hypertension      Mild asthma      Nerve pain      Osteoporosis      Past Surgical History:   Procedure Laterality Date     BACK SURGERY      x3     BUNIONECTOMY       parathyroidectomy N/A 08/16/2016    Dr. Godoy     SHOULDER SURGERY       SINUS SURGERY       TONSILLECTOMY       TRIGGER FINGER RELEASE       Adhesive; Amitriptyline; Aspirin (tartrazine only); Cefuroxime axetil; Cephalosporins; Chloral hydrate analogues; Chlorhexidine gluconate; Clindamycin; Codeine; Dairy; Doxepin; Ergot alkaloids; Erythromycin base; Gluten; Hydroxyzine;  Latex; Morphine; Nickel; Nortriptyline; Pregabalin; Pseudoephedrine; Terconazole; Tramadol; Trazodone; Venlafaxine; Venom-honey bee; Vistaril [hydroxyzine hcl]; and Metronidazole  Family History   Problem Relation Age of Onset     No Medical Problems Mother      Heart disease Father      Sleep apnea Sister      Snoring Sister      Heart disease Sister      No Medical Problems Daughter      No Medical Problems Maternal Grandmother      No Medical Problems Maternal Grandfather      No Medical Problems Paternal Grandmother      No Medical Problems Paternal Grandfather      No Medical Problems Maternal Aunt      No Medical Problems Paternal Aunt      BRCA 1/2 Neg Hx      Breast cancer Neg Hx      Cancer Neg Hx      Colon cancer Neg Hx      Endometrial cancer Neg Hx      Ovarian cancer Neg Hx      Social History     Socioeconomic History     Marital status:      Spouse name: Not on file     Number of children: Not on file     Years of education: Not on file     Highest education level: Not on file   Social Needs     Financial resource strain: Not on file     Food insecurity - worry: Not on file     Food insecurity - inability: Not on file     Transportation needs - medical: Not on file     Transportation needs - non-medical: Not on file   Occupational History     Not on file   Tobacco Use     Smoking status: Former Smoker     Smokeless tobacco: Never Used     Tobacco comment: only in college   Substance and Sexual Activity     Alcohol use: Yes     Comment: seldom     Drug use: No     Sexual activity: Not Currently   Other Topics Concern     Not on file   Social History Narrative     Not on file         Review of systems is as stated in HPI, and the remainder of the 10 system review is otherwise negative.    Objective:     Vitals:    11/14/18 1035   BP: 137/68   Patient Site: Left Arm   Patient Position: Sitting   Cuff Size: Adult Regular   Pulse: 79   Temp: 98  F (36.7  C)   TempSrc: Oral   SpO2: 96%   Weight: 130  lb (59 kg)    Body mass index is 23.78 kg/m .    General appearance: alert, appears stated age and cooperative  Head: Normocephalic, without obvious abnormality, atraumatic  Psych; Mood appropriate, affect normal          This note has been dictated using voice recognition software. Any grammatical or context distortions are unintentional and inherent to the the software.

## 2021-06-22 NOTE — PROGRESS NOTES
Assessment/Plan:     1. Anxiety     2. GERD (gastroesophageal reflux disease)  ranitidine (ZANTAC) 300 MG tablet   3. Essential hypertension         Diagnoses and all orders for this visit:    Anxiety  We discussed options for improving management of her anxiety.  Her GI doctor was going to refer her to some behavioral therapy but this likely will not occur until the spring.  She is planning to go to [place] within the next couple of weeks as they are in the process of finishing up on a home they are building down there and she typically hdz in [place].  Encouraged her to be more consistent with use of buspirone and take it twice daily as prescribed.  We previously have discussed that alprazolam is not good for frequent use for management of anxiety and was only to be used when absolutely needed to help fall asleep.  We will discontinue the alprazolam at this point.  We will try something longer acting and prescription for clonazepam sent to pharmacy.  Counseled on use of medication.  Again discussed that this is also a benzodiazepine medication and has the same risks associated with alprazolam and should only be used sparingly when absolutely needed.  I agree with recommendation for behavioral therapy.  Anticipate that anxiety will improve once a lot of her stress levels are alleviated particularly with completion of the house in [place] and getting her mother-in-law settled into assisted living.  Provided emotional support.  She has not tolerated multiple SSRI medications in the past due to a number of adverse side effects.    GERD (gastroesophageal reflux disease)  -     ranitidine (ZANTAC) 300 MG tablet  Dispense: 180 tablet; Refill: 3  -She will continue ranitidine 150 mg at at bedtime and Protonix 40 mg twice daily.  She will follow-up with GI doctor in spring    Essential hypertension  Controlled with current medications    Insomnia  -     clonazePAM (KLONOPIN) 0.5 MG tablet  Dispense: 60 tablet;  Refill: 0--advised to use only when absolutely needed  -She was given prescription for amitriptyline by GI doctor.  Encouraged her to stick with this a little bit longer as adverse side effects of fogginess and grogginess may improve as she gets used to the medication.  Also recommend reducing dosage to half a tablet at bedtime.  She will notify me on effectiveness of this change in medication             The following are part of a depression follow up plan for the patient:  mental health care management    Subjective:      Nicole Marcial is a 73 y.o. female who comes in today to discuss treatment for anxiety.  She has been struggling with increased anxiety and stress a lot over the past couple of months.  This has been associated with increased upper abdominal discomfort and with worsening of acid reflux.  She had a right upper quadrant ultrasound in December that did not show any abnormalities.  She underwent upper endoscopy in December as well.  Was found to have esophageal reflux with esophagitis and gastropathy.  She was advised to discontinue ranitidine and start Protonix 40 mg daily at night.  This was not helpful.  She also noticed that it caused a slight headache which she rated about 3 out of 10.  She has had headache in the past with omeprazole and Nexium.  She did stop the medication due to the headache but ultimately decided to restart it.  She noticed a little bit of improvement but still woke up every couple of hours and needed to take a Tums.  She was seen by her GI specialist 2 days ago and was advised to increase Protonix to 40 mg twice daily and to resume ranitidine but take 1/2 tablet, which would be 150 mg, at bedtime.  With this change and she was able to sleep through the night for the first time in a long time.  She was very pleased by this.  She was advised to do a follow-up via the GI portal in 2 weeks and she will follow-up with the gastroenterologist in the spring.  GI doctor also  started her on a low-dose of amitriptyline 10 mg at night.  She did take this but feels like it makes her feel very groggy and foggy and she is not sure that she wants to continue with this medication.  This was to primarily help with sleep.  He also advised her to discuss treatment of anxiety with her primary care provider.  She has long history of anxiety as well as depression.  History of multiple medication intolerances as well.  Currently using alprazolam on an as-needed basis for anxiety.  Continues to struggle with getting to sleep at night.  Has been using alprazolam to help with this because it is quite effective but she still wakes up every couple of hours.  Gene site testing previously performed showing Ativan and clonazepam would also be expected to work as expected.  She is on buspirone but admits that she often will forget to take her afternoon dose.  We reviewed medications and allergies.  Chart is updated.  Review of systems assessed and otherwise negative.  No other concerns or questions today.    Current Outpatient Medications   Medication Sig Dispense Refill     amLODIPine (NORVASC) 5 MG tablet TAKE ONE TABLET BY MOUTH EVERY DAY 90 tablet 3     busPIRone (BUSPAR) 15 MG tablet Take 1.5 tablets (22.5 mg total) by mouth 2 (two) times a day. 90 tablet 3     cholecalciferol, vitamin D3, 1,000 unit tablet Take 2,000 Units by mouth daily.        ESTRACE 0.01 % (0.1 mg/gram) vaginal cream   3     estradiol-norethindrone (ACTIVELLA) 1-0.5 mg per tablet Take 1 tablet by mouth daily. 90 tablet 3     loratadine (CLARITIN) 10 mg tablet Take 10 mg by mouth daily.       pantoprazole (PROTONIX) 40 MG tablet Take 40 mg by mouth 2 (two) times a day.              acyclovir (ZOVIRAX) 5 % ointment Apply up to 5 times daily topically 15 g 1     albuterol (PROAIR HFA;PROVENTIL HFA;VENTOLIN HFA) 90 mcg/actuation inhaler Inhale 2 puffs every 6 (six) hours as needed for wheezing or shortness of breath. 1 Inhaler 5      clonazePAM (KLONOPIN) 0.5 MG tablet Take 1 tablet (0.5 mg total) by mouth 2 (two) times a day as needed for anxiety. 60 tablet 0     estradiol (ESTRACE) 0.5 MG tablet Take 0.5 mg by mouth.       ranitidine (ZANTAC) 300 MG tablet Take 1/2 tablet by mouth at  tablet 3     sucralfate (CARAFATE) 1 gram tablet Take 1 tablet (1 g total) by mouth 4 (four) times a day. 120 tablet 1     No current facility-administered medications for this visit.        Past Medical History, Family History, and Social History reviewed.  Past Medical History:   Diagnosis Date     Back pain      Chronic pain      Eczema      Fibromyalgia      Hypertension      Mild asthma      Nerve pain      Osteoporosis      Past Surgical History:   Procedure Laterality Date     BACK SURGERY      x3     BUNIONECTOMY       parathyroidectomy N/A 08/16/2016    Dr. Godoy     SHOULDER SURGERY       SINUS SURGERY       TONSILLECTOMY       TRIGGER FINGER RELEASE       Adhesive; Amitriptyline; Aspirin (tartrazine only); Cefuroxime axetil; Cephalosporins; Chloral hydrate analogues; Chlorhexidine gluconate; Clindamycin; Codeine; Dairy; Doxepin; Ergot alkaloids; Erythromycin base; Gluten; Hydroxyzine; Latex; Morphine; Nickel; Nortriptyline; Pregabalin; Pseudoephedrine; Terconazole; Tramadol; Trazodone; Venlafaxine; Venom-honey bee; Vistaril [hydroxyzine hcl]; and Metronidazole  Family History   Problem Relation Age of Onset     No Medical Problems Mother      Heart disease Father      Sleep apnea Sister      Snoring Sister      Heart disease Sister      No Medical Problems Daughter      No Medical Problems Maternal Grandmother      No Medical Problems Maternal Grandfather      No Medical Problems Paternal Grandmother      No Medical Problems Paternal Grandfather      No Medical Problems Maternal Aunt      No Medical Problems Paternal Aunt      BRCA 1/2 Neg Hx      Breast cancer Neg Hx      Cancer Neg Hx      Colon cancer Neg Hx      Endometrial cancer Neg Hx       Ovarian cancer Neg Hx      Social History     Socioeconomic History     Marital status:      Spouse name: Not on file     Number of children: Not on file     Years of education: Not on file     Highest education level: Not on file   Social Needs     Financial resource strain: Not on file     Food insecurity - worry: Not on file     Food insecurity - inability: Not on file     Transportation needs - medical: Not on file     Transportation needs - non-medical: Not on file   Occupational History     Not on file   Tobacco Use     Smoking status: Former Smoker     Smokeless tobacco: Never Used     Tobacco comment: only in college   Substance and Sexual Activity     Alcohol use: Yes     Comment: seldom     Drug use: No     Sexual activity: Not Currently   Other Topics Concern     Not on file   Social History Narrative     Not on file         Review of systems is as stated in HPI, and the remainder of the 10 system review is otherwise negative.    Objective:     Vitals:    01/09/19 1347   BP: 120/62   Patient Site: Right Arm   Patient Position: Sitting   Cuff Size: Adult Regular   Pulse: 87   Temp: 97.5  F (36.4  C)   TempSrc: Oral   SpO2: 97%   Weight: 133 lb (60.3 kg)    Body mass index is 24.33 kg/m .    General appearance: alert, appears stated age and cooperative  Psych: mood appropriate, affect normal    Results: PHQ-9: 15   GHAZALA-7: 18      This note has been dictated using voice recognition software. Any grammatical or context distortions are unintentional and inherent to the the software.

## 2021-06-22 NOTE — PROGRESS NOTES
Assessment/Plan:     1. Epigastric pain  Ambulatory referral for Upper GI Endoscopy    US Abdomen Limited    Hepatic Profile    Basic Metabolic Panel    HM2(CBC w/o Differential)    Lipase   2. Visit for screening mammogram  Mammo Screening Bilateral   3. RUQ abdominal pain  Ambulatory referral for Upper GI Endoscopy    US Abdomen Limited    Hepatic Profile    Basic Metabolic Panel    HM2(CBC w/o Differential)    Lipase   4. Insomnia, unspecified type         Diagnoses and all orders for this visit:    Epigastric pain  -     Ambulatory referral for Upper GI Endoscopy  -     US Abdomen Limited; Future; Expected date: 12/05/2018  -     Hepatic Profile  -     Basic Metabolic Panel  -     HM2(CBC w/o Differential)  -     Lipase  -Hemogram is checked today and is normal.  Differential diagnosis would include GERD, gastritis, stomach ulcer, pancreatitis, and cholelithiasis.  Recommend right upper quadrant ultrasound as well as upper endoscopy.  Continue ranitidine, Tums and Carafate.  Check hepatic profile, lipase and hepatic profile.  Further recommendations will be made once results are available.    Visit for screening mammogram  -     Mammo Screening Bilateral; Future; Expected date: 12/05/2018    RUQ abdominal pain  -     Ambulatory referral for Upper GI Endoscopy  -     US Abdomen Limited; Future; Expected date: 12/05/2018  -     Hepatic Profile  -     Basic Metabolic Panel  -     HM2(CBC w/o Differential)  -     Lipase  --Hemogram is checked today and is normal.  Differential diagnosis would include GERD, gastritis, stomach ulcer, pancreatitis, and cholelithiasis.  Recommend right upper quadrant ultrasound as well as upper endoscopy.  Continue ranitidine, Tums and Carafate.  Check hepatic profile, lipase and hepatic profile.  Further recommendations will be made once results are available.    Insomnia, unspecified type  -     ALPRAZolam (XANAX) 0.5 MG tablet; Take 1 tablet (0.5 mg total) by mouth at bedtime as  needed for sleep.  Dispense: 15 tablet; Refill: 0  -She will continue to use alprazolam sparingly as needed for insomnia.  Refill provided.           Subjective:      Nicole Marcial is a 73 y.o. female who comes in today to follow-up on epigastric/right upper quadrant discomfort.  She was seen in clinic about 3 weeks ago for this concern. States that she did describe discomfort in the right upper quadrant at her last office visit but I do not see documentation of this in her record.  She had mentioned stomach upset associated with medical cannabis as well as oral antiviral medications as well as symptoms of esophageal reflux despite use of ranitidine and Tums.  We elected to restart Carafate which she had used in the past.  She initially had some difficulty taking the medication but has now been able to take it consistently 4 times a day as prescribed.  She is really not sure if it is causing any relief of symptoms.  She is noticing worsening pain in mid upper abdomen and right upper abdomen.  This seems to be worse at night.  She stopped taking Celebrex about 10 days ago to see if this would help relieve the pain.  It does not seem to make much difference.  She has noticed a little bit of constipation.  No diarrhea.  No fevers or chills.  No nausea or vomiting.  Continues to be under quite a bit of stress.  Moved her mother-in-law into a long-term care facility but mother-in-law has had difficult transition and has fallen twice since the move occurred a few weeks ago.  She continues to have stress associated with building a house in [place].  Continues to have difficulty sleeping.  The only thing that really seems to work for her is Xanax and she tries to use that very sparingly.  She would like a refill if possible.  Describes pain around the bellybutton and in the mid upper abdomen going over into the right upper quadrant.  Not necessarily worse with eating particular types of foods.  Reviewed medications and  allergies.  Review of systems is assessed and is of    Current Outpatient Medications   Medication Sig Dispense Refill     acyclovir (ZOVIRAX) 5 % ointment Apply up to 5 times daily topically 15 g 1     albuterol (PROAIR HFA;PROVENTIL HFA;VENTOLIN HFA) 90 mcg/actuation inhaler Inhale 2 puffs every 6 (six) hours as needed for wheezing or shortness of breath. 1 Inhaler 5     ALPRAZolam (XANAX) 0.5 MG tablet Take 1 tablet (0.5 mg total) by mouth at bedtime as needed for sleep. 15 tablet 0     amLODIPine (NORVASC) 5 MG tablet TAKE ONE TABLET BY MOUTH EVERY DAY 90 tablet 3     busPIRone (BUSPAR) 15 MG tablet Take 1.5 tablets (22.5 mg total) by mouth 2 (two) times a day. 90 tablet 3     cholecalciferol, vitamin D3, 1,000 unit tablet Take 2,000 Units by mouth daily.        estradiol (ESTRACE) 0.5 MG tablet Take 0.5 mg by mouth.       estradiol-norethindrone (ACTIVELLA) 1-0.5 mg per tablet Take 1 tablet by mouth daily. 90 tablet 3     loratadine (CLARITIN) 10 mg tablet Take 10 mg by mouth daily.       ranitidine (ZANTAC) 300 MG tablet TAKE ONE TABLET BY MOUTH TWICE A DAY AS DIRECTED 180 tablet 3     sucralfate (CARAFATE) 1 gram tablet Take 1 tablet (1 g total) by mouth 4 (four) times a day. 120 tablet 1     celecoxib (CELEBREX) 100 MG capsule Take 1 capsule (100 mg total) by mouth 2 (two) times a day. (Patient not taking: Reported on 12/5/2018.      ) 180 capsule 2     No current facility-administered medications for this visit.        Past Medical History, Family History, and Social History reviewed.  Past Medical History:   Diagnosis Date     Back pain      Chronic pain      Eczema      Fibromyalgia      Hypertension      Mild asthma      Nerve pain      Osteoporosis      Past Surgical History:   Procedure Laterality Date     BACK SURGERY      x3     BUNIONECTOMY       parathyroidectomy N/A 08/16/2016    Dr. Godoy     SHOULDER SURGERY       SINUS SURGERY       TONSILLECTOMY       TRIGGER FINGER RELEASE       Adhesive;  Amitriptyline; Aspirin (tartrazine only); Cefuroxime axetil; Cephalosporins; Chloral hydrate analogues; Chlorhexidine gluconate; Clindamycin; Codeine; Dairy; Doxepin; Ergot alkaloids; Erythromycin base; Gluten; Hydroxyzine; Latex; Morphine; Nickel; Nortriptyline; Pregabalin; Pseudoephedrine; Terconazole; Tramadol; Trazodone; Venlafaxine; Venom-honey bee; Vistaril [hydroxyzine hcl]; and Metronidazole  Family History   Problem Relation Age of Onset     No Medical Problems Mother      Heart disease Father      Sleep apnea Sister      Snoring Sister      Heart disease Sister      No Medical Problems Daughter      No Medical Problems Maternal Grandmother      No Medical Problems Maternal Grandfather      No Medical Problems Paternal Grandmother      No Medical Problems Paternal Grandfather      No Medical Problems Maternal Aunt      No Medical Problems Paternal Aunt      BRCA 1/2 Neg Hx      Breast cancer Neg Hx      Cancer Neg Hx      Colon cancer Neg Hx      Endometrial cancer Neg Hx      Ovarian cancer Neg Hx      Social History     Socioeconomic History     Marital status:      Spouse name: Not on file     Number of children: Not on file     Years of education: Not on file     Highest education level: Not on file   Social Needs     Financial resource strain: Not on file     Food insecurity - worry: Not on file     Food insecurity - inability: Not on file     Transportation needs - medical: Not on file     Transportation needs - non-medical: Not on file   Occupational History     Not on file   Tobacco Use     Smoking status: Former Smoker     Smokeless tobacco: Never Used     Tobacco comment: only in college   Substance and Sexual Activity     Alcohol use: Yes     Comment: seldom     Drug use: No     Sexual activity: Not Currently   Other Topics Concern     Not on file   Social History Narrative     Not on file         Review of systems is as stated in HPI, and the remainder of the 10 system review is otherwise  negative.    Objective:     Vitals:    12/05/18 1341   BP: 121/64   Patient Site: Right Arm   Patient Position: Sitting   Cuff Size: Adult Regular   Pulse: (!) 118   Temp: 98.6  F (37  C)   TempSrc: Oral   SpO2: 98%   Weight: 130 lb (59 kg)    Body mass index is 23.78 kg/m .    General appearance: alert, appears stated age and cooperative  Head: Normocephalic, without obvious abnormality, atraumatic  Lungs: clear to auscultation bilaterally  Heart: regular rate and rhythm, S1, S2 normal, no murmur, click, rub or gallop  Abdomen: soft, normal bowel sounds, TTP in mid-epigastric region, no rebound, no guarding      Recent Results (from the past 24 hour(s))   HM2(CBC w/o Differential)    Collection Time: 12/05/18  2:13 PM   Result Value Ref Range    WBC 7.6 4.0 - 11.0 thou/uL    RBC 4.45 3.80 - 5.40 mill/uL    Hemoglobin 14.3 12.0 - 16.0 g/dL    Hematocrit 41.7 35.0 - 47.0 %    MCV 94 80 - 100 fL    MCH 32.1 27.0 - 34.0 pg    MCHC 34.4 32.0 - 36.0 g/dL    RDW 11.5 11.0 - 14.5 %    Platelets 220 140 - 440 thou/uL    MPV 7.7 7.0 - 10.0 fL       This note has been dictated using voice recognition software. Any grammatical or context distortions are unintentional and inherent to the the software.

## 2021-06-22 NOTE — TELEPHONE ENCOUNTER
New Appointment Needed  What is the reason for the visit:    Gastroenterologist wants patient to see  PCP for possible anxiety treatment  Provider Preference: PCP only  How soon do you need to be seen?: This week, patient has appointment scheduled for 01/14/19, however, would like sooner appointment. Please follow up with patient for scheduling.   Waitlist offered?: No  Okay to leave a detailed message:  Yes

## 2021-06-22 NOTE — PATIENT INSTRUCTIONS - HE
Rx for clonazepam sent to pharmacy. Take only when really needed.     Try 1/2 tablet of elavil (amitryptiline)--this may cause less grogginess. Try to stick with it for 1-2 weeks because side effects may improve as you get used to the medication    Try to be consistent with taking buspirone twice daily

## 2021-06-25 NOTE — TELEPHONE ENCOUNTER
Refill Approved    Rx renewed per Medication Renewal Policy. Medication was last renewed on 6/1/20, last OV 5/10/21.    Tomasa Godinez, [facility] Triage/Med Refill 6/13/2021     Requested Prescriptions   Pending Prescriptions Disp Refills     amLODIPine (NORVASC) 5 MG tablet 90 tablet 3     Sig: TAKE ONE TABLET BY MOUTH EVERY DAY       Calcium-Channel Blockers Protocol Passed - 6/11/2021 12:19 PM        Passed - PCP or prescribing provider visit in past 12 months or next 3 months     Last office visit with prescriber/PCP: 5/10/2021 Amirah Moran MD OR same dept: 5/10/2021 Amirah Moran MD OR same specialty: 5/10/2021 Amirah Moran MD  Last physical: 8/13/2020 Last MTM visit: Visit date not found   Next visit within 3 mo: Visit date not found  Next physical within 3 mo: Visit date not found  Prescriber OR PCP: Amirah Moran MD  Last diagnosis associated with med order: 1. Essential hypertension  - amLODIPine (NORVASC) 5 MG tablet; TAKE ONE TABLET BY MOUTH EVERY DAY  Dispense: 90 tablet; Refill: 3    If protocol passes may refill for 12 months if within 3 months of last provider visit (or a total of 15 months).             Passed - Blood pressure filed in past 12 months     BP Readings from Last 1 Encounters:   05/10/21 120/70

## 2021-07-09 ENCOUNTER — COMMUNICATION - HEALTHEAST (OUTPATIENT)
Dept: INTERNAL MEDICINE | Facility: CLINIC | Age: 76
End: 2021-07-09

## 2021-07-12 ENCOUNTER — TELEPHONE (OUTPATIENT)
Dept: FAMILY MEDICINE | Facility: OTHER | Age: 76
End: 2021-07-12

## 2021-07-12 DIAGNOSIS — M81.0 SENILE OSTEOPOROSIS: Primary | ICD-10-CM

## 2021-07-12 NOTE — TELEPHONE ENCOUNTER
"Reason for Call:  Other appointment    Detailed comments: \"  Nicole Marcial Maja, MD 3 days ago   JL  I would like to schedule a delay scan and then a follow up appointment with Dr. Savage for osteoporosis evaluation as suggested by my primary .  I have not been able to do this with phone calls.  Can someone help me coordinate this?  Thanks, Nicole Marcial. [phone].           "

## 2021-07-21 ENCOUNTER — RECORDS - HEALTHEAST (OUTPATIENT)
Dept: ADMINISTRATIVE | Facility: CLINIC | Age: 76
End: 2021-07-21

## 2021-07-22 ENCOUNTER — RECORDS - HEALTHEAST (OUTPATIENT)
Dept: FAMILY MEDICINE | Facility: CLINIC | Age: 76
End: 2021-07-22

## 2021-07-22 DIAGNOSIS — Z12.31 OTHER SCREENING MAMMOGRAM: ICD-10-CM

## 2021-07-23 ENCOUNTER — ANCILLARY PROCEDURE (OUTPATIENT)
Dept: BONE DENSITY | Facility: CLINIC | Age: 76
End: 2021-07-23
Attending: STUDENT IN AN ORGANIZED HEALTH CARE EDUCATION/TRAINING PROGRAM
Payer: COMMERCIAL

## 2021-07-23 DIAGNOSIS — M81.0 SENILE OSTEOPOROSIS: ICD-10-CM

## 2021-07-23 PROCEDURE — 77080 DXA BONE DENSITY AXIAL: CPT | Mod: TC | Performed by: RADIOLOGY

## 2021-07-23 PROCEDURE — 77081 DXA BONE DENSITY APPENDICULR: CPT | Mod: TC | Performed by: RADIOLOGY

## 2021-08-06 NOTE — PATIENT INSTRUCTIONS - HE
Patient Instructions by Amirah Moran MD at 8/13/2020 10:40 AM     Author: Amirah Moran MD Service: -- Author Type: Physician    Filed: 8/13/2020 11:13 AM Encounter Date: 8/13/2020 Status: Addendum    : Amirah Moran MD (Physician)    Related Notes: Original Note by Amirah Moran MD (Physician) filed at 8/13/2020 11:11 AM       You can consider increasing the dose of amitryptiline    Schedule mammogram    Please bring in copy of advance directives    Patient Education   Your Health Risk Assessment indicates you feel you are not in good emotional health.    Recreation   Recreation is not limited to sports and team events. It includes any activity that provides relaxation, interest, enjoyment, and exercise. Recreation provides an outlet for physical, mental, and social energy. It can give a sense of worth and achievement. It can help you stay healthy.    Mental Exercise and Social Involvement  Mental and emotional health is as important as physical health. Keep in touch with friends and family. Stay as active as possible. Continue to learn and challenge yourself.   Things you can do to stay mentally active are:    Learn something new, like a foreign language or musical instrument.     Play SCRABBLE or do crossword puzzles. If you cannot find people to play these games with you at home, you can play them with others on your computer through the Internet.     Join a games club--anything from card games to chess or checkers or lawn bowling.     Start a new hobby.     Go back to school.     Volunteer.     Read.     Keep up with world events.       Patient Education   Depression and Suicide in Older Adults  Nearly 2 million older Americans have some type of depression. Sadly, some of them even take their own lives. Yet depression among older adults is often ignored. Learn the warning signs. You may help spare a loved one needless pain. You may also save a life.       What Is Depression?  Depression is a mood  disorder that affects the way you think and feel. The most common symptom is a feeling of deep sadness. People who are depressed also may seem tired and listless. And nothing seems to give them pleasure. Its normal to grieve or be sad sometimes. But sadness lessens or passes with time. Depression rarely goes away or improves on its own. Other symptoms of depression are:    Sleeping more or less than normal    Eating more or less than normal    Having headaches, stomachaches, or other pains that dont go away    Feeling nervous, empty, or worthless    Crying a great deal    Thinking or talking about suicide or death    Feeling confused or forgetful  What Causes It?  The causes of depression arent fully known. Certain chemicals in the brain play a role. Depression does run in families. And life stresses can also trigger depression in some people. That may be the case with older adults. They often face great burdens, such as the death of friends or a spouse. They may have failing health. And they are more likely to be alone, lonely, or poor.  How You Can Help  Often, depressed people may not want to ask for help. When they do, they may be ignored. Or, they may receive the wrong treatment. You can help by showing parents and older friends love and support. If they seem depressed, help them find the right treatment. Talk to your doctor. Or contact a local mental health center, social service agency, or hospital. With modern treatment, no one has to suffer from depression.  Resources:    National [place] of Mental Health  174.904.7642  www.nimh.nih.gov    National [place] on Mental Illness  882.829.3411  www.[place].org    [facility] [place]  782.799.8439  www.[facility].org    National Suicide Hotline  687.322.5422 (800-SUICIDE)      8621-7859 [employer]. [address] [place] [address], [place] [address]. All rights reserved. This information is not intended as a substitute for professional medical care. Always follow  your healthcare professional's instructions.           Advance Directive  Patients advance directive was discussed and I am comfortable with the patients wishes.  Patient Education   Personalized Prevention Plan  You are due for the preventive services outlined below.  Your care team is available to assist you in scheduling these services.  If you have already completed any of these items, please share that information with your care team to update in your medical record.  Health Maintenance   Topic Date Due   ? ASTHMA ACTION PLAN  1945   ? DEPRESSION ACTION PLAN  1945   ? MEDICARE ANNUAL WELLNESS VISIT  05/31/2018   ? ADVANCE CARE PLANNING  09/05/2019   ? Asthma Control Test  05/22/2020   ? INFLUENZA VACCINE RULE BASED (1) 08/01/2020   ? DXA SCAN  08/16/2020   ? MAMMOGRAM  12/24/2020   ? FALL RISK ASSESSMENT  07/06/2021   ? LIPID  07/14/2025   ? COLORECTAL CANCER SCREENING  03/30/2027   ? TD 18+ HE  04/11/2028   ? HEPATITIS C SCREENING  Completed   ? PNEUMOCOCCAL IMMUNIZATION 65+ LOW/MEDIUM RISK  Completed   ? ZOSTER VACCINES  Completed   ? HEPATITIS B VACCINES  Aged Out

## 2021-08-17 ENCOUNTER — OFFICE VISIT (OUTPATIENT)
Dept: INTERNAL MEDICINE | Facility: CLINIC | Age: 76
End: 2021-08-17
Payer: COMMERCIAL

## 2021-08-17 VITALS
DIASTOLIC BLOOD PRESSURE: 72 MMHG | HEART RATE: 84 BPM | SYSTOLIC BLOOD PRESSURE: 136 MMHG | WEIGHT: 143 LBS | BODY MASS INDEX: 27.02 KG/M2

## 2021-08-17 DIAGNOSIS — M81.0 SENILE OSTEOPOROSIS: Primary | ICD-10-CM

## 2021-08-17 DIAGNOSIS — L20.84 INTRINSIC ECZEMA: ICD-10-CM

## 2021-08-17 LAB
ANION GAP SERPL CALCULATED.3IONS-SCNC: 11 MMOL/L (ref 5–18)
BUN SERPL-MCNC: 19 MG/DL (ref 8–28)
CALCIUM SERPL-MCNC: 9.6 MG/DL (ref 8.5–10.5)
CHLORIDE BLD-SCNC: 105 MMOL/L (ref 98–107)
CO2 SERPL-SCNC: 24 MMOL/L (ref 22–31)
CREAT SERPL-MCNC: 0.92 MG/DL (ref 0.6–1.1)
GFR SERPL CREATININE-BSD FRML MDRD: 61 ML/MIN/1.73M2
GLUCOSE BLD-MCNC: 99 MG/DL (ref 70–125)
POTASSIUM BLD-SCNC: 4.2 MMOL/L (ref 3.5–5)
SODIUM SERPL-SCNC: 140 MMOL/L (ref 136–145)

## 2021-08-17 PROCEDURE — 36415 COLL VENOUS BLD VENIPUNCTURE: CPT | Performed by: INTERNAL MEDICINE

## 2021-08-17 PROCEDURE — 99213 OFFICE O/P EST LOW 20 MIN: CPT | Performed by: INTERNAL MEDICINE

## 2021-08-17 PROCEDURE — 80048 BASIC METABOLIC PNL TOTAL CA: CPT | Performed by: INTERNAL MEDICINE

## 2021-08-17 RX ORDER — NALOXONE HYDROCHLORIDE 0.4 MG/ML
0.2 INJECTION, SOLUTION INTRAMUSCULAR; INTRAVENOUS; SUBCUTANEOUS
Status: CANCELLED | OUTPATIENT
Start: 2021-08-17

## 2021-08-17 RX ORDER — METHYLPREDNISOLONE SODIUM SUCCINATE 125 MG/2ML
125 INJECTION, POWDER, LYOPHILIZED, FOR SOLUTION INTRAMUSCULAR; INTRAVENOUS
Status: CANCELLED
Start: 2021-08-17

## 2021-08-17 RX ORDER — HEPARIN SODIUM (PORCINE) LOCK FLUSH IV SOLN 100 UNIT/ML 100 UNIT/ML
5 SOLUTION INTRAVENOUS
Status: CANCELLED | OUTPATIENT
Start: 2021-08-17

## 2021-08-17 RX ORDER — HEPARIN SODIUM,PORCINE 10 UNIT/ML
5 VIAL (ML) INTRAVENOUS
Status: CANCELLED | OUTPATIENT
Start: 2021-08-17

## 2021-08-17 RX ORDER — EPINEPHRINE 1 MG/ML
0.3 INJECTION, SOLUTION, CONCENTRATE INTRAVENOUS EVERY 5 MIN PRN
Status: CANCELLED | OUTPATIENT
Start: 2021-08-17

## 2021-08-17 RX ORDER — ALBUTEROL SULFATE 0.83 MG/ML
2.5 SOLUTION RESPIRATORY (INHALATION)
Status: CANCELLED | OUTPATIENT
Start: 2021-08-17

## 2021-08-17 RX ORDER — ALBUTEROL SULFATE 90 UG/1
1-2 AEROSOL, METERED RESPIRATORY (INHALATION)
Status: CANCELLED
Start: 2021-08-17

## 2021-08-17 RX ORDER — DIPHENHYDRAMINE HYDROCHLORIDE 50 MG/ML
50 INJECTION INTRAMUSCULAR; INTRAVENOUS
Status: CANCELLED
Start: 2021-08-17

## 2021-08-17 RX ORDER — ZOLEDRONIC ACID 5 MG/100ML
5 INJECTION, SOLUTION INTRAVENOUS ONCE
Status: CANCELLED
Start: 2021-08-17 | End: 2021-08-17

## 2021-08-17 RX ORDER — MEPERIDINE HYDROCHLORIDE 25 MG/ML
25 INJECTION INTRAMUSCULAR; INTRAVENOUS; SUBCUTANEOUS EVERY 30 MIN PRN
Status: CANCELLED | OUTPATIENT
Start: 2021-08-17

## 2021-08-17 NOTE — PATIENT INSTRUCTIONS
Please schedule Reclast infusion.   Repeat DXA scan in 2 years.      Treatment Options discussed:   Bisphosphonates    IV - Zoledronic acid (Reclast) once a year for 3 years total         -discussed studies have shown that three years of zolendronic acid protects forthe following 3 years as much as being on zolendronic acid for 6 years straight           Flu-like symptoms can occur within the first 24-72 hours of your first infusion of the IVbisphosphonate. This includes low-grade fever, muscle and joint pains. Ibuprofen and/or Tylenol can help these symptoms. Low calcium levels can also occur with the IV bisphosphonates.     Osteonecrosis of the jaw(ONJ) has been rarely associated with bisphosphonate use for osteoporosis.The risk is approximately 1/1700-1/100,000, with development most likely related to invasive dental procedures (extractions, dental implants) and poordental hygiene. Be sure to continue regular dental visits and alert me and/or your dentist for any issues. If you do require invasive dental work, please contact me and we will stop the medication 3 months prior.      -The data available at this time suggests that there is probably a small increase risk of atypical (nontraumatic) subtrochanteric fractures of the femur in patients on bisphosphonate therapy compared to those not onit. One large study suggested that for every 100 fractures prevented with bisphosphonate therapy, less than one femur fracture will occur. Other studies suggest one episode per 2,500 patient years. Patient should call withleg pain.     -Calcium: total calcium intake should be 1200mg per day from dietary sources. If not achieved with diet alone, add in calcium tablet(s). No more than 500mg at one time. Dietary sources: 8 ounces of milk,4 ounces of yogurt or 1 ounce of cheese contain about 300mg calcium per serving, green veggies, almonds, beans, oranges, along with various other plant sources.     -Vitamin D3 recommendations:  2000 IU daily.    -Bone Stimulating Exercise: impact and weight-bearing exercise like walking, jogging, yoga, Manjinder-Chi, stair-climbing, dancing, dry aerobics, and tennis 3-5 times per week for at least 30 minutes & weight-lifting orresistance training 2 times per week, may improve your bone desity and increase your strength, thereby decreasing your risk of fracture.    -Dr. Aisha Cm has a book on Yoga for Osteoporosis and a 12 pose regimenyou can look up online.     -Fall Prevention: avoiding ice, use of [employer] Tracks http://www.[employer]/ to cover shoes in the winter, avoiding loose gravel and uneven terrain, clearing house of cords, throw rugs,avoiding ladders, using caution with stairs and around dogs and small children.

## 2021-08-18 ENCOUNTER — ANCILLARY PROCEDURE (OUTPATIENT)
Dept: GENERAL RADIOLOGY | Facility: CLINIC | Age: 76
End: 2021-08-18
Attending: PODIATRIST
Payer: COMMERCIAL

## 2021-08-18 ENCOUNTER — OFFICE VISIT (OUTPATIENT)
Dept: PODIATRY | Facility: CLINIC | Age: 76
End: 2021-08-18
Attending: PODIATRIST
Payer: COMMERCIAL

## 2021-08-18 VITALS — HEART RATE: 104 BPM | BODY MASS INDEX: 26.31 KG/M2 | OXYGEN SATURATION: 96 % | HEIGHT: 62 IN | WEIGHT: 143 LBS

## 2021-08-18 DIAGNOSIS — M20.10 HALLUX VALGUS, UNSPECIFIED LATERALITY: ICD-10-CM

## 2021-08-18 DIAGNOSIS — M72.2 PLANTAR FASCIITIS: ICD-10-CM

## 2021-08-18 DIAGNOSIS — M20.10 HALLUX VALGUS, UNSPECIFIED LATERALITY: Primary | ICD-10-CM

## 2021-08-18 PROCEDURE — 20550 NJX 1 TENDON SHEATH/LIGAMENT: CPT | Mod: 50 | Performed by: PODIATRIST

## 2021-08-18 PROCEDURE — 73630 X-RAY EXAM OF FOOT: CPT | Mod: TC | Performed by: PODIATRIST

## 2021-08-18 PROCEDURE — 99204 OFFICE O/P NEW MOD 45 MIN: CPT | Mod: 25 | Performed by: PODIATRIST

## 2021-08-18 RX ORDER — DEXAMETHASONE SODIUM PHOSPHATE 4 MG/ML
4 INJECTION, SOLUTION INTRA-ARTICULAR; INTRALESIONAL; INTRAMUSCULAR; INTRAVENOUS; SOFT TISSUE ONCE
Status: COMPLETED | OUTPATIENT
Start: 2021-08-18 | End: 2021-08-18

## 2021-08-18 RX ORDER — LIDOCAINE HYDROCHLORIDE 20 MG/ML
1 INJECTION, SOLUTION INFILTRATION; PERINEURAL ONCE
Status: COMPLETED | OUTPATIENT
Start: 2021-08-18 | End: 2021-08-18

## 2021-08-18 RX ADMIN — DEXAMETHASONE SODIUM PHOSPHATE 4 MG: 4 INJECTION, SOLUTION INTRA-ARTICULAR; INTRALESIONAL; INTRAMUSCULAR; INTRAVENOUS; SOFT TISSUE at 14:44

## 2021-08-18 RX ADMIN — LIDOCAINE HYDROCHLORIDE 1 ML: 20 INJECTION, SOLUTION INFILTRATION; PERINEURAL at 14:45

## 2021-08-18 ASSESSMENT — MIFFLIN-ST. JEOR: SCORE: 1096.89

## 2021-08-18 ASSESSMENT — PAIN SCALES - GENERAL: PAINLEVEL: MODERATE PAIN (5)

## 2021-08-18 NOTE — PROGRESS NOTES
FOOT AND ANKLE SURGERY/PODIATRY Progress Note        ASSESSMENT:   Hallux abductovalgus right foot  Plantar fasciitis bilateral feet    HPI: Nicole Marcial was seen again today presented to the clinic today complaining of a painful bunion right foot.  The patient indicated that she has had this bunion for several years.  It has become a bit more painful over the past several months.  She has an aching pain in the big toe joint which can be aggravated by weightbearing and ambulation.  She denies trauma to her foot.  She has not had any associated redness or swelling.  Her pain is moderate to severe.  Her pain is relieved with nonweightbearing.  The patient also continues to complain of chronic bilateral heel pain.  She has been previously diagnosed with plantar fasciitis.  She has had 2 cortisone injection in the right heel.  The left heel pain has just recently started and she has not had it treated.  She has post static dyskinesia.  She has never had any associated redness or swelling surrounding her heels..      Past Medical History:   Diagnosis Date     Back pain      Chronic pain      Eczema      Fibromyalgia      Hypertension      Mild asthma      Nerve pain      Osteoporosis         Past Surgical History:   Procedure Laterality Date     BACK SURGERY      x3     BUNIONECTOMY       OTHER SURGICAL HISTORY N/A 08/16/2016    parathyroidectomyDr. Walt     RELEASE TRIGGER FINGER       SHOULDER SURGERY       SINUS SURGERY       TONSILLECTOMY         Allergies   Allergen Reactions     Adhesive [Mecrylate] Unknown     Contact allergy     Aspirin (Tartrazine Only) [Tartrazine] Unknown     Abdominal pain       Bee Venom Unknown     Cefuroxime Axetil [Cefuroxime] Unknown     Rash     Cephalosporins Unknown     Rash--ceftin     Chloral Hydrate Analogues [Chloral Hydrate] Unknown     Contact allergy     Chlorhexidine Gluconate [Chlorhexidine] Unknown     Contact allergy     Clindamycin Unknown     C diff     Codeine  Unknown     Dairy [Lac Bovis] Unknown     Doxepin Unknown     hyper     Ergot Alkaloids [Ergoloid Mesylates] Unknown     Erythromycin Base [Erythromycin] Unknown     Stomach pain     Gluten [Gluten Meal] Unknown     Celiac panel not positive, patient states has abdominal pain when she eats gluten     Hydroxyzine Unknown     Latex Unknown     Morphine Unknown     Not effective     Nickel Unknown     Nortriptyline Unknown     hyper     Pregabalin Unknown     Pseudoephedrine Unknown     Terconazole Unknown     Tramadol Unknown     Trazodone Nausea and Vomiting     Venlafaxine Unknown     Vistaril [Hydroxyzine] Unknown     Metronidazole Rash     Rash and neuropathy         Current Outpatient Medications:      acyclovir (ZOVIRAX) 5 % ointment, [ACYCLOVIR (ZOVIRAX) 5 % OINTMENT] Apply up to 5 times daily topically, Disp: 15 g, Rfl: 1     amitriptyline (ELAVIL) 25 MG tablet, [AMITRIPTYLINE (ELAVIL) 25 MG TABLET] Take 2 tablets at bedtime. May increase to 3 tablets at bedtime if tolerated., Disp: 270 tablet, Rfl: 3     amLODIPine (NORVASC) 5 MG tablet, [AMLODIPINE (NORVASC) 5 MG TABLET] TAKE ONE TABLET BY MOUTH EVERY DAY, Disp: 90 tablet, Rfl: 3     busPIRone (BUSPAR) 15 MG tablet, [BUSPIRONE (BUSPAR) 15 MG TABLET] TAKE 1 & 1/2 TABLETS BY MOUTH 2 TIMES DAILY., Disp: 270 tablet, Rfl: 3     cholecalciferol, vitamin D3, 1,000 unit tablet, [CHOLECALCIFEROL, VITAMIN D3, 1,000 UNIT TABLET] Take 2,000 Units by mouth daily. , Disp: , Rfl:      estradiol-norethindrone (ACTIVELLA) 1-0.5 mg per tablet, [ESTRADIOL-NORETHINDRONE (ACTIVELLA) 1-0.5 MG PER TABLET] Take 1 tablet by mouth daily., Disp: 84 tablet, Rfl: 3     famotidine (FOR PEPCID) 10 MG tablet, [FAMOTIDINE (FOR PEPCID) 10 MG TABLET] Take 20 mg by mouth daily. Takes two at night , and one in the morning , Disp: , Rfl:      famotidine-calcium carbonate-magnesium hydroxide (PEPCID COMPLETE) -165 mg Chew, [FAMOTIDINE-CALCIUM CARBONATE-MAGNESIUM HYDROXIDE (PEPCID COMPLETE)  -165 MG CHEW] Chew 1 tablet daily as needed., Disp: , Rfl:      loratadine (CLARITIN) 10 mg tablet, [LORATADINE (CLARITIN) 10 MG TABLET] Take 10 mg by mouth daily., Disp: , Rfl:      VENTOLIN HFA 90 mcg/actuation inhaler, [VENTOLIN HFA 90 MCG/ACTUATION INHALER] INHALE 2 PUFFS BY MOUTH EVERY 6 HOURS AS NEEDED FOR WHEEZING OR SHORTNESS OF BREATH, Disp: 18 g, Rfl: 4    Family History   Problem Relation Age of Onset     No Known Problems Mother      Heart Disease Father      Sleep Apnea Sister      Snoring Sister      Heart Disease Sister      No Known Problems Daughter      No Known Problems Maternal Grandmother      No Known Problems Maternal Grandfather      No Known Problems Paternal Grandmother      No Known Problems Paternal Grandfather      No Known Problems Maternal Aunt      No Known Problems Paternal Aunt      Hereditary Breast and Ovarian Cancer Syndrome No family hx of      Breast Cancer No family hx of      Cancer No family hx of      Colon Cancer No family hx of      Endometrial Cancer No family hx of      Ovarian Cancer No family hx of        Social History     Socioeconomic History     Marital status:      Spouse name: Not on file     Number of children: Not on file     Years of education: Not on file     Highest education level: Not on file   Occupational History     Not on file   Tobacco Use     Smoking status: Never Smoker     Smokeless tobacco: Never Used   Substance and Sexual Activity     Alcohol use: Yes     Alcohol/week: 6.0 standard drinks     Comment: Alcoholic Drinks/day: hard cider     Drug use: Not Currently     Types: Marijuana     Comment: Drug use: very rare     Sexual activity: Not Currently   Other Topics Concern     Not on file   Social History Narrative     Not on file     Social Determinants of Health     Financial Resource Strain:      Difficulty of Paying Living Expenses:    Food Insecurity:      Worried About Running Out of Food in the Last Year:      Ran Out of Food  "in the Last Year:    Transportation Needs:      Lack of Transportation (Medical):      Lack of Transportation (Non-Medical):    Physical Activity:      Days of Exercise per Week:      Minutes of Exercise per Session:    Stress:      Feeling of Stress :    Social Connections:      Frequency of Communication with Friends and Family:      Frequency of Social Gatherings with Friends and Family:      Attends Muslim Services:      Active Member of Clubs or Organizations:      Attends Club or Organization Meetings:      Marital Status:    Intimate Partner Violence:      Fear of Current or Ex-Partner:      Emotionally Abused:      Physically Abused:      Sexually Abused:        10 point Review of Systems is negative      Pulse 104   Ht 1.575 m (5' 2\")   Wt 64.9 kg (143 lb)   SpO2 96%   BMI 26.16 kg/m      BMI= Body mass index is 26.16 kg/m .    OBJECTIVE:  General appearance: Patient is alert and fully cooperative with history & exam.  No sign of distress is noted during the visit.  Vascular: Dorsalis pedis and posterior tibial pulses are palpable. There is no pedal hair growth bilaterally.  CFT < 3 sec from anterior tibial surface to distal digits bilaterally. There is no appreciable edema noted.  Dermatologic: Turgor and texture are within normal limits. No coloration or temperature changes. No primary or secondary lesions noted.  Neurologic: All epicritic and proprioceptive sensations are grossly intact bilaterally.  Musculoskeletal: All active and passive ankle, subtalar, midtarsal joint range of motion are grossly intact without pain or crepitus, with the exception of the first MPJ right foot. Manual muscle strength is within normal limits bilaterally. All dorsiflexors, plantarflexors, invertors, evertors are intact bilaterally. Tenderness present to the plantar medial aspect of both heels and the first MPJ right foot on palpation. Tenderness to the first MPJ right foot with range of motion.  There is a large " firm palpable subcutaneous mass on the medial aspect of the head of the first metatarsal right foot.  Calf is soft/non-tender without warmth/induration    Imaging:         DX Hip/Pelvis/Spine    Result Date: 8/13/2021  DX HIP/PELVIS/SPINE  7/23/2021 8:51 AM HISTORY:  Senile osteoporosis COMPARISON: 8/16/2018, 6/21/2017, 6/14/2016, 6/4/2015, and multiple priors dating back to 10/26/2006. FINDINGS: This DEXA scan was performed using a [employer] scanner. DEXA results are reported according to T-score.  The T-score is the standard deviation from the peak bone mass in a normal young adult population.  In accordance with the ISCD (International Society of Clinical Densitometry), the lower of the total proximal femur vs femoral neck T-score is reported.  Osteopenia is defined as a T-score of -1.0 to -2.5.  Osteoporosis is defined as a T-score of less than -2.5. T-SCORES: Lumbar Spine L1-L2 T-score: -2.3 Left Hip (Neck) T-score: -1.1 Right Hip (Neck) T-score: -1.4 Hip lowest neck BMD: 0.837 gm/cm2. Left radius 33% T score: 0.3 PERCENT CHANGE since 2018: Lumbar Spine: Decreased significantly by 5.7% Femurs: Not significantly changed, +1.9% Radius: Not significantly changed. +0.4%. FRAX 10-YEAR PROBABILITY OF FRACTURE*: Major Osteoporotic: 13.3% Hip: 2.7% *All treatment decisions require clinical judgment and consideration of individual patient factors which may not be captured in the FRAX model and the risk of fracture may be over- or under-estimated by FRAX.     IMPRESSION: OSTEOPENIA, severe in the lumbar spine, mild in the bilateral femurs. Since 2018, bone mineral density is statistically significantly decreased in the lumbar spine, but not significantly changed in the femurs or left radius. ANNETTE SMITH MD   SYSTEM ID:  [id number]           TREATMENT:  An x-ray of the right foot was taken today.  I informed the patient she will require surgical correction of her painful bunion deformity of the right foot.  I  recommended a Lapidus bunionectomy right foot.    Also informed the patient she would benefit from a plantar fasciotomy of bilateral feet to assist with her chronic heel pain.  The patient stated she would contemplate having surgical correction later date.  The patient was given a cortisone injection in both heels today each consisting of 1 cc of dexamethasone sodium phosphate and 1 cc of 2% lidocaine plain.  She is to return to the clinic as needed.          Alfonzo Haney; BOOKER  [facility] Foot & Ankle Surgery/Podiatry

## 2021-08-18 NOTE — PROGRESS NOTES
(M81.0) Osteoporosis Senile  (primary encounter diagnosis)    I would recommend scheduling Reclast infusion this year again. BMP will be checked before infusion. Repeat DXA in 2 years. If continues to have decline in the bone density and T-score < -2.5 and new fragility fracture, alternative treatment options can be discussed.    Eczema treatment at home discussed and possibility of using Prolia again in the future.        This note has been dictated using voice recognition software. Any grammatical or context distortions are unintentional and inherent to the software.      Return in about 2 years (around 8/17/2023) for osteoporosis.    Patient Instructions   Please schedule Reclast infusion.   Repeat DXA scan in 2 years.      Treatment Options discussed:   Bisphosphonates    IV - Zoledronic acid (Reclast) once a year for 3 years total         -discussed studies have shown that three years of zolendronic acid protects forthe following 3 years as much as being on zolendronic acid for 6 years straight           Flu-like symptoms can occur within the first 24-72 hours of your first infusion of the IVbisphosphonate. This includes low-grade fever, muscle and joint pains. Ibuprofen and/or Tylenol can help these symptoms. Low calcium levels can also occur with the IV bisphosphonates.     Osteonecrosis of the jaw(ONJ) has been rarely associated with bisphosphonate use for osteoporosis.The risk is approximately 1/1700-1/100,000, with development most likely related to invasive dental procedures (extractions, dental implants) and poordental hygiene. Be sure to continue regular dental visits and alert me and/or your dentist for any issues. If you do require invasive dental work, please contact me and we will stop the medication 3 months prior.      -The data available at this time suggests that there is probably a small increase risk of atypical (nontraumatic) subtrochanteric fractures of the femur in patients on  bisphosphonate therapy compared to those not onit. One large study suggested that for every 100 fractures prevented with bisphosphonate therapy, less than one femur fracture will occur. Other studies suggest one episode per 2,500 patient years. Patient should call withleg pain.     -Calcium: total calcium intake should be 1200mg per day from dietary sources. If not achieved with diet alone, add in calcium tablet(s). No more than 500mg at one time. Dietary sources: 8 ounces of milk,4 ounces of yogurt or 1 ounce of cheese contain about 300mg calcium per serving, green veggies, almonds, beans, oranges, along with various other plant sources.     -Vitamin D3 recommendations: 2000 IU daily.    -Bone Stimulating Exercise: impact and weight-bearing exercise like walking, jogging, yoga, Manjinder-Chi, stair-climbing, dancing, dry aerobics, and tennis 3-5 times per week for at least 30 minutes & weight-lifting orresistance training 2 times per week, may improve your bone desity and increase your strength, thereby decreasing your risk of fracture.    -Dr. Aisha Cm has a book on Yoga for Osteoporosis and a 12 pose regimenyou can look up online.     -Fall Prevention: avoiding ice, use of [employer]acks http://www.[employer]/ to cover shoes in the winter, avoiding loose gravel and uneven terrain, clearing house of cords, throw rugs,avoiding ladders, using caution with stairs and around dogs and small children.           Subjective:    Nicole Marcial is a 76 year old female  here for follow up.  Last visit was in 8/2018. She had Reclast infusion in 2017 and 2018.She tolerated Reclast infusion very well.  She was treated with Prolia for a few years but developed eczema after few shots and decided to stop. She still has eczema on and off, so now not sure if the Prolia was the cause.  She had DXA done on 7/23/21:  Lumbar Spine L1-L2 T-score: -2.3     Left Hip (Neck) T-score: -1.1  Right Hip (Neck) T-score: -1.4     Hip lowest neck  BMD: 0.837 gm/cm2.     Left radius 33% T score: 0.3    PERCENT CHANGE since 2018:  Lumbar Spine: Decreased significantly by 5.7%  Femurs: Not significantly changed, +1.9%  Radius: Not significantly changed. +0.4%.  FRAX 10-YEAR PROBABILITY OF FRACTURE*:  Major Osteoporotic: 13.3%  Hip: 2.7%    We reviewed the labs:  Component      Latest Ref Rng & Units 5/10/2021   Vitamin D, Total (25-Hydroxy)      30.0 - 80.0 ng/mL 51.5     Component      Latest Ref Rng & Units 5/10/2021   Sodium      136 - 145 mmol/L 141   Potassium      3.5 - 5.0 mmol/L 4.2   Chloride      98 - 107 mmol/L 107   Carbon Dioxide      22 - 31 mmol/L 24   Anion Gap      5 - 18 mmol/L 10   Glucose      70 - 125 mg/dL 95   Calcium      8.5 - 10.5 mg/dL 9.1   Urea Nitrogen      8 - 28 mg/dL 15   Creatinine      0.60 - 1.10 mg/dL 0.94   GFR Estimate If Black      >60 mL/min/1.73m2 >60   GFR Estimate      >60 mL/min/1.73m2 58 (L)     Social History     Socioeconomic History     Marital status:      Spouse name: Not on file     Number of children: Not on file     Years of education: Not on file     Highest education level: Not on file   Occupational History     Not on file   Tobacco Use     Smoking status: Never Smoker     Smokeless tobacco: Never Used   Substance and Sexual Activity     Alcohol use: Yes     Alcohol/week: 6.0 standard drinks     Comment: Alcoholic Drinks/day: hard cider     Drug use: Not Currently     Types: Marijuana     Comment: Drug use: very rare     Sexual activity: Not Currently   Other Topics Concern     Not on file   Social History Narrative     Not on file     Social Determinants of Health     Financial Resource Strain:      Difficulty of Paying Living Expenses:    Food Insecurity:      Worried About Running Out of Food in the Last Year:      Ran Out of Food in the Last Year:    Transportation Needs:      Lack of Transportation (Medical):      Lack of Transportation (Non-Medical):    Physical Activity:      Days of Exercise  per Week:      Minutes of Exercise per Session:    Stress:      Feeling of Stress :    Social Connections:      Frequency of Communication with Friends and Family:      Frequency of Social Gatherings with Friends and Family:      Attends Gnosticist Services:      Active Member of Clubs or Organizations:      Attends Club or Organization Meetings:      Marital Status:    Intimate Partner Violence:      Fear of Current or Ex-Partner:      Emotionally Abused:      Physically Abused:      Sexually Abused:        Family History   Problem Relation Age of Onset     No Known Problems Mother      Heart Disease Father      Sleep Apnea Sister      Snoring Sister      Heart Disease Sister      No Known Problems Daughter      No Known Problems Maternal Grandmother      No Known Problems Maternal Grandfather      No Known Problems Paternal Grandmother      No Known Problems Paternal Grandfather      No Known Problems Maternal Aunt      No Known Problems Paternal Aunt      Hereditary Breast and Ovarian Cancer Syndrome No family hx of      Breast Cancer No family hx of      Cancer No family hx of      Colon Cancer No family hx of      Endometrial Cancer No family hx of      Ovarian Cancer No family hx of      Review of Systems:  A 12 point comprehensive review of systems was negative except as noted in HPI.        Objective:    Physical Exam   /72   Pulse 84   Wt 64.9 kg (143 lb)   Breastfeeding No   BMI 27.02 kg/m    Body mass index is 27.02 kg/m .    Constitutional: oriented to person, place, and time, appears well-nourished. No distress.         Patient Active Problem List   Diagnosis     Hypertension     Lower Back Pain     Herpes Simplex Type I     Esophageal Reflux     Renal Cyst     Fibromyalgia     Taking Female Hormones For Postmenopausal HRT     Allergic Rhinitis     Lumbar radiculopathy     Dermatitis     Diarrhea     Serum Enzyme Levels - Lipase Elevated     Osteoporosis Senile     Herniated Disc (L4 - L5)  Right     Mixed hyperlipidemia     Selective deficiency of IgG (H)     Arthralgias In Multiple Sites     Temperature Intolerance To Cold   (Consistent)     Chronic Pain     Drug allergy     Guerrero's neuroma of left foot     Anxiety     Primary insomnia     Recurrent cold sores     Mild intermittent asthma without complication     Chronic kidney disease, stage 3       Current Outpatient Medications   Medication     acyclovir (ZOVIRAX) 5 % ointment     amitriptyline (ELAVIL) 25 MG tablet     amLODIPine (NORVASC) 5 MG tablet     busPIRone (BUSPAR) 15 MG tablet     cholecalciferol, vitamin D3, 1,000 unit tablet     estradiol-norethindrone (ACTIVELLA) 1-0.5 mg per tablet     famotidine (FOR PEPCID) 10 MG tablet     famotidine-calcium carbonate-magnesium hydroxide (PEPCID COMPLETE) -165 mg Chew     loratadine (CLARITIN) 10 mg tablet     VENTOLIN HFA 90 mcg/actuation inhaler     No current facility-administered medications for this visit.               Viry Chau MD  8/17/2021

## 2021-08-18 NOTE — LETTER
8/18/2021         RE: Nicole Marcial  [id number] [id number]        Dear Colleague,    Thank you for referring your patient, Nicole Marcial, to the [facility]. Please see a copy of my visit note below.    FOOT AND ANKLE SURGERY/PODIATRY Progress Note        ASSESSMENT:   Hallux abductovalgus right foot  Plantar fasciitis bilateral feet    HPI: Nicole Marcial was seen again today presented to the clinic today complaining of a painful bunion right foot.  The patient indicated that she has had this bunion for several years.  It has become a bit more painful over the past several months.  She has an aching pain in the big toe joint which can be aggravated by weightbearing and ambulation.  She denies trauma to her foot.  She has not had any associated redness or swelling.  Her pain is moderate to severe.  Her pain is relieved with nonweightbearing.  The patient also continues to complain of chronic bilateral heel pain.  She has been previously diagnosed with plantar fasciitis.  She has had 2 cortisone injection in the right heel.  The left heel pain has just recently started and she has not had it treated.  She has post static dyskinesia.  She has never had any associated redness or swelling surrounding her heels..      Past Medical History:   Diagnosis Date     Back pain      Chronic pain      Eczema      Fibromyalgia      Hypertension      Mild asthma      Nerve pain      Osteoporosis         Past Surgical History:   Procedure Laterality Date     BACK SURGERY      x3     BUNIONECTOMY       OTHER SURGICAL HISTORY N/A 08/16/2016    parathyroidectomyDr. Tejon     RELEASE TRIGGER FINGER       SHOULDER SURGERY       SINUS SURGERY       TONSILLECTOMY         Allergies   Allergen Reactions     Adhesive [Mecrylate] Unknown     Contact allergy     Aspirin (Tartrazine Only) [Tartrazine] Unknown     Abdominal pain       Bee Venom Unknown     Cefuroxime Axetil  [Cefuroxime] Unknown     Rash     Cephalosporins Unknown     Rash--ceftin     Chloral Hydrate Analogues [Chloral Hydrate] Unknown     Contact allergy     Chlorhexidine Gluconate [Chlorhexidine] Unknown     Contact allergy     Clindamycin Unknown     C diff     Codeine Unknown     Dairy [Lac Bovis] Unknown     Doxepin Unknown     hyper     Ergot Alkaloids [Ergoloid Mesylates] Unknown     Erythromycin Base [Erythromycin] Unknown     Stomach pain     Gluten [Gluten Meal] Unknown     Celiac panel not positive, patient states has abdominal pain when she eats gluten     Hydroxyzine Unknown     Latex Unknown     Morphine Unknown     Not effective     Nickel Unknown     Nortriptyline Unknown     hyper     Pregabalin Unknown     Pseudoephedrine Unknown     Terconazole Unknown     Tramadol Unknown     Trazodone Nausea and Vomiting     Venlafaxine Unknown     Vistaril [Hydroxyzine] Unknown     Metronidazole Rash     Rash and neuropathy         Current Outpatient Medications:      acyclovir (ZOVIRAX) 5 % ointment, [ACYCLOVIR (ZOVIRAX) 5 % OINTMENT] Apply up to 5 times daily topically, Disp: 15 g, Rfl: 1     amitriptyline (ELAVIL) 25 MG tablet, [AMITRIPTYLINE (ELAVIL) 25 MG TABLET] Take 2 tablets at bedtime. May increase to 3 tablets at bedtime if tolerated., Disp: 270 tablet, Rfl: 3     amLODIPine (NORVASC) 5 MG tablet, [AMLODIPINE (NORVASC) 5 MG TABLET] TAKE ONE TABLET BY MOUTH EVERY DAY, Disp: 90 tablet, Rfl: 3     busPIRone (BUSPAR) 15 MG tablet, [BUSPIRONE (BUSPAR) 15 MG TABLET] TAKE 1 & 1/2 TABLETS BY MOUTH 2 TIMES DAILY., Disp: 270 tablet, Rfl: 3     cholecalciferol, vitamin D3, 1,000 unit tablet, [CHOLECALCIFEROL, VITAMIN D3, 1,000 UNIT TABLET] Take 2,000 Units by mouth daily. , Disp: , Rfl:      estradiol-norethindrone (ACTIVELLA) 1-0.5 mg per tablet, [ESTRADIOL-NORETHINDRONE (ACTIVELLA) 1-0.5 MG PER TABLET] Take 1 tablet by mouth daily., Disp: 84 tablet, Rfl: 3     famotidine (FOR PEPCID) 10 MG tablet, [FAMOTIDINE (FOR  PEPCID) 10 MG TABLET] Take 20 mg by mouth daily. Takes two at night , and one in the morning , Disp: , Rfl:      famotidine-calcium carbonate-magnesium hydroxide (PEPCID COMPLETE) -165 mg Chew, [FAMOTIDINE-CALCIUM CARBONATE-MAGNESIUM HYDROXIDE (PEPCID COMPLETE) -165 MG CHEW] Chew 1 tablet daily as needed., Disp: , Rfl:      loratadine (CLARITIN) 10 mg tablet, [LORATADINE (CLARITIN) 10 MG TABLET] Take 10 mg by mouth daily., Disp: , Rfl:      VENTOLIN HFA 90 mcg/actuation inhaler, [VENTOLIN HFA 90 MCG/ACTUATION INHALER] INHALE 2 PUFFS BY MOUTH EVERY 6 HOURS AS NEEDED FOR WHEEZING OR SHORTNESS OF BREATH, Disp: 18 g, Rfl: 4    Family History   Problem Relation Age of Onset     No Known Problems Mother      Heart Disease Father      Sleep Apnea Sister      Snoring Sister      Heart Disease Sister      No Known Problems Daughter      No Known Problems Maternal Grandmother      No Known Problems Maternal Grandfather      No Known Problems Paternal Grandmother      No Known Problems Paternal Grandfather      No Known Problems Maternal Aunt      No Known Problems Paternal Aunt      Hereditary Breast and Ovarian Cancer Syndrome No family hx of      Breast Cancer No family hx of      Cancer No family hx of      Colon Cancer No family hx of      Endometrial Cancer No family hx of      Ovarian Cancer No family hx of        Social History     Socioeconomic History     Marital status:      Spouse name: Not on file     Number of children: Not on file     Years of education: Not on file     Highest education level: Not on file   Occupational History     Not on file   Tobacco Use     Smoking status: Never Smoker     Smokeless tobacco: Never Used   Substance and Sexual Activity     Alcohol use: Yes     Alcohol/week: 6.0 standard drinks     Comment: Alcoholic Drinks/day: hard cider     Drug use: Not Currently     Types: Marijuana     Comment: Drug use: very rare     Sexual activity: Not Currently   Other Topics  "Concern     Not on file   Social History Narrative     Not on file     Social Determinants of Health     Financial Resource Strain:      Difficulty of Paying Living Expenses:    Food Insecurity:      Worried About Running Out of Food in the Last Year:      Ran Out of Food in the Last Year:    Transportation Needs:      Lack of Transportation (Medical):      Lack of Transportation (Non-Medical):    Physical Activity:      Days of Exercise per Week:      Minutes of Exercise per Session:    Stress:      Feeling of Stress :    Social Connections:      Frequency of Communication with Friends and Family:      Frequency of Social Gatherings with Friends and Family:      Attends [employer] Services:      Active Member of Clubs or Organizations:      Attends Club or Organization Meetings:      Marital Status:    Intimate Partner Violence:      Fear of Current or Ex-Partner:      Emotionally Abused:      Physically Abused:      Sexually Abused:        10 point Review of Systems is negative      Pulse 104   Ht 1.575 m (5' 2\")   Wt 64.9 kg (143 lb)   SpO2 96%   BMI 26.16 kg/m      BMI= Body mass index is 26.16 kg/m .    OBJECTIVE:  General appearance: Patient is alert and fully cooperative with history & exam.  No sign of distress is noted during the visit.  Vascular: Dorsalis pedis and posterior tibial pulses are palpable. There is no pedal hair growth bilaterally.  CFT < 3 sec from anterior tibial surface to distal digits bilaterally. There is no appreciable edema noted.  Dermatologic: Turgor and texture are within normal limits. No coloration or temperature changes. No primary or secondary lesions noted.  Neurologic: All epicritic and proprioceptive sensations are grossly intact bilaterally.  Musculoskeletal: All active and passive ankle, subtalar, midtarsal joint range of motion are grossly intact without pain or crepitus, with the exception of the first MPJ right foot. Manual muscle strength is within normal limits " bilaterally. All dorsiflexors, plantarflexors, invertors, evertors are intact bilaterally. Tenderness present to the plantar medial aspect of both heels and the first MPJ right foot on palpation. Tenderness to the first MPJ right foot with range of motion.  There is a large firm palpable subcutaneous mass on the medial aspect of the head of the first metatarsal right foot.  Calf is soft/non-tender without warmth/induration    Imaging:         DX Hip/Pelvis/Spine    Result Date: 8/13/2021  DX HIP/PELVIS/SPINE  7/23/2021 8:51 AM HISTORY:  Senile osteoporosis COMPARISON: 8/16/2018, 6/21/2017, 6/14/2016, 6/4/2015, and multiple priors dating back to 10/26/2006. FINDINGS: This DEXA scan was performed using a [employer] scanner. DEXA results are reported according to T-score.  The T-score is the standard deviation from the peak bone mass in a normal young adult population.  In accordance with the ISCD (International Society of Clinical Densitometry), the lower of the total proximal femur vs femoral neck T-score is reported.  Osteopenia is defined as a T-score of -1.0 to -2.5.  Osteoporosis is defined as a T-score of less than -2.5. T-SCORES: Lumbar Spine L1-L2 T-score: -2.3 Left Hip (Neck) T-score: -1.1 Right Hip (Neck) T-score: -1.4 Hip lowest neck BMD: 0.837 gm/cm2. Left radius 33% T score: 0.3 PERCENT CHANGE since 2018: Lumbar Spine: Decreased significantly by 5.7% Femurs: Not significantly changed, +1.9% Radius: Not significantly changed. +0.4%. FRAX 10-YEAR PROBABILITY OF FRACTURE*: Major Osteoporotic: 13.3% Hip: 2.7% *All treatment decisions require clinical judgment and consideration of individual patient factors which may not be captured in the FRAX model and the risk of fracture may be over- or under-estimated by FRAX.     IMPRESSION: OSTEOPENIA, severe in the lumbar spine, mild in the bilateral femurs. Since 2018, bone mineral density is statistically significantly decreased in the lumbar spine, but not  significantly changed in the femurs or left radius. ANNETTE SMITH MD   SYSTEM ID:  [id number]           TREATMENT:  An x-ray of the right foot was taken today.  I informed the patient she will require surgical correction of her painful bunion deformity of the right foot.  I recommended a bunionectomy with a first metatarsal osteotomy with internal screw fixation.  Also informed the patient she would benefit from a plantar fasciotomy of bilateral feet to assist with her chronic heel pain.  The patient stated she would contemplate having surgical correction later date.  The patient was given a cortisone injection in both heels today each consisting of 1 cc of dexamethasone sodium phosphate and 1 cc of 2% lidocaine plain.  She is to return to the clinic as needed.  A steveion        Alfonzo Beasley DPM  [facility] Foot & Ankle Surgery/Podiatry         Again, thank you for allowing me to participate in the care of your patient.        Sincerely,        Alfonzo Haney DPM

## 2021-08-31 ENCOUNTER — INFUSION THERAPY VISIT (OUTPATIENT)
Dept: INFUSION THERAPY | Facility: CLINIC | Age: 76
End: 2021-08-31
Payer: COMMERCIAL

## 2021-08-31 DIAGNOSIS — M81.0 SENILE OSTEOPOROSIS: ICD-10-CM

## 2021-08-31 DIAGNOSIS — M81.0 SENILE OSTEOPOROSIS: Primary | ICD-10-CM

## 2021-08-31 PROCEDURE — 250N000011 HC RX IP 250 OP 636: Performed by: INTERNAL MEDICINE

## 2021-08-31 PROCEDURE — 96365 THER/PROPH/DIAG IV INF INIT: CPT

## 2021-08-31 RX ORDER — ALBUTEROL SULFATE 90 UG/1
1-2 AEROSOL, METERED RESPIRATORY (INHALATION)
Status: CANCELLED
Start: 2021-08-31

## 2021-08-31 RX ORDER — DIPHENHYDRAMINE HYDROCHLORIDE 50 MG/ML
50 INJECTION INTRAMUSCULAR; INTRAVENOUS
Status: CANCELLED
Start: 2021-08-31

## 2021-08-31 RX ORDER — DIPHENHYDRAMINE HYDROCHLORIDE 50 MG/ML
50 INJECTION INTRAMUSCULAR; INTRAVENOUS
Status: DISCONTINUED | OUTPATIENT
Start: 2021-08-31 | End: 2021-08-31 | Stop reason: HOSPADM

## 2021-08-31 RX ORDER — ZOLEDRONIC ACID 5 MG/100ML
5 INJECTION, SOLUTION INTRAVENOUS ONCE
Status: COMPLETED | OUTPATIENT
Start: 2021-08-31 | End: 2021-08-31

## 2021-08-31 RX ORDER — ZOLEDRONIC ACID 5 MG/100ML
5 INJECTION, SOLUTION INTRAVENOUS ONCE
Status: CANCELLED
Start: 2021-08-31 | End: 2021-08-31

## 2021-08-31 RX ORDER — MEPERIDINE HYDROCHLORIDE 50 MG/ML
25 INJECTION INTRAMUSCULAR; INTRAVENOUS; SUBCUTANEOUS EVERY 30 MIN PRN
Status: DISCONTINUED | OUTPATIENT
Start: 2021-08-31 | End: 2021-08-31 | Stop reason: HOSPADM

## 2021-08-31 RX ORDER — ALBUTEROL SULFATE 0.83 MG/ML
2.5 SOLUTION RESPIRATORY (INHALATION)
Status: DISCONTINUED | OUTPATIENT
Start: 2021-08-31 | End: 2021-08-31 | Stop reason: HOSPADM

## 2021-08-31 RX ORDER — HEPARIN SODIUM,PORCINE 10 UNIT/ML
5 VIAL (ML) INTRAVENOUS
Status: CANCELLED | OUTPATIENT
Start: 2021-08-31

## 2021-08-31 RX ORDER — EPINEPHRINE 1 MG/ML
0.3 INJECTION, SOLUTION INTRAMUSCULAR; SUBCUTANEOUS EVERY 5 MIN PRN
Status: CANCELLED | OUTPATIENT
Start: 2021-08-31

## 2021-08-31 RX ORDER — METHYLPREDNISOLONE SODIUM SUCCINATE 125 MG/2ML
125 INJECTION, POWDER, LYOPHILIZED, FOR SOLUTION INTRAMUSCULAR; INTRAVENOUS
Status: DISCONTINUED | OUTPATIENT
Start: 2021-08-31 | End: 2021-08-31 | Stop reason: HOSPADM

## 2021-08-31 RX ORDER — NALOXONE HYDROCHLORIDE 0.4 MG/ML
0.2 INJECTION, SOLUTION INTRAMUSCULAR; INTRAVENOUS; SUBCUTANEOUS
Status: DISCONTINUED | OUTPATIENT
Start: 2021-08-31 | End: 2021-08-31 | Stop reason: HOSPADM

## 2021-08-31 RX ORDER — MEPERIDINE HYDROCHLORIDE 50 MG/ML
25 INJECTION INTRAMUSCULAR; INTRAVENOUS; SUBCUTANEOUS EVERY 30 MIN PRN
Status: CANCELLED | OUTPATIENT
Start: 2021-08-31

## 2021-08-31 RX ORDER — NALOXONE HYDROCHLORIDE 0.4 MG/ML
0.2 INJECTION, SOLUTION INTRAMUSCULAR; INTRAVENOUS; SUBCUTANEOUS
Status: CANCELLED | OUTPATIENT
Start: 2021-08-31

## 2021-08-31 RX ORDER — ALBUTEROL SULFATE 90 UG/1
1-2 AEROSOL, METERED RESPIRATORY (INHALATION)
Status: DISCONTINUED | OUTPATIENT
Start: 2021-08-31 | End: 2021-08-31 | Stop reason: HOSPADM

## 2021-08-31 RX ORDER — HEPARIN SODIUM (PORCINE) LOCK FLUSH IV SOLN 100 UNIT/ML 100 UNIT/ML
5 SOLUTION INTRAVENOUS
Status: CANCELLED | OUTPATIENT
Start: 2021-08-31

## 2021-08-31 RX ORDER — ALBUTEROL SULFATE 0.83 MG/ML
2.5 SOLUTION RESPIRATORY (INHALATION)
Status: CANCELLED | OUTPATIENT
Start: 2021-08-31

## 2021-08-31 RX ORDER — EPINEPHRINE 1 MG/ML
0.3 INJECTION, SOLUTION INTRAMUSCULAR; SUBCUTANEOUS EVERY 5 MIN PRN
Status: DISCONTINUED | OUTPATIENT
Start: 2021-08-31 | End: 2021-08-31 | Stop reason: HOSPADM

## 2021-08-31 RX ORDER — METHYLPREDNISOLONE SODIUM SUCCINATE 125 MG/2ML
125 INJECTION, POWDER, LYOPHILIZED, FOR SOLUTION INTRAMUSCULAR; INTRAVENOUS
Status: CANCELLED
Start: 2021-08-31

## 2021-08-31 RX ADMIN — ZOLEDRONIC ACID 5 MG: 5 INJECTION, SOLUTION INTRAVENOUS at 13:48

## 2021-08-31 NOTE — PROGRESS NOTES
Infusion Nursing Note:  Nicole Marcial presents today for Reclast.    Patient seen by provider today: No   present during visit today: Not Applicable.    Note: Patient received reclast in the past and tolerated well. She has no questions about her treatment plan today.      Intravenous Access:  Peripheral IV placed per [facility].    Treatment Conditions:  Results reviewed, labs MET treatment parameters, ok to proceed with treatment.      Post Infusion Assessment:  Patient tolerated infusion without incident.  Site patent and intact, free from redness, edema or discomfort.  Access discontinued per protocol.       Discharge Plan:   Patient discharged in stable condition accompanied by: self.  Departure Mode: Ambulatory.      Hannah Barry RN

## 2021-09-09 DIAGNOSIS — F41.9 ANXIETY: ICD-10-CM

## 2021-09-10 RX ORDER — BUSPIRONE HYDROCHLORIDE 15 MG/1
TABLET ORAL
Qty: 270 TABLET | Refills: 1 | Status: SHIPPED | OUTPATIENT
Start: 2021-09-10 | End: 2022-10-21

## 2021-09-10 NOTE — TELEPHONE ENCOUNTER
"Last Written Prescription Date:  6/29/20  Last Fill Quantity: 270,  # refills: 3   Last office visit provider:  5/10/21     Requested Prescriptions   Pending Prescriptions Disp Refills     busPIRone (BUSPAR) 15 MG tablet [Pharmacy Med Name: BUSPIRONE HCL 15MG TABS] 270 tablet 1     Sig: TAKE 1 AND 1/2 TABLETS BY MOUTH TWO TIMES A DAY       Atypical Antidepressants Protocol Passed - 9/9/2021  4:30 AM        Passed - Recent (12 mo) or future (30 days) visit within the authorizing provider's specialty     Patient has had an office visit with the authorizing provider or a provider within the authorizing providers department within the previous 12 mos or has a future within next 30 days. See \"Patient Info\" tab in inbasket, or \"Choose Columns\" in Meds & Orders section of the refill encounter.              Passed - Medication active on med list        Passed - Patient is age 18 or older        Passed - No active pregnancy on record        Passed - No positive pregnancy test in past 12 mos             fernando hawkins RN 09/09/21 7:29 PM  "

## 2021-10-04 ENCOUNTER — HEALTH MAINTENANCE LETTER (OUTPATIENT)
Age: 76
End: 2021-10-04

## 2021-10-27 ENCOUNTER — OFFICE VISIT (OUTPATIENT)
Dept: FAMILY MEDICINE | Facility: CLINIC | Age: 76
End: 2021-10-27
Payer: COMMERCIAL

## 2021-10-27 VITALS
WEIGHT: 146.4 LBS | HEART RATE: 124 BPM | SYSTOLIC BLOOD PRESSURE: 124 MMHG | BODY MASS INDEX: 27.64 KG/M2 | DIASTOLIC BLOOD PRESSURE: 64 MMHG | HEIGHT: 61 IN | OXYGEN SATURATION: 98 %

## 2021-10-27 DIAGNOSIS — Z79.890 HORMONE REPLACEMENT THERAPY: ICD-10-CM

## 2021-10-27 DIAGNOSIS — M79.7 FIBROMYALGIA: ICD-10-CM

## 2021-10-27 DIAGNOSIS — M54.50 CHRONIC LOW BACK PAIN, UNSPECIFIED BACK PAIN LATERALITY, UNSPECIFIED WHETHER SCIATICA PRESENT: ICD-10-CM

## 2021-10-27 DIAGNOSIS — N18.30 STAGE 3 CHRONIC KIDNEY DISEASE, UNSPECIFIED WHETHER STAGE 3A OR 3B CKD (H): ICD-10-CM

## 2021-10-27 DIAGNOSIS — F51.01 PRIMARY INSOMNIA: ICD-10-CM

## 2021-10-27 DIAGNOSIS — I10 ESSENTIAL HYPERTENSION: ICD-10-CM

## 2021-10-27 DIAGNOSIS — F41.9 ANXIETY: ICD-10-CM

## 2021-10-27 DIAGNOSIS — D80.3 SELECTIVE DEFICIENCY OF IGG (H): ICD-10-CM

## 2021-10-27 DIAGNOSIS — M81.0 SENILE OSTEOPOROSIS: ICD-10-CM

## 2021-10-27 DIAGNOSIS — J45.20 MILD INTERMITTENT ASTHMA WITHOUT COMPLICATION: ICD-10-CM

## 2021-10-27 DIAGNOSIS — B00.1 RECURRENT COLD SORES: ICD-10-CM

## 2021-10-27 DIAGNOSIS — E78.2 MIXED HYPERLIPIDEMIA: ICD-10-CM

## 2021-10-27 DIAGNOSIS — Z00.00 ENCOUNTER FOR ANNUAL WELLNESS VISIT (AWV) IN MEDICARE PATIENT: Primary | ICD-10-CM

## 2021-10-27 DIAGNOSIS — M25.50 POLYARTHRALGIA: ICD-10-CM

## 2021-10-27 DIAGNOSIS — G89.29 CHRONIC LOW BACK PAIN, UNSPECIFIED BACK PAIN LATERALITY, UNSPECIFIED WHETHER SCIATICA PRESENT: ICD-10-CM

## 2021-10-27 PROCEDURE — 99213 OFFICE O/P EST LOW 20 MIN: CPT | Mod: 25 | Performed by: FAMILY MEDICINE

## 2021-10-27 PROCEDURE — 99397 PER PM REEVAL EST PAT 65+ YR: CPT | Performed by: FAMILY MEDICINE

## 2021-10-27 RX ORDER — ALBUTEROL SULFATE 90 UG/1
AEROSOL, METERED RESPIRATORY (INHALATION)
Qty: 18 G | Refills: 4 | Status: SHIPPED | OUTPATIENT
Start: 2021-10-27 | End: 2022-05-09

## 2021-10-27 RX ORDER — HYDROCODONE BITARTRATE AND ACETAMINOPHEN 5; 325 MG/1; MG/1
1 TABLET ORAL EVERY 6 HOURS PRN
Qty: 10 TABLET | Refills: 0 | Status: SHIPPED | OUTPATIENT
Start: 2021-10-27 | End: 2021-10-30

## 2021-10-27 ASSESSMENT — ENCOUNTER SYMPTOMS: BACK PAIN: 1

## 2021-10-27 ASSESSMENT — ACTIVITIES OF DAILY LIVING (ADL): CURRENT_FUNCTION: NO ASSISTANCE NEEDED

## 2021-10-27 ASSESSMENT — MIFFLIN-ST. JEOR: SCORE: 1092.48

## 2021-10-27 NOTE — PROGRESS NOTES
"SUBJECTIVE:   Nicole Marcial is a 76 year old female who presents for Preventive Visit.  We reviewed her health history.  She has history of chronic lower back pain, lumbar radiculopathy, fibromyalgia as well as anxiety, depression, insomnia, osteoporosis, GERD, hypertension, hyperlipidemia, intermittent asthma, allergic rhinitis, cold sores as well as stage III chronic kidney disease.  We reviewed and updated her medications and allergies.  She is currently doing well.  Plans to go to [place] in about 2 weeks and will return to [place] in the spring.  She has been off of hormone replacement therapy for about a month.  She is experiencing increased hot flashes as well as dry eyes and mouth.  She feels that these symptoms are quite bothersome and she would like to discuss going back on hormone replacement.  She is aware of the risks associated with hormone replacement.  She is wondering about possibly getting a prescription for a small number of oxycodone to have on hand when her back pain is more severe.  She did see a rheumatologist recently who advised her to take 1000 mg of Tylenol 3 times daily.  She started doing that but then started experiencing GI upset so she backed off on the acetaminophen.  She continues to struggle with chronic lower back pain and pain associated with fibromyalgia which does adversely affect her quality of life and her ability to be active.  Review of systems is assessed and is otherwise negative.    Patient has been advised of split billing requirements and indicates understanding: Yes   Are you in the first 12 months of your Medicare coverage?  No    Back Pain     Healthy Habits:     In general, how would you rate your overall health?  Good    Frequency of exercise:  6-7 days/week    Duration of exercise:  30-45 minutes    Do you usually eat at least 4 servings of fruit and vegetables a day, include whole grains    & fiber and avoid regularly eating high fat or \"junk\" foods?  " Yes    Taking medications regularly:  Yes    Medication side effects:  Other    Ability to successfully perform activities of daily living:  No assistance needed    Home Safety:  Throw rugs in the hallway and lack of grab bars in the bathroom    Hearing Impairment:  Feel that people are mumbling or not speaking clearly, need to ask people to speak up or repeat themselves and difficulty understanding soft or whispered speech    In the past 6 months, have you been bothered by leaking of urine?  No    In general, how would you rate your overall mental or emotional health?  Fair      PHQ-2 Total Score: 2    Additional concerns today:  No    Do you feel safe in your environment? Yes    Have you ever done Advance Care Planning? (For example, a Health Directive, POLST, or a discussion with a medical provider or your loved ones about your wishes): Yes, patient states has an Advance Care Planning document and will bring a copy to the clinic.       Fall risk       Cognitive Screening   1) Repeat 3 items (Leader, Season, Table)    2) Clock draw: NORMAL  3) 3 item recall: Recalls 3 objects  Results: 3 items recalled: COGNITIVE IMPAIRMENT LESS LIKELY    Mini-CogTM Copyright VANESSA Lynch. Licensed by the author for use in [facility]; reprinted with permission (audra@[email or web address]). All rights reserved.      Do you have sleep apnea, excessive snoring or daytime drowsiness?: no    Reviewed and updated as needed this visit by clinical staff  Tobacco  Allergies  Meds              Reviewed and updated as needed this visit by Provider                Social History     Tobacco Use     Smoking status: Never Smoker     Smokeless tobacco: Never Used   Substance Use Topics     Alcohol use: Yes     Alcohol/week: 6.0 standard drinks     Comment: Alcoholic Drinks/day: hard cider         Alcohol Use 10/27/2021   Prescreen: >3 drinks/day or >7 drinks/week? No               Current providers sharing in care for this patient include:    Patient Care Team:  Amirah Moran MD as PCP - General (Family Practice)  Дмитрий Henriquez MD as MD (Orthopedics)  Nemo Dean, PharmD as Pharmacist (Pharmacist)  Amirah Moran MD as Assigned PCP  Alfonzo Haney DPM as Assigned Musculoskeletal Provider    The following health maintenance items are reviewed in Epic and correct as of today:  Health Maintenance Due   Topic Date Due     ANNUAL REVIEW OF HM ORDERS  Never done     ASTHMA ACTION PLAN  Never done     ASTHMA CONTROL TEST  11/22/2019     MEDICARE ANNUAL WELLNESS VISIT  08/13/2021     FALL RISK ASSESSMENT  08/13/2021     Current Outpatient Medications   Medication Sig Dispense Refill     acyclovir (ZOVIRAX) 5 % ointment [ACYCLOVIR (ZOVIRAX) 5 % OINTMENT] Apply up to 5 times daily topically 15 g 1     amitriptyline (ELAVIL) 25 MG tablet Take 3 tablets (75 mg) by mouth At Bedtime 270 tablet 3     amLODIPine (NORVASC) 5 MG tablet [AMLODIPINE (NORVASC) 5 MG TABLET] TAKE ONE TABLET BY MOUTH EVERY DAY 90 tablet 3     busPIRone (BUSPAR) 15 MG tablet TAKE 1 AND 1/2 TABLETS BY MOUTH TWO TIMES A  tablet 1     cholecalciferol, vitamin D3, 1,000 unit tablet [CHOLECALCIFEROL, VITAMIN D3, 1,000 UNIT TABLET] Take 2,000 Units by mouth daily.        estradiol-norethindrone (ACTIVELLA) 1-0.5 mg per tablet [ESTRADIOL-NORETHINDRONE (ACTIVELLA) 1-0.5 MG PER TABLET] Take 1 tablet by mouth daily. 84 tablet 3     famotidine (FOR PEPCID) 10 MG tablet [FAMOTIDINE (FOR PEPCID) 10 MG TABLET] Take 20 mg by mouth daily. Takes two at night , and one in the morning        loratadine (CLARITIN) 10 mg tablet [LORATADINE (CLARITIN) 10 MG TABLET] Take 10 mg by mouth daily.       VENTOLIN  (90 Base) MCG/ACT inhaler [VENTOLIN HFA 90 MCG/ACTUATION INHALER] INHALE 2 PUFFS BY MOUTH EVERY 6 HOURS AS NEEDED FOR WHEEZING OR SHORTNESS OF BREATH 18 g 4         Mammogram Screening - Patient over age 75, has elected to continue with screening.  Pertinent mammograms are  "reviewed under the imaging tab.    Review of Systems   Musculoskeletal: Positive for back pain.         OBJECTIVE:   /64 (BP Location: Right arm, Cuff Size: Adult Regular)   Pulse (!) 124   Ht 1.543 m (5' 0.75\")   Wt 66.4 kg (146 lb 6.4 oz)   SpO2 98%   BMI 27.89 kg/m   Estimated body mass index is 27.89 kg/m  as calculated from the following:    Height as of this encounter: 1.543 m (5' 0.75\").    Weight as of this encounter: 66.4 kg (146 lb 6.4 oz).  Physical Exam  GENERAL APPEARANCE: healthy, alert and no distress  EYES: Eyes grossly normal to inspection, PERRL and conjunctivae and sclerae normal  HENT: ear canals and TM's normal  NECK: no adenopathy, no asymmetry, masses, or scars and thyroid normal to palpation  RESP: lungs clear to auscultation - no rales, rhonchi or wheezes  BREAST: normal without masses, tenderness or nipple discharge and no palpable axillary masses or adenopathy  CV: regular rate and rhythm, normal S1 S2, no S3 or S4, no murmur, click or rub, no peripheral edema and peripheral pulses strong  ABDOMEN: soft, nontender, no hepatosplenomegaly, no masses and bowel sounds normal  SKIN: no suspicious lesions or rashes  NEURO: Normal strength and tone, sensory exam grossly normal, mentation intact and speech normal  PSYCH: mentation appears normal and affect normal/bright        ASSESSMENT / PLAN:   Nicole was seen today for wellness visit, recheck medication and back pain.    Diagnoses and all orders for this visit:    Encounter for annual wellness visit (AWV) in Medicare patient  She is up-to-date on routine health maintenance.  Continue with regular exercise, healthy diet and adequate calcium and vitamin D intake.  Follow-up in 1 year for annual physical    Primary insomnia  -     amitriptyline (ELAVIL) 25 MG tablet; Take 3 tablets (75 mg) by mouth At Bedtime  -Doing well with amitriptyline    Essential hypertension  -     Comprehensive metabolic panel (BMP + Alb, Alk Phos, ALT, AST, " Total. Bili, TP); Future  -     Lipid panel reflex to direct LDL Fasting; Future  -Blood pressure controlled with amlodipine 5 mg daily     Mild intermittent asthma without complication  -     VENTOLIN  (90 Base) MCG/ACT inhaler; [VENTOLIN HFA 90 MCG/ACTUATION INHALER] INHALE 2 PUFFS BY MOUTH EVERY 6 HOURS AS NEEDED FOR WHEEZING OR SHORTNESS OF BREATH  -Stable.  No symptoms of exacerbation    Polyarthralgia  -     HYDROcodone-acetaminophen (NORCO) 5-325 MG tablet; Take 1 tablet by mouth every 6 hours as needed for severe pain  -Discussed pain management options with her.  She will continue with acetaminophen and will gradually work up to taking it 1000 mg 3 times daily.  Did agree to provide her with a limited number of hydrocodone-acetaminophen to have on hand for when pain is more severe.  Prescription for 10 tablets was sent to her pharmacy.  Reviewed  before providing prescription.  Counseled her on use of medication and side effects.  Discussed risks of use of opioid pain medication    Osteoporosis Senile  Continue adequate calcium and vitamin D supplementation.  Was seen by her osteoporosis specialist in August and  had Reclast infusion on 8/31/2021.  Will have follow-up bone density scan in 2 years.    Hormone replacement therapy  She will resume hormone replacement therapy as the symptoms of hot flashes, dry mouth and dry eyes are quite bothersome for her.  After discussion of risks and benefits of HRT, it is felt that the benefits of HRT outweigh the risks    Mixed hyperlipidemia  Recheck lipids today.  Not currently on lipid-lowering medication.  Encouraged regular exercise, healthy diet and weight loss efforts    Selective deficiency of IgG (H)  Recent IgG levels were stable.  Plan to monitor labs on a yearly basis    Anxiety  Continue buspirone    Stage 3 chronic kidney disease, unspecified whether stage 3a or 3b CKD (H)  We will check comprehensive metabolic panel today.  Blood pressure  "controlled.  Avoid nephrotoxic medications    Recurrent cold sores  Continue acyclovir as needed    Fibromyalgia  Continue amitriptyline.  Encouraged regular exercise, healthy diet and adequate rest    Chronic low back pain, unspecified back pain laterality, unspecified whether sciatica present  Provided prescription for a limited number of hydrocodone to use as needed for severe pain.  Counseled on use of medication, adverse side effects and risk of opioid pain medication.   reviewed prior to prescription being sent.  Continue to get regular exercise as tolerated.  Continue acetaminophen.  Avoiding NSAIDs due to underlying chronic kidney disease and history of GI upset    GERD  Continue Pepcid      Patient has been advised of split billing requirements and indicates understanding: Yes  COUNSELING:  Reviewed preventive health counseling, as reflected in patient instructions       Regular exercise       Healthy diet/nutrition    Estimated body mass index is 27.89 kg/m  as calculated from the following:    Height as of this encounter: 1.543 m (5' 0.75\").    Weight as of this encounter: 66.4 kg (146 lb 6.4 oz).    Weight management plan: Discussed healthy diet and exercise guidelines    She reports that she has never smoked. She has never used smokeless tobacco.      Appropriate preventive services were discussed with this patient, including applicable screening as appropriate for cardiovascular disease, diabetes, osteopenia/osteoporosis, and glaucoma.  As appropriate for age/gender, discussed screening for colorectal cancer, prostate cancer, breast cancer, and cervical cancer. Checklist reviewing preventive services available has been given to the patient.    Reviewed patients plan of care and provided an AVS. The Basic Care Plan (routine screening as documented in Health Maintenance) for Nicole meets the Care Plan requirement. This Care Plan has been established and reviewed with the Patient.    Counseling " Resources:  ATP IV Guidelines  Pooled Cohorts Equation Calculator  Breast Cancer Risk Calculator  Breast Cancer: Medication to Reduce Risk  FRAX Risk Assessment  ICSI Preventive Guidelines  Dietary Guidelines for Americans, 2010  [employer]'s MyPlate  ASA Prophylaxis  Lung CA Screening    Amirah Moran MD  [facility]    Identified Health Risks:

## 2021-10-28 ENCOUNTER — LAB (OUTPATIENT)
Dept: LAB | Facility: CLINIC | Age: 76
End: 2021-10-28
Payer: COMMERCIAL

## 2021-10-28 ENCOUNTER — TELEPHONE (OUTPATIENT)
Dept: FAMILY MEDICINE | Facility: CLINIC | Age: 76
End: 2021-10-28

## 2021-10-28 DIAGNOSIS — I10 ESSENTIAL HYPERTENSION: ICD-10-CM

## 2021-10-28 LAB
ALBUMIN SERPL-MCNC: 4.3 G/DL (ref 3.5–5)
ALP SERPL-CCNC: 57 U/L (ref 45–120)
ALT SERPL W P-5'-P-CCNC: 32 U/L (ref 0–45)
ANION GAP SERPL CALCULATED.3IONS-SCNC: 8 MMOL/L (ref 5–18)
AST SERPL W P-5'-P-CCNC: 31 U/L (ref 0–40)
BILIRUB SERPL-MCNC: 0.5 MG/DL (ref 0–1)
BUN SERPL-MCNC: 14 MG/DL (ref 8–28)
CALCIUM SERPL-MCNC: 9.4 MG/DL (ref 8.5–10.5)
CHLORIDE BLD-SCNC: 104 MMOL/L (ref 98–107)
CHOLEST SERPL-MCNC: 270 MG/DL
CO2 SERPL-SCNC: 27 MMOL/L (ref 22–31)
CREAT SERPL-MCNC: 1.07 MG/DL (ref 0.6–1.1)
FASTING STATUS PATIENT QL REPORTED: YES
GFR SERPL CREATININE-BSD FRML MDRD: 51 ML/MIN/1.73M2
GLUCOSE BLD-MCNC: 92 MG/DL (ref 70–125)
HDLC SERPL-MCNC: 79 MG/DL
LDLC SERPL CALC-MCNC: 178 MG/DL
POTASSIUM BLD-SCNC: 4.4 MMOL/L (ref 3.5–5)
PROT SERPL-MCNC: 7.1 G/DL (ref 6–8)
SODIUM SERPL-SCNC: 139 MMOL/L (ref 136–145)
TRIGL SERPL-MCNC: 67 MG/DL

## 2021-10-28 PROCEDURE — 36415 COLL VENOUS BLD VENIPUNCTURE: CPT

## 2021-10-28 PROCEDURE — 80061 LIPID PANEL: CPT

## 2021-10-28 PROCEDURE — 80053 COMPREHEN METABOLIC PANEL: CPT

## 2021-10-28 NOTE — TELEPHONE ENCOUNTER
PA needed for RX below    VENTOLIN  (90 Base) MCG/ACT inhaler 18 g 4 10/27/2021  Yes   Sig: [VENTOLIN HFA 90 MCG/ACTUATION INHALER] INHALE 2 PUFFS BY MOUTH EVERY 6 HOURS AS NEEDED FOR WHEEZING OR SHORTNESS OF BREATH   Sent to pharmacy as: Ventolin  (90 Base) MCG/ACT Inhalation Aerosol Solution   Class: E-Prescribe   Notes to Pharmacy: Pharmacy may dispense brand covered by insurance (Proair, or proventil or ventolin or generic albuterol inhaler)   Order: [id number]   E-Prescribing Status: Receipt confirmed by pharmacy (10/27/2021  2:43 PM CDT)

## 2021-10-28 NOTE — TELEPHONE ENCOUNTER
Central Prior Authorization Team   Phone: [phone]    PA Initiation    Medication: VENTOLIN HFA 90 MCG/ACTUATION   Insurance Company: [employer] - Phone [phone] Fax [phone]  Pharmacy Filling the Rx: [address] [place] [address]  Filling Pharmacy Phone: [phone]  Filling Pharmacy Fax:    Start Date: 10/28/2021

## 2021-10-28 NOTE — TELEPHONE ENCOUNTER
Prior Authorization Approval    Authorization Effective Date: 9/28/2021  Authorization Expiration Date: 10/28/2022  Medication: VENTOLIN HFA 90 MCG/ACTUATION   Approved Dose/Quantity:   Reference #:     Insurance Company: [employer] - Phone [phone] Fax [phone]  Expected CoPay:       CoPay Card Available:      Foundation Assistance Needed:    Which Pharmacy is filling the prescription (Not needed for infusion/clinic administered): [facility] [address] [place] [address]  Pharmacy Notified: Yes  Patient Notified: Yes

## 2021-11-01 ENCOUNTER — MYC MEDICAL ADVICE (OUTPATIENT)
Dept: FAMILY MEDICINE | Facility: CLINIC | Age: 76
End: 2021-11-01

## 2021-11-03 ENCOUNTER — MYC MEDICAL ADVICE (OUTPATIENT)
Dept: FAMILY MEDICINE | Facility: CLINIC | Age: 76
End: 2021-11-03

## 2021-11-03 DIAGNOSIS — E78.2 MIXED HYPERLIPIDEMIA: Primary | ICD-10-CM

## 2021-11-03 RX ORDER — ATORVASTATIN CALCIUM 10 MG/1
10 TABLET, FILM COATED ORAL DAILY
Qty: 90 TABLET | Refills: 3 | Status: SHIPPED | OUTPATIENT
Start: 2021-11-03 | End: 2022-09-30

## 2021-11-07 ENCOUNTER — OFFICE VISIT (OUTPATIENT)
Dept: FAMILY MEDICINE | Facility: CLINIC | Age: 76
End: 2021-11-07
Payer: COMMERCIAL

## 2021-11-07 VITALS
TEMPERATURE: 98 F | OXYGEN SATURATION: 98 % | SYSTOLIC BLOOD PRESSURE: 136 MMHG | BODY MASS INDEX: 27.81 KG/M2 | RESPIRATION RATE: 14 BRPM | HEART RATE: 99 BPM | DIASTOLIC BLOOD PRESSURE: 74 MMHG | WEIGHT: 146 LBS
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
## 2021-11-07 DIAGNOSIS — T14.8XXA ABRASION: ICD-10-CM

## 2021-11-07 DIAGNOSIS — H83.3X3 NOISE-INDUCED HEARING LOSS OF BOTH EARS: Primary | ICD-10-CM

## 2021-11-07 PROCEDURE — 99214 OFFICE O/P EST MOD 30 MIN: CPT | Performed by: FAMILY MEDICINE
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
## 2021-11-11 ENCOUNTER — OFFICE VISIT (OUTPATIENT)
Dept: FAMILY MEDICINE | Facility: CLINIC | Age: 76
End: 2021-11-11
Payer: COMMERCIAL

## 2021-11-11 VITALS
SYSTOLIC BLOOD PRESSURE: 146 MMHG | WEIGHT: 146 LBS | HEART RATE: 72 BPM | DIASTOLIC BLOOD PRESSURE: 76 MMHG | BODY MASS INDEX: 27.56 KG/M2 | HEIGHT: 61 IN

## 2021-11-11 DIAGNOSIS — H93.13 TINNITUS, BILATERAL: Primary | ICD-10-CM

## 2021-11-11 PROBLEM — K31.7 POLYP OF DUODENUM: Status: ACTIVE | Noted: 2018-12-18

## 2021-11-11 PROBLEM — R10.9 NONSPECIFIC ABDOMINAL PAIN: Status: ACTIVE | Noted: 2017-04-03

## 2021-11-11 PROBLEM — Z86.0100 HISTORY OF COLONIC POLYPS: Status: ACTIVE | Noted: 2017-03-30

## 2021-11-11 PROBLEM — M72.2 BILATERAL PLANTAR FASCIITIS: Status: ACTIVE | Noted: 2021-11-11

## 2021-11-11 PROBLEM — K57.30 DIVERTICULAR DISEASE OF LARGE INTESTINE: Status: ACTIVE | Noted: 2017-04-03

## 2021-11-11 PROBLEM — L25.9 CONTACT DERMATITIS AND OTHER ECZEMA, DUE TO UNSPECIFIED CAUSE: Status: ACTIVE | Noted: 2021-11-11

## 2021-11-11 PROBLEM — K21.00 GASTRO-ESOPHAGEAL REFLUX DISEASE WITH ESOPHAGITIS: Status: ACTIVE | Noted: 2018-12-18

## 2021-11-11 PROCEDURE — 99213 OFFICE O/P EST LOW 20 MIN: CPT | Performed by: FAMILY MEDICINE

## 2021-11-11 ASSESSMENT — MIFFLIN-ST. JEOR: SCORE: 1090.66
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
## 2021-12-08 ENCOUNTER — MYC MEDICAL ADVICE (OUTPATIENT)
Dept: FAMILY MEDICINE | Facility: CLINIC | Age: 76
End: 2021-12-08
Payer: COMMERCIAL

## 2021-12-08 DIAGNOSIS — H93.13 TINNITUS, BILATERAL: Primary | ICD-10-CM

## 2021-12-08 DIAGNOSIS — H91.93 BILATERAL HEARING LOSS, UNSPECIFIED HEARING LOSS TYPE: ICD-10-CM

## 2022-01-23 ENCOUNTER — HEALTH MAINTENANCE LETTER (OUTPATIENT)
Age: 77
End: 2022-01-23

## 2022-05-03 ENCOUNTER — TELEPHONE (OUTPATIENT)
Dept: FAMILY MEDICINE | Facility: CLINIC | Age: 77
End: 2022-05-03
Payer: COMMERCIAL

## 2022-05-03 NOTE — TELEPHONE ENCOUNTER
PA Initiation    Medication: estradiol-norethindrone (ACTIVELLA) 1-0.5 mg per tablet  Insurance Company: [facility] [[id number]]  [facility]    KEY [id number]  Filling Pharmacy Phone:    Filling Pharmacy Fax:    Start Date:  05/14/2022

## 2022-05-06 NOTE — TELEPHONE ENCOUNTER
Prior Authorization Approval    Authorization Effective Date: 4/6/2022  Authorization Expiration Date: 5/6/2023  Medication: estradiol-norethindrone (ACTIVELLA) 1-0.5 mg per tablet  Approved Dose/Quantity:    Reference #:     Insurance Company: [employer] - Phone [phone] Fax [phone]  Expected CoPay:

## 2022-05-09 ENCOUNTER — MYC MEDICAL ADVICE (OUTPATIENT)
Dept: FAMILY MEDICINE | Facility: CLINIC | Age: 77
End: 2022-05-09

## 2022-05-09 ENCOUNTER — OFFICE VISIT (OUTPATIENT)
Dept: FAMILY MEDICINE | Facility: CLINIC | Age: 77
End: 2022-05-09
Payer: COMMERCIAL

## 2022-05-09 VITALS
BODY MASS INDEX: 28.16 KG/M2 | OXYGEN SATURATION: 98 % | WEIGHT: 147.8 LBS | TEMPERATURE: 98 F | DIASTOLIC BLOOD PRESSURE: 60 MMHG | HEART RATE: 96 BPM | SYSTOLIC BLOOD PRESSURE: 130 MMHG

## 2022-05-09 DIAGNOSIS — I10 ESSENTIAL HYPERTENSION: ICD-10-CM

## 2022-05-09 DIAGNOSIS — N18.30 STAGE 3 CHRONIC KIDNEY DISEASE, UNSPECIFIED WHETHER STAGE 3A OR 3B CKD (H): ICD-10-CM

## 2022-05-09 DIAGNOSIS — D80.3 SELECTIVE DEFICIENCY OF IGG (H): ICD-10-CM

## 2022-05-09 DIAGNOSIS — H90.6 MIXED HEARING LOSS, BILATERAL: ICD-10-CM

## 2022-05-09 DIAGNOSIS — Z79.899 ENCOUNTER FOR LONG-TERM (CURRENT) USE OF MEDICATIONS: Primary | ICD-10-CM

## 2022-05-09 DIAGNOSIS — Z79.890 CURRENT LONG-TERM USE OF POSTMENOPAUSAL HORMONE REPLACEMENT THERAPY: ICD-10-CM

## 2022-05-09 DIAGNOSIS — R68.2 DRY MOUTH: ICD-10-CM

## 2022-05-09 DIAGNOSIS — J45.20 MILD INTERMITTENT ASTHMA WITHOUT COMPLICATION: ICD-10-CM

## 2022-05-09 DIAGNOSIS — E78.2 MIXED HYPERLIPIDEMIA: ICD-10-CM

## 2022-05-09 LAB
ALBUMIN SERPL-MCNC: 4.3 G/DL (ref 3.5–5)
ALBUMIN UR-MCNC: NEGATIVE MG/DL
ALP SERPL-CCNC: 45 U/L (ref 45–120)
ALT SERPL W P-5'-P-CCNC: 15 U/L (ref 0–45)
ANION GAP SERPL CALCULATED.3IONS-SCNC: 13 MMOL/L (ref 5–18)
APPEARANCE UR: CLEAR
AST SERPL W P-5'-P-CCNC: 22 U/L (ref 0–40)
BACTERIA #/AREA URNS HPF: ABNORMAL /HPF
BILIRUB SERPL-MCNC: 0.5 MG/DL (ref 0–1)
BILIRUB UR QL STRIP: NEGATIVE
BUN SERPL-MCNC: 14 MG/DL (ref 8–28)
CALCIUM SERPL-MCNC: 9.1 MG/DL (ref 8.5–10.5)
CHLORIDE BLD-SCNC: 105 MMOL/L (ref 98–107)
CHOLEST SERPL-MCNC: 160 MG/DL
CO2 SERPL-SCNC: 22 MMOL/L (ref 22–31)
COLOR UR AUTO: YELLOW
CREAT SERPL-MCNC: 1.16 MG/DL (ref 0.6–1.1)
CREAT UR-MCNC: 54 MG/DL
FASTING STATUS PATIENT QL REPORTED: YES
GFR SERPL CREATININE-BSD FRML MDRD: 49 ML/MIN/1.73M2
GLUCOSE BLD-MCNC: 88 MG/DL (ref 70–125)
GLUCOSE UR STRIP-MCNC: NEGATIVE MG/DL
HDLC SERPL-MCNC: 60 MG/DL
HGB BLD-MCNC: 13.5 G/DL (ref 11.7–15.7)
HGB UR QL STRIP: ABNORMAL
KETONES UR STRIP-MCNC: NEGATIVE MG/DL
LDLC SERPL CALC-MCNC: 86 MG/DL
LEUKOCYTE ESTERASE UR QL STRIP: ABNORMAL
MICROALBUMIN UR-MCNC: <0.5 MG/DL (ref 0–1.99)
MICROALBUMIN/CREAT UR: NORMAL MG/G{CREAT}
NITRATE UR QL: NEGATIVE
PH UR STRIP: 6 [PH] (ref 5–8)
POTASSIUM BLD-SCNC: 4.4 MMOL/L (ref 3.5–5)
PROT SERPL-MCNC: 6.9 G/DL (ref 6–8)
RBC #/AREA URNS AUTO: ABNORMAL /HPF
SODIUM SERPL-SCNC: 140 MMOL/L (ref 136–145)
SP GR UR STRIP: <=1.005 (ref 1–1.03)
SQUAMOUS #/AREA URNS AUTO: ABNORMAL /LPF
TRANS CELLS #/AREA URNS HPF: ABNORMAL /HPF
TRIGL SERPL-MCNC: 69 MG/DL
UROBILINOGEN UR STRIP-ACNC: 0.2 E.U./DL
WBC #/AREA URNS AUTO: ABNORMAL /HPF

## 2022-05-09 PROCEDURE — 85018 HEMOGLOBIN: CPT | Performed by: FAMILY MEDICINE

## 2022-05-09 PROCEDURE — 80061 LIPID PANEL: CPT | Performed by: FAMILY MEDICINE

## 2022-05-09 PROCEDURE — 81001 URINALYSIS AUTO W/SCOPE: CPT | Performed by: FAMILY MEDICINE

## 2022-05-09 PROCEDURE — 36415 COLL VENOUS BLD VENIPUNCTURE: CPT | Performed by: FAMILY MEDICINE

## 2022-05-09 PROCEDURE — 99214 OFFICE O/P EST MOD 30 MIN: CPT | Performed by: FAMILY MEDICINE

## 2022-05-09 PROCEDURE — 80053 COMPREHEN METABOLIC PANEL: CPT | Performed by: FAMILY MEDICINE

## 2022-05-09 PROCEDURE — 82043 UR ALBUMIN QUANTITATIVE: CPT | Performed by: FAMILY MEDICINE

## 2022-05-09 PROCEDURE — 87086 URINE CULTURE/COLONY COUNT: CPT | Performed by: FAMILY MEDICINE

## 2022-05-09 RX ORDER — AMLODIPINE BESYLATE 5 MG/1
TABLET ORAL
Qty: 90 TABLET | Refills: 3 | Status: SHIPPED | OUTPATIENT
Start: 2022-05-09 | End: 2022-08-02 | Stop reason: ALTCHOICE

## 2022-05-09 RX ORDER — ESTRADIOL AND NORETHINDRONE ACETATE 1; .5 MG/1; MG/1
1 TABLET ORAL DAILY
Qty: 84 TABLET | Refills: 3 | Status: SHIPPED | OUTPATIENT
Start: 2022-05-09 | End: 2023-06-17

## 2022-05-09 RX ORDER — ALBUTEROL SULFATE 90 UG/1
AEROSOL, METERED RESPIRATORY (INHALATION)
Qty: 18 G | Refills: 4 | Status: SHIPPED | OUTPATIENT
Start: 2022-05-09 | End: 2023-06-17

## 2022-05-09 ASSESSMENT — ASTHMA QUESTIONNAIRES: ACT_TOTALSCORE: 20
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
## 2022-05-09 NOTE — TELEPHONE ENCOUNTER
Patient seeking advice on her cholesterol medication. Routing to PCP to review/advise on dose cahnges or medication changes.    Thank you,    Cassidy PEARSON RN

## 2022-05-11 LAB — BACTERIA UR CULT: NORMAL

## 2022-05-24 ENCOUNTER — TRANSFERRED RECORDS (OUTPATIENT)
Dept: HEALTH INFORMATION MANAGEMENT | Facility: CLINIC | Age: 77
End: 2022-05-24
Payer: COMMERCIAL

## 2022-06-20 ENCOUNTER — MYC MEDICAL ADVICE (OUTPATIENT)
Dept: FAMILY MEDICINE | Facility: CLINIC | Age: 77
End: 2022-06-20
Payer: COMMERCIAL

## 2022-06-20 ENCOUNTER — MYC REFILL (OUTPATIENT)
Dept: PHARMACY | Facility: CLINIC | Age: 77
End: 2022-06-20
Payer: COMMERCIAL

## 2022-06-20 DIAGNOSIS — B00.9 HERPES SIMPLEX VIRUS (HSV) INFECTION: ICD-10-CM

## 2022-06-20 DIAGNOSIS — K21.9 GASTROESOPHAGEAL REFLUX DISEASE WITHOUT ESOPHAGITIS: Primary | ICD-10-CM

## 2022-06-21 RX ORDER — ACYCLOVIR 50 MG/G
OINTMENT TOPICAL
Qty: 30 G | Refills: 1 | Status: SHIPPED | OUTPATIENT
Start: 2022-06-21 | End: 2023-07-21

## 2022-06-21 NOTE — TELEPHONE ENCOUNTER
Routing refill request to provider for review/approval because:  Drug not on the [facility] refill protocol   Medication is reported/historical    Last Written Prescription Date:  09/10/2018  Last Fill Quantity: 15 g,  # refills: 1   Last office visit provider:  Dr. Moran on 05/09/2022     Requested Prescriptions   Pending Prescriptions Disp Refills     acyclovir (ZOVIRAX) 5 % external ointment 15 g 1       There is no refill protocol information for this order          Cassidy Farmer RN 06/21/22 8:14 AM

## 2022-06-22 ENCOUNTER — TELEPHONE (OUTPATIENT)
Dept: FAMILY MEDICINE | Facility: CLINIC | Age: 77
End: 2022-06-22

## 2022-06-22 NOTE — TELEPHONE ENCOUNTER
PA Initiation    Medication: acyclovir (ZOVIRAX) 5 % external ointment  Insurance Company:  CLARITA Yutan [[id number]]  Pharmacy Filling the Rx: [id number]  [place]   Filling Pharmacy Phone: [phone]   Filling Pharmacy Fax: [phone]   Start Date:  06/21/2022

## 2022-06-24 NOTE — TELEPHONE ENCOUNTER
Central Prior Authorization Team   Phone: [phone]    PA Initiation    Medication: acyclovir (ZOVIRAX) 5 % external ointment  Insurance Company: [employer] - Phone [phone] Fax [phone]  Pharmacy Filling the Rx: [facility] [address] [place] [address]  Filling Pharmacy Phone: [phone]  Filling Pharmacy Fax:    Start Date: 6/24/2022

## 2022-06-28 NOTE — TELEPHONE ENCOUNTER
Prior Authorization Approval    Authorization Effective Date: 5/28/2022  Authorization Expiration Date: 6/27/2023  Medication: acyclovir (ZOVIRAX) 5 % external ointment  Approved Dose/Quantity:    Reference #:     Insurance Company: [employer] [phone] Fax [phone]  Expected CoPay:       CoPay Card Available:      Foundation Assistance Needed:    Which Pharmacy is filling the prescription (Not needed for infusion/clinic administered): [facility] [address] [place] [address]  Pharmacy Notified: Yes  Patient Notified: Yes

## 2022-07-06 ENCOUNTER — MYC MEDICAL ADVICE (OUTPATIENT)
Dept: FAMILY MEDICINE | Facility: CLINIC | Age: 77
End: 2022-07-06

## 2022-07-06 ENCOUNTER — OFFICE VISIT (OUTPATIENT)
Dept: PODIATRY | Facility: CLINIC | Age: 77
End: 2022-07-06
Payer: COMMERCIAL

## 2022-07-06 VITALS
TEMPERATURE: 98.1 F | HEIGHT: 62 IN | HEART RATE: 92 BPM | RESPIRATION RATE: 16 BRPM | BODY MASS INDEX: 27.05 KG/M2 | WEIGHT: 147 LBS | OXYGEN SATURATION: 96 %

## 2022-07-06 DIAGNOSIS — M72.2 PLANTAR FASCIITIS: Primary | ICD-10-CM

## 2022-07-06 PROCEDURE — 99213 OFFICE O/P EST LOW 20 MIN: CPT | Performed by: PODIATRIST

## 2022-07-06 ASSESSMENT — PAIN SCALES - GENERAL: PAINLEVEL: MODERATE PAIN (5)

## 2022-07-06 NOTE — PATIENT INSTRUCTIONS
What are Prescription Custom Orthotics?  Custom orthotics are specially-made devices designed to support and comfort your feet. Prescription orthotics are crafted for you and no one else. They match the contours of your feet precisely and are designed for the way you move. Orthotics are only manufactured after a podiatrist has conducted a complete evaluation of your feet, ankles, and legs, so the orthotic can accommodate your unique foot structure and pathology.  Prescription orthotics are divided into two categories:  Functional orthotics are designed to control abnormal motion. They may be used to treat foot pain caused by abnormal motion; they can also be used to treat injuries such as shin splints or tendinitis. Functional orthotics are usually crafted of a semi-rigid material such as plastic or graphite.  Accommodative orthotics are softer and meant to provide additional cushioning and support. They can be used to treat diabetic foot ulcers, painful calluses on the bottom of the foot, and other uncomfortable conditions.  Podiatrists use orthotics to treat foot problems such as plantar fasciitis, bursitis, tendinitis, diabetic foot ulcers, and foot, ankle, and heel pain. Clinical research studies have shown that podiatrist-prescribed foot orthotics decrease foot pain and improve function.  Orthotics typically cost more than shoe inserts purchased in a retail store, but the additional cost is usually well worth it. Unlike shoe inserts, orthotics are molded to fit each individual foot, so you can be sure that your orthotics fit and do what they're supposed to do. Prescription orthotics are also made of top-notch materials and last many years when cared for properly. Insurance often helps pay for prescription orthotics.  What are Shoe Inserts?   You've seen them at the grocery store and at the mall. You've probably even seen them on TV and online. Shoe inserts are any kind of non-prescription foot support designed  to be worn inside a shoe. Pre-packaged, mass produced, arch supports are shoe inserts. So are the  custom-made  insoles and foot supports that you can order online or at retail stores. Unless the device has been prescribed by a doctor and crafted for your specific foot, it's a shoe insert, not a custom orthotic device--despite what the ads might say.  Shoe inserts can be very helpful for a variety of foot ailments, including flat arches and foot and leg pain. They can cushion your feet, provide comfort, and support your arches, but they can't correct biomechanical foot problems or cure long-standing foot issues.  The most common types of shoe inserts are:  Arch supports: Some people have high arches. Others have low arches or flat feet. Arch supports generally have a  bumped-up  appearance and are designed to support the foot's natural arch.   Insoles: Insoles slip into your shoe to provide extra cushioning and support. Insoles are often made of gel, foam, or plastic.   Heel liners: Heel liners, sometimes called heel pads or heel cups, provide extra cushioning in the heel region. They may be especially useful for patients who have foot pain caused by age-related thinning of the heels' natural fat pads.   Foot cushions: Do your shoes rub against your heel or your toes? Foot cushions come in many different shapes and sizes and can be used as a barrier between you and your shoe.  Choosing an Over-the-Counter Shoe Insert  Selecting a shoe insert from the wide variety of devices on the market can be overwhelming. Here are some podiatrist-tested tips to help you find the insert that best meets your needs:  Consider your health. Do you have diabetes? Problems with circulation? An over-the-counter insert may not be your best bet. Diabetes and poor circulation increase your risk of foot ulcers and infections, so schedule an appointment with a podiatrist. He or she can help you select a solution that won't cause additional  health problems.   Think about the purpose. Are you planning to run a marathon, or do you just need a little arch support in your work shoes? Look for a product that fits your planned level of activity.   Bring your shoes. For the insert to be effective, it has to fit into your shoes. So bring your sneakers, dress shoes, or work boots--whatever you plan to wear with your insert. Look for an insert that will fit the contours of your shoe.   Try them on. If all possible, slip the insert into your shoe and try it out. Walk around a little. How does it feel? Don't assume that feelings of pressure will go away with continued wear. (If you can't try the inserts at the store, ask about the store's return policy and hold on to your receipt.)    Please call one of the [place] locations below to schedule an appointment. If you received a prescription please bring it with you to your appointment. Some locations are limited to what they carry.    Office Locations    [facility]  [address] [place] [address]  Home Medical Equipment, [address]   Phone: [phone]   Orthotics and Prosthetics, [address]   Phone: [phone]    [facility]  [address]  [address]   [place] [address]   Phone: [phone]    [facility] Professional [facility]ld[facility]  [address]  [place] [address]  Phone: [phone]    [facility]ld[facility]   [address]  [place] [address]  Phone: [phone]    [facility]  [address]  [place] [address]  Phone: [phone]    [facility]  [address]  [place] [address]  Phone: [phone]    [place]   [address] [place] [address]   Phone: [phone]    WEARING YOUR CUSTOM FOOT ORTHOTICS   Most  insurance plans cover one pair of orthotics per year. You must check with your   insurance plan to see what your payment responsibility will be. Please call your   insurance company by calling the number on the back of your insurance card.   Orthotic's are non-refundable and non-returnable.   Orthotics are made of various designs. Some orthotics are covered with material that extends beyond your toes. If your orthotic is of this design, you will likely need to trim the toe end to get a proper fit. The insole from your shoe can be used as a template. Simply overlay the shoe insert on top of the custom orthotic. Align the heel end while tracing the length of the insert onto the custom orthotic. Use a large scissor to trim the toe end until you get a proper fit in the shoe.   The orthotic needs to be pushed as far back in the shoe as possible. The heel portion should not ride forward so as not to irritate your heel.   Orthotics are designed to work with socks. Excessive perspiration will shorten the life span of the orthotics. Remove the orthotic from the shoe frequently for proper drying.   The break-in period lasts for weeks. People new to orthotics will likely experience new aches and pains. The orthotic is forcing your foot into a new position. Arch, foot and leg muscle aches and fatigue are common during these weeks. Minor discomfort can be considered normal break in phenomenon. Start wearing your orthotic around your home your first day. Limited activity for one to two hours is recommended. You can increase one or two additional hours each day provided the aches and pains are subsiding. The degree of discomfort, fatigue and problems will dictate the speed of break in. You may require multiple weeks to work up to full time use.   Do not continue wearing your orthotics if they are creating problems such as blisters or sores. Do not hesitate to call the clinic to speak with a nurse regarding orthotic   break in,  fit, trimming, etc. You may also need to see the doctor if the orthotics are   simply not working out. Adjustments are sometimes made to improve orthotic   function.     Orthotics will only work in certain styles and types of shoes. Orthotics rarely work in dress shoes. Slip-ons, clogs, sandals and heels are particularly troublesome. Specially designed orthotics may be necessary for these types of shoes. Your custom orthotic was designed for activities that require appropriate walking or running shoes. Lace up athletic shoes, walking shoes or work boots should work appropriately. You may need a wider or longer shoe. Shoes with a removable  or insert work best. In general, you want to remove an insert from the shoe before placing the orthotic into the shoe. Shoes without a removable liner may not work as well.     When purchasing new shoes, bring your orthotics along to get a proper fit. Shop at stores that are familiar with orthotics.   Frequent washing of the orthotic may shorten the life span of the top cover. The top cover can be replaced but will generally last one to five years depending on use and foot perspiration.

## 2022-07-06 NOTE — PROGRESS NOTES
FOOT AND ANKLE SURGERY/PODIATRY Progress Note        ASSESSMENT:   Planter fasciitis bilateral feet  Guerrero's neuroma left foot    HPI: Nicole Marcial was seen again today complaining of bilateral foot pain.  The patient has a history of plantar fasciitis.  She stated that she has had continued moderate pain on the bottom of both heels with weightbearing and ambulation.  She also stated that she has pain in the ball of her left foot between the third and fourth toes.  This pain is also aggravated with weightbearing and ambulation.  She has not had any trauma to her feet.  She has not had any associated redness or swelling.  All of her pain is relieved with nonweightbearing.  She does have a history of wearing orthotics but she is requesting a new pair of orthotics at this time.    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 Rupert     RELEASE TRIGGER FINGER       SHOULDER SURGERY       SINUS SURGERY       TONSILLECTOMY         Allergies   Allergen Reactions     Adhesive [Mecrylate] Unknown     Contact allergy     Aspirin (Tartrazine Only) [Tartrazine] Unknown     Abdominal pain       Bee Venom Unknown     Cefuroxime Axetil [Cefuroxime] Unknown     Rash     Cephalosporins Unknown     Rash--ceftin     Chloral Hydrate Analogues [Chloral Hydrate] Unknown     Contact allergy     Chlorhexidine Gluconate [Chlorhexidine] Unknown     Contact allergy     Clindamycin Unknown     C diff     Codeine Unknown     Dairy [Lac Bovis] Unknown     Doxepin Unknown     hyper     Ergot Alkaloids [Ergoloid Mesylates] Unknown     Erythromycin Base  [Erythromycin] Unknown     Stomach pain     Formaldehyde Hives     Gluten [Gluten Meal] Unknown     Celiac panel not positive, patient states has abdominal pain when she eats gluten     Hydroxyzine Unknown     Latex Unknown     Morphine Unknown     Not effective     Nickel Unknown     Nortriptyline Unknown     hyper     Pregabalin Unknown     Pseudoephedrine Unknown     Sulfamethoxazole      Terconazole Unknown     Tramadol Unknown     Trazodone Nausea and Vomiting     Trimethoprim      Vancomycin Diarrhea     Venlafaxine Unknown     Vistaril [Hydroxyzine] Unknown     Menthyl Valerate Rash     Metronidazole Rash     Rash and neuropathy     Quaternium-15 Rash         Current Outpatient Medications:      acyclovir (ZOVIRAX) 5 % external ointment, Apply topically 5 times daily, Disp: 30 g, Rfl: 1     amitriptyline (ELAVIL) 25 MG tablet, Take 3 tablets (75 mg) by mouth At Bedtime, Disp: 270 tablet, Rfl: 3     amLODIPine (NORVASC) 5 MG tablet, [AMLODIPINE (NORVASC) 5 MG TABLET] TAKE ONE TABLET BY MOUTH EVERY DAY, Disp: 90 tablet, Rfl: 3     atorvastatin (LIPITOR) 10 MG tablet, Take 1 tablet (10 mg) by mouth daily, Disp: 90 tablet, Rfl: 3     busPIRone (BUSPAR) 15 MG tablet, TAKE 1 AND 1/2 TABLETS BY MOUTH TWO TIMES A DAY, Disp: 270 tablet, Rfl: 1     cholecalciferol, vitamin D3, 1,000 unit tablet, [CHOLECALCIFEROL, VITAMIN D3, 1,000 UNIT TABLET] Take 2,000 Units by mouth daily. , Disp: , Rfl:      estradiol-norethindrone (ACTIVELLA) 1-0.5 MG tablet, Take 1 tablet by mouth daily, Disp: 84 tablet, Rfl: 3     famotidine (FOR PEPCID) 10 MG tablet, [FAMOTIDINE (FOR PEPCID) 10 MG TABLET] Take 20 mg by mouth daily. Takes two at night , and one in the morning , Disp: , Rfl:      loratadine (CLARITIN) 10 mg tablet, [LORATADINE (CLARITIN) 10 MG TABLET] Take 10 mg by mouth daily., Disp: , Rfl:      VENTOLIN  (90 Base) MCG/ACT inhaler, [VENTOLIN HFA 90 MCG/ACTUATION INHALER] INHALE 2 PUFFS BY MOUTH EVERY 6 HOURS AS NEEDED FOR  WHEEZING OR SHORTNESS OF BREATH, Disp: 18 g, Rfl: 4    Family History   Problem Relation Age of Onset     Osteoporosis Mother      Heart Disease Father      Hyperlipidemia Father      Anxiety Disorder Father      Substance Abuse Father      Sleep Apnea Sister      Snoring Sister      Heart Disease Sister      No Known Problems Daughter      No Known Problems Maternal Grandmother      No Known Problems Maternal Grandfather      No Known Problems Paternal Grandmother      No Known Problems Paternal Grandfather      No Known Problems Maternal Aunt      No Known Problems Paternal Aunt      Hypertension Brother      Depression Sister      Mental Illness Brother      Hereditary Breast and Ovarian Cancer Syndrome No family hx of      Breast Cancer No family hx of      Cancer No family hx of      Colon Cancer No family hx of      Endometrial Cancer No family hx of      Ovarian Cancer No family hx of        Social History     Socioeconomic History     Marital status:      Spouse name: Not on file     Number of children: Not on file     Years of education: Not on file     Highest education level: Not on file   Occupational History     Not on file   Tobacco Use     Smoking status: Never Smoker     Smokeless tobacco: Never Used   Substance and Sexual Activity     Alcohol use: Yes     Alcohol/week: 6.0 standard drinks     Comment: Alcoholic Drinks/day: hard cider     Drug use: Not Currently     Types: Marijuana     Comment: Drug use: very rare     Sexual activity: Not Currently   Other Topics Concern     Not on file   Social History Narrative     Not on file     Social Determinants of Health     Financial Resource Strain: Not on file   Food Insecurity: Not on file   Transportation Needs: Not on file   Physical Activity: Not on file   Stress: Not on file   Social Connections: Not on file   Intimate Partner Violence: Not on file   Housing Stability: Not on file       10 point Review of Systems is negative      Pulse 92    "Temp 98.1  F (36.7  C)   Resp 16   Ht 1.575 m (5' 2\")   Wt 66.7 kg (147 lb)   SpO2 96%   BMI 26.89 kg/m      BMI= Body mass index is 26.89 kg/m .    OBJECTIVE:  General appearance: Patient is alert and fully cooperative with history & exam.  No sign of distress is noted during the visit.  Vascular: Dorsalis pedis and posterior tibial pulses are palpable. There is no pedal hair growth bilaterally.  CFT < 3 sec from anterior tibial surface to distal digits bilaterally. There is no appreciable edema noted.  Dermatologic: Turgor and texture are within normal limits. No coloration or temperature changes. No primary or secondary lesions noted.  Neurologic: All epicritic and proprioceptive sensations are grossly intact bilaterally.  There is a positive Christelle sign noted third intermetatarsal space left foot.  Musculoskeletal: All active and passive ankle, subtalar, midtarsal joint range of motion are grossly intact without pain or crepitus, with the exception of the first MPJ right foot. Manual muscle strength is within normal limits bilaterally. All dorsiflexors, plantarflexors, invertors, evertors are intact bilaterally. Tenderness present to the plantar medial aspect of both heels and the first MPJ right foot on palpation. Tenderness to the first MPJ right foot with range of motion.  There is a large firm palpable subcutaneous mass on the medial aspect of the head of the first metatarsal right foot.  Calf is soft/non-tender without warmth/induration    Imaging:         No results found.         TREATMENT:  I have recommended a new pair of orthotics.  The patient is to return to the clinic as needed.        Alfonzo Haney; BOOKER  [facility] Foot & Ankle Surgery/Podiatry     "

## 2022-07-06 NOTE — LETTER
7/6/2022         RE: Nicole Marcial  [id number] [address]        Dear Colleague,    Thank you for referring your patient, Nicole Marcial, to the [facility]. Please see a copy of my visit note below.    FOOT AND ANKLE SURGERY/PODIATRY Progress Note        ASSESSMENT:   Planter fasciitis bilateral feet  Guerrero's neuroma left foot    HPI: Nicole Marcial was seen again today complaining of bilateral foot pain.  The patient has a history of plantar fasciitis.  She stated that she has had continued moderate pain on the bottom of both heels with weightbearing and ambulation.  She also stated that she has pain in the ball of her left foot between the third and fourth toes.  This pain is also aggravated with weightbearing and ambulation.  She has not had any trauma to her feet.  She has not had any associated redness or swelling.  All of her pain is relieved with nonweightbearing.  She does have a history of wearing orthotics but she is requesting a new pair of orthotics at this time.    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 Chilkat     RELEASE TRIGGER FINGER       SHOULDER SURGERY       SINUS SURGERY       TONSILLECTOMY         Allergies   Allergen Reactions     Adhesive [Mecrylate] Unknown     Contact allergy     Aspirin (Tartrazine Only) [Tartrazine] Unknown     Abdominal pain       Bee Venom Unknown     Cefuroxime Axetil [Cefuroxime] Unknown     Rash     Cephalosporins Unknown     Rash--ceftin     Chloral Hydrate Analogues [Chloral Hydrate] Unknown     Contact allergy      Chlorhexidine Gluconate [Chlorhexidine] Unknown     Contact allergy     Clindamycin Unknown     C diff     Codeine Unknown     Dairy [Lac Bovis] Unknown     Doxepin Unknown     hyper     Ergot Alkaloids [Ergoloid Mesylates] Unknown     Erythromycin Base [Erythromycin] Unknown     Stomach pain     Formaldehyde Hives     Gluten [Gluten Meal] Unknown     Celiac panel not positive, patient states has abdominal pain when she eats gluten     Hydroxyzine Unknown     Latex Unknown     Morphine Unknown     Not effective     Nickel Unknown     Nortriptyline Unknown     hyper     Pregabalin Unknown     Pseudoephedrine Unknown     Sulfamethoxazole      Terconazole Unknown     Tramadol Unknown     Trazodone Nausea and Vomiting     Trimethoprim      Vancomycin Diarrhea     Venlafaxine Unknown     Vistaril [Hydroxyzine] Unknown     Menthyl Valerate Rash     Metronidazole Rash     Rash and neuropathy     Quaternium-15 Rash         Current Outpatient Medications:      acyclovir (ZOVIRAX) 5 % external ointment, Apply topically 5 times daily, Disp: 30 g, Rfl: 1     amitriptyline (ELAVIL) 25 MG tablet, Take 3 tablets (75 mg) by mouth At Bedtime, Disp: 270 tablet, Rfl: 3     amLODIPine (NORVASC) 5 MG tablet, [AMLODIPINE (NORVASC) 5 MG TABLET] TAKE ONE TABLET BY MOUTH EVERY DAY, Disp: 90 tablet, Rfl: 3     atorvastatin (LIPITOR) 10 MG tablet, Take 1 tablet (10 mg) by mouth daily, Disp: 90 tablet, Rfl: 3     busPIRone (BUSPAR) 15 MG tablet, TAKE 1 AND 1/2 TABLETS BY MOUTH TWO TIMES A DAY, Disp: 270 tablet, Rfl: 1     cholecalciferol, vitamin D3, 1,000 unit tablet, [CHOLECALCIFEROL, VITAMIN D3, 1,000 UNIT TABLET] Take 2,000 Units by mouth daily. , Disp: , Rfl:      estradiol-norethindrone (ACTIVELLA) 1-0.5 MG tablet, Take 1 tablet by mouth daily, Disp: 84 tablet, Rfl: 3     famotidine (FOR PEPCID) 10 MG tablet, [FAMOTIDINE (FOR PEPCID) 10 MG TABLET] Take 20 mg by mouth daily. Takes two at night , and one in the morning , Disp: , Rfl:       loratadine (CLARITIN) 10 mg tablet, [LORATADINE (CLARITIN) 10 MG TABLET] Take 10 mg by mouth daily., Disp: , Rfl:      VENTOLIN  (90 Base) MCG/ACT inhaler, [VENTOLIN HFA 90 MCG/ACTUATION INHALER] INHALE 2 PUFFS BY MOUTH EVERY 6 HOURS AS NEEDED FOR WHEEZING OR SHORTNESS OF BREATH, Disp: 18 g, Rfl: 4    Family History   Problem Relation Age of Onset     Osteoporosis Mother      Heart Disease Father      Hyperlipidemia Father      Anxiety Disorder Father      Substance Abuse Father      Sleep Apnea Sister      Snoring Sister      Heart Disease Sister      No Known Problems Daughter      No Known Problems Maternal Grandmother      No Known Problems Maternal Grandfather      No Known Problems Paternal Grandmother      No Known Problems Paternal Grandfather      No Known Problems Maternal Aunt      No Known Problems Paternal Aunt      Hypertension Brother      Depression Sister      Mental Illness Brother      Hereditary Breast and Ovarian Cancer Syndrome No family hx of      Breast Cancer No family hx of      Cancer No family hx of      Colon Cancer No family hx of      Endometrial Cancer No family hx of      Ovarian Cancer No family hx of        Social History     Socioeconomic History     Marital status:      Spouse name: Not on file     Number of children: Not on file     Years of education: Not on file     Highest education level: Not on file   Occupational History     Not on file   Tobacco Use     Smoking status: Never Smoker     Smokeless tobacco: Never Used   Substance and Sexual Activity     Alcohol use: Yes     Alcohol/week: 6.0 standard drinks     Comment: Alcoholic Drinks/day: hard cider     Drug use: Not Currently     Types: Marijuana     Comment: Drug use: very rare     Sexual activity: Not Currently   Other Topics Concern     Not on file   Social History Narrative     Not on file     Social Determinants of Health     Financial Resource Strain: Not on file   Food Insecurity: Not on file  "  Transportation Needs: Not on file   Physical Activity: Not on file   Stress: Not on file   Social Connections: Not on file   Intimate Partner Violence: Not on file   Housing Stability: Not on file       10 point Review of Systems is negative      Pulse 92   Temp 98.1  F (36.7  C)   Resp 16   Ht 1.575 m (5' 2\")   Wt 66.7 kg (147 lb)   SpO2 96%   BMI 26.89 kg/m      BMI= Body mass index is 26.89 kg/m .    OBJECTIVE:  General appearance: Patient is alert and fully cooperative with history & exam.  No sign of distress is noted during the visit.  Vascular: Dorsalis pedis and posterior tibial pulses are palpable. There is no pedal hair growth bilaterally.  CFT < 3 sec from anterior tibial surface to distal digits bilaterally. There is no appreciable edema noted.  Dermatologic: Turgor and texture are within normal limits. No coloration or temperature changes. No primary or secondary lesions noted.  Neurologic: All epicritic and proprioceptive sensations are grossly intact bilaterally.  There is a positive Christelle sign noted third intermetatarsal space left foot.  Musculoskeletal: All active and passive ankle, subtalar, midtarsal joint range of motion are grossly intact without pain or crepitus, with the exception of the first MPJ right foot. Manual muscle strength is within normal limits bilaterally. All dorsiflexors, plantarflexors, invertors, evertors are intact bilaterally. Tenderness present to the plantar medial aspect of both heels and the first MPJ right foot on palpation. Tenderness to the first MPJ right foot with range of motion.  There is a large firm palpable subcutaneous mass on the medial aspect of the head of the first metatarsal right foot.  Calf is soft/non-tender without warmth/induration    Imaging:         No results found.         TREATMENT:  I have recommended a new pair of orthotics.  The patient is to return to the clinic as needed.        Alfonzo Haney; BOOKER  [facility]         Again, thank you for allowing me to participate in the care of your patient.        Sincerely,        Alfonzo Haney DPM

## 2022-07-13 ENCOUNTER — OFFICE VISIT (OUTPATIENT)
Dept: CARDIOLOGY | Facility: CLINIC | Age: 77
End: 2022-07-13
Attending: FAMILY MEDICINE
Payer: COMMERCIAL

## 2022-07-13 VITALS
SYSTOLIC BLOOD PRESSURE: 126 MMHG | RESPIRATION RATE: 16 BRPM | WEIGHT: 142 LBS | BODY MASS INDEX: 26.13 KG/M2 | DIASTOLIC BLOOD PRESSURE: 62 MMHG | HEART RATE: 117 BPM | HEIGHT: 62 IN

## 2022-07-13 DIAGNOSIS — R00.0 TACHYCARDIA: ICD-10-CM

## 2022-07-13 LAB
ATRIAL RATE - MUSE: 101 BPM
DIASTOLIC BLOOD PRESSURE - MUSE: NORMAL MMHG
INTERPRETATION ECG - MUSE: NORMAL
P AXIS - MUSE: 67 DEGREES
PR INTERVAL - MUSE: 132 MS
QRS DURATION - MUSE: 70 MS
QT - MUSE: 354 MS
QTC - MUSE: 459 MS
R AXIS - MUSE: 28 DEGREES
SYSTOLIC BLOOD PRESSURE - MUSE: NORMAL MMHG
T AXIS - MUSE: 46 DEGREES
VENTRICULAR RATE- MUSE: 101 BPM

## 2022-07-13 PROCEDURE — 93000 ELECTROCARDIOGRAM COMPLETE: CPT | Performed by: GENERAL ACUTE CARE HOSPITAL

## 2022-07-13 PROCEDURE — 99214 OFFICE O/P EST MOD 30 MIN: CPT | Performed by: INTERNAL MEDICINE

## 2022-07-13 NOTE — PROGRESS NOTES
CARDIOLOGY RAPID ACCESS CLINIC CONSULT NOTE     Assessment/Plan:   1.  Sinus tachycardia.  At times seems inappropriate, occurring at rest.  Etiology uncertain.  We will check 24-hour Holter monitor to correlate symptoms with activities and assess resting heart rate.  We will also check an echocardiogram looking for structural abnormalities.  2.  Hypertension with good control today.  3.  Hyperlipidemia on treatment.    Follow-up dependent upon findings.     History of Present Illness:     It is my pleasure to see Nicole Marcial at the [facility] RAPID ACCESS clinic for evaluation of palpitations.    Nicole Marcial is a 77 year old female with a past medical history of hypertension, degenerative joint disease, hyperlipidemia, CKD 3A.  She reports that since her teens she is tended to have frequent racing of her heart, though it has not kept her from being active.  She does [employer] class several times a week and await cycle training.  She recently got a apple watch after she was noted by her orthopedist to have a resting tachycardia around 120 beats a minute.  This watch has notified her of heart rates in the 130s while seated at rest.    She has no history of atrial fibrillation.  She is occasionally aware of the racing heart rate but not until she had the watch which she notified of its frequency.  She now has multiple daily episodes of heart racing and feels that her heart is fast with minimal activities.  At rest however her heart rate can be in slow as the 80s.  She has noticed some slight upper left chest discomfort and slight lightheadedness on occasion, this is not necessarily related to activities or heart racing.  She does work to keep her self well-hydrated.  Event monitor in 2016 correlated symptoms only with normal sinus rhythm.      Past Medical History:     Patient Active Problem List   Diagnosis     Hypertension     Lower Back Pain     Herpes Simplex Type I     Esophageal  Reflux     Renal Cyst     Fibromyalgia     Taking Female Hormones For Postmenopausal HRT     Allergic Rhinitis     Lumbar radiculopathy     Intrinsic eczema     Diarrhea     Serum Enzyme Levels - Lipase Elevated     Osteoporosis Senile     Herniated Disc (L4 - L5) Right     Mixed hyperlipidemia     Selective deficiency of IgG (H)     Arthralgias In Multiple Sites     Temperature Intolerance To Cold   (Consistent)     Chronic Pain     Drug allergy     Guerrero's neuroma of left foot     Anxiety     Primary insomnia     Recurrent cold sores     Mild intermittent asthma without complication     Chronic kidney disease, stage 3 (H)     Sciatica     Polyp of duodenum     Polyp of colon     Nonspecific abdominal pain     History of colonic polyps     Gastro-esophageal reflux disease with esophagitis     Diverticular disease of large intestine     Contact dermatitis and other eczema, due to unspecified cause     Bilateral plantar fasciitis     Benign neoplasm of transverse colon     Mixed hearing loss, bilateral       Past Surgical History:     Past Surgical History:   Procedure Laterality Date     BACK SURGERY      x3     BUNIONECTOMY       OTHER SURGICAL HISTORY N/A 08/16/2016    parathyroidectomyDr. Creek     RELEASE TRIGGER FINGER       SHOULDER SURGERY       SINUS SURGERY       TONSILLECTOMY         Family History:     Family History   Problem Relation Age of Onset     Osteoporosis Mother      Heart Disease Father      Hyperlipidemia Father      Anxiety Disorder Father      Substance Abuse Father      Sleep Apnea Sister      Snoring Sister      Heart Disease Sister      No Known Problems Daughter      No Known Problems Maternal Grandmother      No Known Problems Maternal Grandfather      No Known Problems Paternal Grandmother      No Known Problems Paternal Grandfather      No Known Problems Maternal Aunt      No Known Problems Paternal Aunt      Hypertension Brother      Depression Sister      Mental Illness Brother       Hereditary Breast and Ovarian Cancer Syndrome No family hx of      Breast Cancer No family hx of      Cancer No family hx of      Colon Cancer No family hx of      Endometrial Cancer No family hx of      Ovarian Cancer No family hx of      Family history reviewed and is not pertinent to the chief complaint or presenting problem    Social History:    reports that she has never smoked. She has never used smokeless tobacco. She reports current alcohol use of about 6.0 standard drinks of alcohol per week. She reports previous drug use. Drug: Marijuana.    Exercise: Does Symone class with heart rate in the 130s, does weight training exercises with heart rates in the 130s.  Stable activity tolerance    Sleep: Mostly restorative.  Now more napping    Meds:     Current Outpatient Medications   Medication Sig Dispense Refill     acyclovir (ZOVIRAX) 5 % external ointment Apply topically 5 times daily 30 g 1     amitriptyline (ELAVIL) 25 MG tablet Take 3 tablets (75 mg) by mouth At Bedtime (Patient taking differently: Take 75 mg by mouth At Bedtime Patient taking 1 tablet at bedtime) 270 tablet 3     amLODIPine (NORVASC) 5 MG tablet [AMLODIPINE (NORVASC) 5 MG TABLET] TAKE ONE TABLET BY MOUTH EVERY DAY 90 tablet 3     atorvastatin (LIPITOR) 10 MG tablet Take 1 tablet (10 mg) by mouth daily 90 tablet 3     busPIRone (BUSPAR) 15 MG tablet TAKE 1 AND 1/2 TABLETS BY MOUTH TWO TIMES A  tablet 1     cholecalciferol, vitamin D3, 1,000 unit tablet [CHOLECALCIFEROL, VITAMIN D3, 1,000 UNIT TABLET] Take 2,000 Units by mouth daily.        estradiol-norethindrone (ACTIVELLA) 1-0.5 MG tablet Take 1 tablet by mouth daily 84 tablet 3     famotidine (FOR PEPCID) 10 MG tablet [FAMOTIDINE (FOR PEPCID) 10 MG TABLET] Take 20 mg by mouth daily. Takes two at night , and one in the morning        loratadine (CLARITIN) 10 mg tablet Take 10 mg by mouth as needed       VENTOLIN  (90 Base) MCG/ACT inhaler [VENTOLIN HFA 90 MCG/ACTUATION  "INHALER] INHALE 2 PUFFS BY MOUTH EVERY 6 HOURS AS NEEDED FOR WHEEZING OR SHORTNESS OF BREATH (Patient taking differently: as needed [VENTOLIN HFA 90 MCG/ACTUATION INHALER] INHALE 2 PUFFS BY MOUTH EVERY 6 HOURS AS NEEDED FOR WHEEZING OR SHORTNESS OF BREATH) 18 g 4       Allergies:   Adhesive [mecrylate], Aspirin (tartrazine only) [tartrazine], Bee venom, Cefuroxime axetil [cefuroxime], Cephalosporins, Chloral hydrate analogues [chloral hydrate], Chlorhexidine gluconate [chlorhexidine], Clindamycin, Codeine, Dairy [lac bovis], Doxepin, Ergot alkaloids [ergoloid mesylates], Erythromycin base [erythromycin], Formaldehyde, Gluten [gluten meal], Hydroxyzine, Latex, Morphine, Nickel, Nortriptyline, Pregabalin, Pseudoephedrine, Sulfamethoxazole, Terconazole, Tramadol, Trazodone, Trimethoprim, Vancomycin, Venlafaxine, Vistaril [hydroxyzine], Menthyl valerate, Metronidazole, and Quaternium-15    Review of Systems:     Positive for weight gain, hearing loss, irregular heartbeat, chest pain, heartburn, joint pains, muscle pain, rash, daytime sleepiness, anxiety.  12 point review of systems otherwise negative     Please refer above for cardiac ROS details.       Objective:      Physical Exam  64.4 kg (142 lb)  1.575 m (5' 2\")  Body mass index is 25.97 kg/m .  /62 (BP Location: Left arm, Patient Position: Sitting, Cuff Size: Adult Regular)   Pulse 117   Resp 16   Ht 1.575 m (5' 2\")   Wt 64.4 kg (142 lb)   BMI 25.97 kg/m          General Appearance : Awake, Alert, No acute distress  HEENT: No Scleral icterus; the mucous membranes were pink and moist.  Conjunctivae not injected  Neck:  No cervical bruits, jugular venous distention, or thyromegaly   Chest: The spine was straight. Chest wall symmetric  Lungs: Respirations unlabored; the lungs are clear to auscultation.  No wheezing   Cardiovascular:   Normal point of maximal impulse.  Auscultation reveals regular, rapid first and second heart sounds with 2 out of 6 short " systolic murmur at left lower sternal border.  Carotid, radial, and dorsalis pedal pulses are intact and symmetric.    Abdomen: Rounded.  No organomegaly, masses, bruits, or tenderness. Bowels sounds are present  Extremities: No edema  Skin: No xanthelasma. Warm, Dry.  Musculoskeletal: No tenderness.  Neurologic: Alert and oriented ×3. Speech is fluent.      EKG (personally reviewed):  October 8, 2020: Sinus tachycardia 118 bpm.  Otherwise normal ECG  ECG today: Sinus tachycardia 101 bpm.  Low voltage limb leads.  Otherwise normal ECG    Cardiac Imaging Studies:  Stress echocardiogram 2010: Normal stress echo with 8 minutes and 9 seconds on Santosh protocol.        Lab Review   Lab Results   Component Value Date     05/09/2022    CO2 22 05/09/2022    BUN 14 05/09/2022     Lab Results   Component Value Date    WBC 8.1 05/10/2021    HGB 13.5 05/09/2022    HCT 39.8 05/10/2021    MCV 93 05/10/2021     05/10/2021     Lab Results   Component Value Date    CHOL 160 05/09/2022    TRIG 69 05/09/2022    HDL 60 05/09/2022     No results found for: TROPONINI  No results found for: BNP  Lab Results   Component Value Date    TSH 2.55 10/08/2020       Ramesh Ellis MD, MD FACC      This note created using Dragon voice recognition software. Sound alike errors may have escaped editing.

## 2022-07-13 NOTE — PATIENT INSTRUCTIONS
We will schedule a 24-hour Holter monitor to help quantify the amount of fast heartbeat you are experiencing and look for any abnormal rhythms.  The more information you give us on the diary, the better, to correlate your symptoms with rhythm problems.   We will check a echocardiogram looking for structural abnormalities that may contribute to your fast heart rhythm.  If the testing looks good, you can return to full activities without limitations.  Follow-up will be dependent upon test results.

## 2022-07-13 NOTE — LETTER
7/13/2022    Amirah Moran MD  [address]  [facility] [address]    RE: Nicole Marcial       Dear Colleague,     I had the pleasure of seeing Nicole Marcial in the [facility].    CARDIOLOGY RAPID ACCESS CLINIC CONSULT NOTE     Assessment/Plan:   1.  Sinus tachycardia.  At times seems inappropriate, occurring at rest.  Etiology uncertain.  We will check 24-hour Holter monitor to correlate symptoms with activities and assess resting heart rate.  We will also check an echocardiogram looking for structural abnormalities.  2.  Hypertension with good control today.  3.  Hyperlipidemia on treatment.    Follow-up dependent upon findings.     History of Present Illness:     It is my pleasure to see Nicole Marcial at the [facility] RAPID ACCESS clinic for evaluation of palpitations.    Nicole Marcial is a 77 year old female with a past medical history of hypertension, degenerative joint disease, hyperlipidemia, CKD 3A.  She reports that since her teens she is tended to have frequent racing of her heart, though it has not kept her from being active.  She does Symone class several times a week and await cycle training.  She recently got a apple watch after she was noted by her orthopedist to have a resting tachycardia around 120 beats a minute.  This watch has notified her of heart rates in the 130s while seated at rest.    She has no history of atrial fibrillation.  She is occasionally aware of the racing heart rate but not until she had the watch which she notified of its frequency.  She now has multiple daily episodes of heart racing and feels that her heart is fast with minimal activities.  At rest however her heart rate can be in slow as the 80s.  She has noticed some slight upper left chest discomfort and slight lightheadedness on occasion, this is not necessarily related to activities or heart racing.  She does work to keep her self well-hydrated.  Event monitor  in 2016 correlated symptoms only with normal sinus rhythm.      Past Medical History:     Patient Active Problem List   Diagnosis     Hypertension     Lower Back Pain     Herpes Simplex Type I     Esophageal Reflux     Renal Cyst     Fibromyalgia     Taking Female Hormones For Postmenopausal HRT     Allergic Rhinitis     Lumbar radiculopathy     Intrinsic eczema     Diarrhea     Serum Enzyme Levels - Lipase Elevated     Osteoporosis Senile     Herniated Disc (L4 - L5) Right     Mixed hyperlipidemia     Selective deficiency of IgG (H)     Arthralgias In Multiple Sites     Temperature Intolerance To Cold   (Consistent)     Chronic Pain     Drug allergy     Guerrero's neuroma of left foot     Anxiety     Primary insomnia     Recurrent cold sores     Mild intermittent asthma without complication     Chronic kidney disease, stage 3 (H)     Sciatica     Polyp of duodenum     Polyp of colon     Nonspecific abdominal pain     History of colonic polyps     Gastro-esophageal reflux disease with esophagitis     Diverticular disease of large intestine     Contact dermatitis and other eczema, due to unspecified cause     Bilateral plantar fasciitis     Benign neoplasm of transverse colon     Mixed hearing loss, bilateral       Past Surgical History:     Past Surgical History:   Procedure Laterality Date     BACK SURGERY      x3     BUNIONECTOMY       OTHER SURGICAL HISTORY N/A 08/16/2016    parathyroidectomyDr. Barnum     RELEASE TRIGGER FINGER       SHOULDER SURGERY       SINUS SURGERY       TONSILLECTOMY         Family History:     Family History   Problem Relation Age of Onset     Osteoporosis Mother      Heart Disease Father      Hyperlipidemia Father      Anxiety Disorder Father      Substance Abuse Father      Sleep Apnea Sister      Snoring Sister      Heart Disease Sister      No Known Problems Daughter      No Known Problems Maternal Grandmother      No Known Problems Maternal Grandfather      No Known Problems Paternal  Grandmother      No Known Problems Paternal Grandfather      No Known Problems Maternal Aunt      No Known Problems Paternal Aunt      Hypertension Brother      Depression Sister      Mental Illness Brother      Hereditary Breast and Ovarian Cancer Syndrome No family hx of      Breast Cancer No family hx of      Cancer No family hx of      Colon Cancer No family hx of      Endometrial Cancer No family hx of      Ovarian Cancer No family hx of      Family history reviewed and is not pertinent to the chief complaint or presenting problem    Social History:    reports that she has never smoked. She has never used smokeless tobacco. She reports current alcohol use of about 6.0 standard drinks of alcohol per week. She reports previous drug use. Drug: Marijuana.    Exercise: Does Symone class with heart rate in the 130s, does weight training exercises with heart rates in the 130s.  Stable activity tolerance    Sleep: Mostly restorative.  Now more napping    Meds:     Current Outpatient Medications   Medication Sig Dispense Refill     acyclovir (ZOVIRAX) 5 % external ointment Apply topically 5 times daily 30 g 1     amitriptyline (ELAVIL) 25 MG tablet Take 3 tablets (75 mg) by mouth At Bedtime (Patient taking differently: Take 75 mg by mouth At Bedtime Patient taking 1 tablet at bedtime) 270 tablet 3     amLODIPine (NORVASC) 5 MG tablet [AMLODIPINE (NORVASC) 5 MG TABLET] TAKE ONE TABLET BY MOUTH EVERY DAY 90 tablet 3     atorvastatin (LIPITOR) 10 MG tablet Take 1 tablet (10 mg) by mouth daily 90 tablet 3     busPIRone (BUSPAR) 15 MG tablet TAKE 1 AND 1/2 TABLETS BY MOUTH TWO TIMES A  tablet 1     cholecalciferol, vitamin D3, 1,000 unit tablet [CHOLECALCIFEROL, VITAMIN D3, 1,000 UNIT TABLET] Take 2,000 Units by mouth daily.        estradiol-norethindrone (ACTIVELLA) 1-0.5 MG tablet Take 1 tablet by mouth daily 84 tablet 3     famotidine (FOR PEPCID) 10 MG tablet [FAMOTIDINE (FOR PEPCID) 10 MG TABLET] Take 20 mg by  "mouth daily. Takes two at night , and one in the morning        loratadine (CLARITIN) 10 mg tablet Take 10 mg by mouth as needed       VENTOLIN  (90 Base) MCG/ACT inhaler [VENTOLIN HFA 90 MCG/ACTUATION INHALER] INHALE 2 PUFFS BY MOUTH EVERY 6 HOURS AS NEEDED FOR WHEEZING OR SHORTNESS OF BREATH (Patient taking differently: as needed [VENTOLIN HFA 90 MCG/ACTUATION INHALER] INHALE 2 PUFFS BY MOUTH EVERY 6 HOURS AS NEEDED FOR WHEEZING OR SHORTNESS OF BREATH) 18 g 4       Allergies:   Adhesive [mecrylate], Aspirin (tartrazine only) [tartrazine], Bee venom, Cefuroxime axetil [cefuroxime], Cephalosporins, Chloral hydrate analogues [chloral hydrate], Chlorhexidine gluconate [chlorhexidine], Clindamycin, Codeine, Dairy [lac bovis], Doxepin, Ergot alkaloids [ergoloid mesylates], Erythromycin base [erythromycin], Formaldehyde, Gluten [gluten meal], Hydroxyzine, Latex, Morphine, Nickel, Nortriptyline, Pregabalin, Pseudoephedrine, Sulfamethoxazole, Terconazole, Tramadol, Trazodone, Trimethoprim, Vancomycin, Venlafaxine, Vistaril [hydroxyzine], Menthyl valerate, Metronidazole, and Quaternium-15    Review of Systems:     Positive for weight gain, hearing loss, irregular heartbeat, chest pain, heartburn, joint pains, muscle pain, rash, daytime sleepiness, anxiety.  12 point review of systems otherwise negative     Please refer above for cardiac ROS details.       Objective:      Physical Exam  64.4 kg (142 lb)  1.575 m (5' 2\")  Body mass index is 25.97 kg/m .  /62 (BP Location: Left arm, Patient Position: Sitting, Cuff Size: Adult Regular)   Pulse 117   Resp 16   Ht 1.575 m (5' 2\")   Wt 64.4 kg (142 lb)   BMI 25.97 kg/m          General Appearance : Awake, Alert, No acute distress  HEENT: No Scleral icterus; the mucous membranes were pink and moist.  Conjunctivae not injected  Neck:  No cervical bruits, jugular venous distention, or thyromegaly   Chest: The spine was straight. Chest wall symmetric  Lungs: " Respirations unlabored; the lungs are clear to auscultation.  No wheezing   Cardiovascular:   Normal point of maximal impulse.  Auscultation reveals regular, rapid first and second heart sounds with 2 out of 6 short systolic murmur at left lower sternal border.  Carotid, radial, and dorsalis pedal pulses are intact and symmetric.    Abdomen: Rounded.  No organomegaly, masses, bruits, or tenderness. Bowels sounds are present  Extremities: No edema  Skin: No xanthelasma. Warm, Dry.  Musculoskeletal: No tenderness.  Neurologic: Alert and oriented ×3. Speech is fluent.      EKG (personally reviewed):  October 8, 2020: Sinus tachycardia 118 bpm.  Otherwise normal ECG  ECG today: Sinus tachycardia 101 bpm.  Low voltage limb leads.  Otherwise normal ECG    Cardiac Imaging Studies:  Stress echocardiogram 2010: Normal stress echo with 8 minutes and 9 seconds on Santosh protocol.        Lab Review   Lab Results   Component Value Date     05/09/2022    CO2 22 05/09/2022    BUN 14 05/09/2022     Lab Results   Component Value Date    WBC 8.1 05/10/2021    HGB 13.5 05/09/2022    HCT 39.8 05/10/2021    MCV 93 05/10/2021     05/10/2021     Lab Results   Component Value Date    CHOL 160 05/09/2022    TRIG 69 05/09/2022    HDL 60 05/09/2022     No results found for: TROPONINI  No results found for: BNP  Lab Results   Component Value Date    TSH 2.55 10/08/2020       Ramesh Ellis MD, MD [facility]      This note created using Dragon voice recognition software. Sound alike errors may have escaped editing.       Thank you for allowing me to participate in the care of your patient.      Sincerely,     Ramesh Ellis MD     [facility]  cc:   Amirah Moran MD  [address]  [address] [address]

## 2022-07-21 ENCOUNTER — HOSPITAL ENCOUNTER (OUTPATIENT)
Dept: CARDIOLOGY | Facility: CLINIC | Age: 77
Discharge: HOME | End: 2022-07-21
Attending: INTERNAL MEDICINE
Payer: COMMERCIAL

## 2022-07-21 DIAGNOSIS — R00.0 TACHYCARDIA: ICD-10-CM

## 2022-07-21 LAB — LVEF ECHO: NORMAL

## 2022-07-21 PROCEDURE — 93226 XTRNL ECG REC<48 HR SCAN A/R: CPT

## 2022-07-21 PROCEDURE — 93306 TTE W/DOPPLER COMPLETE: CPT | Mod: 26 | Performed by: INTERNAL MEDICINE

## 2022-07-21 PROCEDURE — 93306 TTE W/DOPPLER COMPLETE: CPT

## 2022-07-23 PROCEDURE — 93227 XTRNL ECG REC<48 HR R&I: CPT | Performed by: INTERNAL MEDICINE

## 2022-08-04 DIAGNOSIS — F51.01 PRIMARY INSOMNIA: ICD-10-CM

## 2022-08-05 NOTE — TELEPHONE ENCOUNTER
"Routing refill request to provider for review/approval because:  Allergy/contraindication  Early refill request    Last Written Prescription Date:  10/27/2021  Last Fill Quantity: 270,  # refills: 3   Last office visit provider:  5/9/2022      Requested Prescriptions   Pending Prescriptions Disp Refills     amitriptyline (ELAVIL) 25 MG tablet [Pharmacy Med Name: AMITRIPTYLINE HCL 25MG TABS] 270 tablet 1     Sig: TAKE 2 TABS. BY MOUTH AT BEDTIME MAY INCREASE TO 3 TABS. IF TOLERATED       Tricyclic Agents ( Annual appt and no PHQ9) Passed - 8/4/2022  5:06 PM        Passed - Blood Pressure under 140/90 in past 12 mos     BP Readings from Last 3 Encounters:   07/13/22 126/62   05/09/22 130/60   11/11/21 (!) 146/76                 Passed - Recent (12 mo) or future (30 days) visit within authorizing provider's specialty     Patient has had an office visit with the authorizing provider or a provider within the authorizing providers department within the previous 12 mos or has a future within next 30 days. See \"Patient Info\" tab in inbasket, or \"Choose Columns\" in Meds & Orders section of the refill encounter.              Passed - Medication is active on med list        Passed - Patient is age 18 or older        Passed - Patient is not pregnant        Passed - No positive pregnancy test on record in past 12 mos             Lis Shea RN 08/05/22 3:37 PM  "

## 2022-09-11 ENCOUNTER — HEALTH MAINTENANCE LETTER (OUTPATIENT)
Age: 77
End: 2022-09-11

## 2022-09-16 ENCOUNTER — MYC MEDICAL ADVICE (OUTPATIENT)
Dept: FAMILY MEDICINE | Facility: CLINIC | Age: 77
End: 2022-09-16

## 2022-09-16 DIAGNOSIS — N63.0 LUMP OR MASS IN BREAST: Primary | ICD-10-CM

## 2022-09-16 DIAGNOSIS — D48.62 NEOPLASM OF UNCERTAIN BEHAVIOR OF LEFT BREAST: ICD-10-CM

## 2022-09-22 ENCOUNTER — HOSPITAL ENCOUNTER (OUTPATIENT)
Dept: MAMMOGRAPHY | Facility: CLINIC | Age: 77
Discharge: HOME | End: 2022-09-22
Attending: FAMILY MEDICINE
Payer: COMMERCIAL

## 2022-09-22 DIAGNOSIS — D48.62 NEOPLASM OF UNCERTAIN BEHAVIOR OF LEFT BREAST: ICD-10-CM

## 2022-09-22 DIAGNOSIS — N63.0 LUMP OR MASS IN BREAST: ICD-10-CM

## 2022-09-22 PROCEDURE — 77066 DX MAMMO INCL CAD BI: CPT

## 2022-09-22 PROCEDURE — 76642 ULTRASOUND BREAST LIMITED: CPT | Mod: LT

## 2022-09-27 ASSESSMENT — ASTHMA QUESTIONNAIRES
QUESTION_4 LAST FOUR WEEKS HOW OFTEN HAVE YOU USED YOUR RESCUE INHALER OR NEBULIZER MEDICATION (SUCH AS ALBUTEROL): ONCE A WEEK OR LESS
QUESTION_1 LAST FOUR WEEKS HOW MUCH OF THE TIME DID YOUR ASTHMA KEEP YOU FROM GETTING AS MUCH DONE AT WORK, SCHOOL OR AT HOME: NONE OF THE TIME
QUESTION_2 LAST FOUR WEEKS HOW OFTEN HAVE YOU HAD SHORTNESS OF BREATH: NOT AT ALL
ACT_TOTALSCORE: 23
ACT_TOTALSCORE: 23
QUESTION_3 LAST FOUR WEEKS HOW OFTEN DID YOUR ASTHMA SYMPTOMS (WHEEZING, COUGHING, SHORTNESS OF BREATH, CHEST TIGHTNESS OR PAIN) WAKE YOU UP AT NIGHT OR EARLIER THAN USUAL IN THE MORNING: NOT AT ALL
QUESTION_5 LAST FOUR WEEKS HOW WOULD YOU RATE YOUR ASTHMA CONTROL: WELL CONTROLLED

## 2022-09-28 ENCOUNTER — OFFICE VISIT (OUTPATIENT)
Dept: FAMILY MEDICINE | Facility: CLINIC | Age: 77
End: 2022-09-28
Payer: COMMERCIAL

## 2022-09-28 VITALS
SYSTOLIC BLOOD PRESSURE: 130 MMHG | WEIGHT: 143.7 LBS | HEART RATE: 84 BPM | DIASTOLIC BLOOD PRESSURE: 76 MMHG | BODY MASS INDEX: 26.28 KG/M2 | OXYGEN SATURATION: 98 % | TEMPERATURE: 98.5 F

## 2022-09-28 DIAGNOSIS — D80.4 IGM DEFICIENCY (H): ICD-10-CM

## 2022-09-28 DIAGNOSIS — I10 ESSENTIAL HYPERTENSION: ICD-10-CM

## 2022-09-28 DIAGNOSIS — F41.9 ANXIETY: ICD-10-CM

## 2022-09-28 DIAGNOSIS — R29.898 LEG WEAKNESS, BILATERAL: ICD-10-CM

## 2022-09-28 DIAGNOSIS — R21 RASH AND NONSPECIFIC SKIN ERUPTION: ICD-10-CM

## 2022-09-28 DIAGNOSIS — R00.0 TACHYCARDIA: ICD-10-CM

## 2022-09-28 DIAGNOSIS — E78.2 MIXED HYPERLIPIDEMIA: ICD-10-CM

## 2022-09-28 DIAGNOSIS — R39.9 LOWER URINARY TRACT SYMPTOMS: Primary | ICD-10-CM

## 2022-09-28 DIAGNOSIS — D80.3 SELECTIVE DEFICIENCY OF IGG (H): ICD-10-CM

## 2022-09-28 DIAGNOSIS — F51.01 PRIMARY INSOMNIA: ICD-10-CM

## 2022-09-28 LAB
ALBUMIN SERPL BCG-MCNC: 4.6 G/DL (ref 3.5–5.2)
ALBUMIN UR-MCNC: NEGATIVE MG/DL
ALP SERPL-CCNC: 58 U/L (ref 35–104)
ALT SERPL W P-5'-P-CCNC: 28 U/L (ref 10–35)
ANION GAP SERPL CALCULATED.3IONS-SCNC: 13 MMOL/L (ref 7–15)
APPEARANCE UR: CLEAR
AST SERPL W P-5'-P-CCNC: 39 U/L (ref 10–35)
BACTERIA #/AREA URNS HPF: ABNORMAL /HPF
BILIRUB DIRECT SERPL-MCNC: <0.2 MG/DL (ref 0–0.3)
BILIRUB SERPL-MCNC: 0.4 MG/DL
BILIRUB UR QL STRIP: NEGATIVE
BUN SERPL-MCNC: 16.9 MG/DL (ref 8–23)
CALCIUM SERPL-MCNC: 9.6 MG/DL (ref 8.8–10.2)
CHLORIDE SERPL-SCNC: 103 MMOL/L (ref 98–107)
CHOLEST SERPL-MCNC: 219 MG/DL
CK SERPL-CCNC: 68 U/L (ref 26–192)
COLOR UR AUTO: YELLOW
CREAT SERPL-MCNC: 1.04 MG/DL (ref 0.51–0.95)
DEPRECATED HCO3 PLAS-SCNC: 25 MMOL/L (ref 22–29)
ERYTHROCYTE [DISTWIDTH] IN BLOOD BY AUTOMATED COUNT: 13.1 % (ref 10–15)
GFR SERPL CREATININE-BSD FRML MDRD: 55 ML/MIN/1.73M2
GLUCOSE SERPL-MCNC: 97 MG/DL (ref 70–99)
GLUCOSE UR STRIP-MCNC: NEGATIVE MG/DL
HCT VFR BLD AUTO: 45 % (ref 35–47)
HDLC SERPL-MCNC: 71 MG/DL
HGB BLD-MCNC: 14.9 G/DL (ref 11.7–15.7)
HGB UR QL STRIP: ABNORMAL
KETONES UR STRIP-MCNC: NEGATIVE MG/DL
LDLC SERPL CALC-MCNC: 132 MG/DL
LEUKOCYTE ESTERASE UR QL STRIP: ABNORMAL
MCH RBC QN AUTO: 32 PG (ref 26.5–33)
MCHC RBC AUTO-ENTMCNC: 33.1 G/DL (ref 31.5–36.5)
MCV RBC AUTO: 97 FL (ref 78–100)
NITRATE UR QL: NEGATIVE
NONHDLC SERPL-MCNC: 148 MG/DL
PH UR STRIP: 7 [PH] (ref 5–8)
PLATELET # BLD AUTO: 213 10E3/UL (ref 150–450)
POTASSIUM SERPL-SCNC: 4.4 MMOL/L (ref 3.4–5.3)
PROT SERPL-MCNC: 7.3 G/DL (ref 6.4–8.3)
RBC # BLD AUTO: 4.66 10E6/UL (ref 3.8–5.2)
RBC #/AREA URNS AUTO: ABNORMAL /HPF
SODIUM SERPL-SCNC: 141 MMOL/L (ref 136–145)
SP GR UR STRIP: 1.01 (ref 1–1.03)
SQUAMOUS #/AREA URNS AUTO: ABNORMAL /LPF
TRIGL SERPL-MCNC: 78 MG/DL
UROBILINOGEN UR STRIP-ACNC: 0.2 E.U./DL
WBC # BLD AUTO: 10.2 10E3/UL (ref 4–11)
WBC #/AREA URNS AUTO: ABNORMAL /HPF

## 2022-09-28 PROCEDURE — 80053 COMPREHEN METABOLIC PANEL: CPT | Performed by: FAMILY MEDICINE

## 2022-09-28 PROCEDURE — 85027 COMPLETE CBC AUTOMATED: CPT | Performed by: FAMILY MEDICINE

## 2022-09-28 PROCEDURE — 81001 URINALYSIS AUTO W/SCOPE: CPT | Performed by: FAMILY MEDICINE

## 2022-09-28 PROCEDURE — 82784 ASSAY IGA/IGD/IGG/IGM EACH: CPT | Performed by: FAMILY MEDICINE

## 2022-09-28 PROCEDURE — 82787 IGG 1 2 3 OR 4 EACH: CPT | Performed by: FAMILY MEDICINE

## 2022-09-28 PROCEDURE — 80061 LIPID PANEL: CPT | Performed by: FAMILY MEDICINE

## 2022-09-28 PROCEDURE — 82248 BILIRUBIN DIRECT: CPT | Performed by: FAMILY MEDICINE

## 2022-09-28 PROCEDURE — 99214 OFFICE O/P EST MOD 30 MIN: CPT | Performed by: FAMILY MEDICINE

## 2022-09-28 PROCEDURE — 82550 ASSAY OF CK (CPK): CPT | Performed by: FAMILY MEDICINE

## 2022-09-28 PROCEDURE — 36415 COLL VENOUS BLD VENIPUNCTURE: CPT | Performed by: FAMILY MEDICINE

## 2022-09-28 ASSESSMENT — PAIN SCALES - GENERAL: PAINLEVEL: NO PAIN (0)

## 2022-09-28 NOTE — PATIENT INSTRUCTIONS
Ok to try decreasing dose of buspirone    Ok to stop atorvastatin for a few weeks and see if this helps with leg weakness/discomfort

## 2022-09-28 NOTE — PROGRESS NOTES
Assessment & Plan     Lower urinary tract symptoms  We will check urine sample today.  Discussed that color change of urine could be due to propranolol.  Encouraged her to check with pharmacist about this as well  - UA Macro with Reflex to Micro and Culture - lab collect    Essential hypertension  Blood pressure is controlled with current medication regimen.  She will continue propranolol 60 mg daily  - Basic metabolic panel  (Ca, Cl, CO2, Creat, Gluc, K, Na, BUN)    Primary insomnia  Will increase dose of amitriptyline to 50 mg at bedtime  - amitriptyline (ELAVIL) 25 MG tablet  Dispense: 180 tablet; Refill: 3    Leg weakness, bilateral  We will check CK level today.  We will have her discontinue atorvastatin for a few weeks and see if this helps improve her leg weakness and discomfort.  Encouraged her to continue with her regular exercise regimen to increase her strength  - CK total    Selective deficiency of IgG (H)  We have been monitoring this on an annual basis.  We will check IgG levels today  - IgG subclasses    Mixed hyperlipidemia  Currently on atorvastatin.  Last lipid cascade was in May.  We will recheck today.  We will have her discontinue atorvastatin due to concern about leg discomfort and weakness.  Consider rosuvastatin if the symptoms improve while off of atorvastatin.  - Lipid panel reflex to direct LDL Fasting  - Hepatic panel (Albumin, ALT, AST, Bili, Alk Phos, TP)    Rash and nonspecific skin eruption  Unclear etiology.  Check CBC today.  If worsens, would recommend dermatology consult  - CBC with platelets    Tachycardia  Doing well on propranolol 60 mg daily.  She will follow-up with cardiology    Anxiety  She is now on propranolol.  She is also on buspirone.  We will have her try decreasing dose of buspirone.  If anxiety increases she can return to previous dose of buspirone                   No follow-ups on file.    Amirah Moran MD  [facility]    Nicole is a 77 year old, presenting for the following health issues:  Hypertension, Lipids, Urinary Problem (Urine is yellow since starting propranolol), and sleep issue (Would like an Increase of amitriptyline - two tablets)      History of Present Illness       CKD: She uses over the counter pain medication, including Tylenol, a few times a week.    Vascular Disease:  She presents for follow up of vascular disease.  She never takes nitroglycerin. She is not taking daily aspirin.    She eats 4 or more servings of fruits and vegetables daily.She consumes 0 sweetened beverage(s) daily.She exercises with enough effort to increase her heart rate 30 to 60 minutes per day.  She exercises with enough effort to increase her heart rate 7 days per week. She is missing 1 dose(s) of medications per week.  She is not taking prescribed medications regularly due to remembering to take.     She comes in today to follow-up on several concerns.  She was recently seen by cardiology for sinus tachycardia.  She is now on propranolol 60 mg daily.  Doing fairly well with this medication.  Has noticed that her urine is more yellow in color since starting propranolol.  States that she feels like she is drinking adequate fluids.  She is not noticing any other urinary symptoms.  Does notice a little bit more fatigue since starting propranolol.  She knows that this medicine is also used for anxiety and she wonders if she should reduce her dose of buspirone or continue the current dosage.  She has also noticed a rash on both of her forearms.  Notices very small red spots.  At times had some fluid filled sex on the skin.  Those have improved.    She has noticed some weakness in her legs as well as some leg discomfort.  Was unable to exercise for about 5 weeks during the summer due to her tachycardia.  She is working on increasing her exercise and rebuilding her strength.  She wonders if some of this could be due to atorvastatin.    She has  history of insomnia.  She is currently on amitriptyline 25 mg at bedtime.  Previously took 50 mg but dose was reduced due to dry mouth.  States that the lower dose does not help her sleep so she has started taking 50 mg at bedtime again.  This is working well and she does not experience significant dry mouth.  Would like prescription to be written for the higher dose if possible.    She has stopped her hormone replacement.  Recently had a concern with a breast lump and did have diagnostic mammogram and ultrasound.  Fortunately there were no concerning findings but she decided that she would like to discontinue the hormone replacement.  She has noticed some increased hot flashes since doing so but is able to tolerate that.    She plans to get her influenza vaccine and COVID booster at [facility].          Review of Systems         Objective    /76 (BP Location: Right arm, Cuff Size: Adult Regular)   Pulse 84   Temp 98.5  F (36.9  C) (Temporal)   Wt 65.2 kg (143 lb 11.2 oz)   SpO2 98%   BMI 26.28 kg/m    Body mass index is 26.28 kg/m .  Physical Exam   GENERAL: healthy, alert and no distress  RESP: lungs clear to auscultation - no rales, rhonchi or wheezes  CV: regular rate and rhythm, normal S1 S2, no S3 or S4, no murmur, click or rub, no peripheral edema and peripheral pulses strong  MS: no gross musculoskeletal defects noted, no edema  SKIN: petechial type rash present bilateral forearms  PSYCH: mentation appears normal, affect normal/bright

## 2022-09-29 ASSESSMENT — ANXIETY QUESTIONNAIRES
5. BEING SO RESTLESS THAT IT IS HARD TO SIT STILL: NOT AT ALL
GAD7 TOTAL SCORE: 5
7. FEELING AFRAID AS IF SOMETHING AWFUL MIGHT HAPPEN: NOT AT ALL
2. NOT BEING ABLE TO STOP OR CONTROL WORRYING: SEVERAL DAYS
6. BECOMING EASILY ANNOYED OR IRRITABLE: SEVERAL DAYS
GAD7 TOTAL SCORE: 5
1. FEELING NERVOUS, ANXIOUS, OR ON EDGE: SEVERAL DAYS
3. WORRYING TOO MUCH ABOUT DIFFERENT THINGS: SEVERAL DAYS

## 2022-09-29 ASSESSMENT — PATIENT HEALTH QUESTIONNAIRE - PHQ9
SUM OF ALL RESPONSES TO PHQ QUESTIONS 1-9: 6
5. POOR APPETITE OR OVEREATING: SEVERAL DAYS

## 2022-09-30 DIAGNOSIS — E78.2 MIXED HYPERLIPIDEMIA: ICD-10-CM

## 2022-09-30 DIAGNOSIS — R31.21 ASYMPTOMATIC MICROSCOPIC HEMATURIA: Primary | ICD-10-CM

## 2022-09-30 LAB
IGG SERPL-MCNC: 858 MG/DL (ref 610–1616)
IGG1 SER-MCNC: 569 MG/DL (ref 382–929)
IGG2 SER-MCNC: 182 MG/DL (ref 242–700)
IGG3 SER-MCNC: 29 MG/DL (ref 22–176)
IGG4 SER-MCNC: 28 MG/DL (ref 4–86)
IGM SERPL-MCNC: 17 MG/DL (ref 35–242)
SUBCLASSES, PERCENT: 94 %

## 2022-09-30 RX ORDER — ATORVASTATIN CALCIUM 10 MG/1
TABLET, FILM COATED ORAL
Qty: 90 TABLET | Refills: 0 | Status: SHIPPED | OUTPATIENT
Start: 2022-09-30 | End: 2023-01-10

## 2022-10-01 NOTE — TELEPHONE ENCOUNTER
"Last Written Prescription Date:  11/3/2021  Last Fill Quantity: 90,  # refills: 3   Last office visit provider:  9/28/2022     Requested Prescriptions   Pending Prescriptions Disp Refills     atorvastatin (LIPITOR) 10 MG tablet [Pharmacy Med Name: ATORVASTATIN CALCIUM 10MG TABS] 90 tablet 0     Sig: TAKE ONE TABLET BY MOUTH EVERY DAY       Statins Protocol Passed - 9/30/2022  1:51 PM        Passed - LDL on file in past 12 months     Recent Labs   Lab Test 09/28/22  0954   *             Passed - No abnormal creatine kinase in past 12 months     Recent Labs   Lab Test 09/28/22  0954   CKT 68                Passed - Recent (12 mo) or future (30 days) visit within the authorizing provider's specialty     Patient has had an office visit with the authorizing provider or a provider within the authorizing providers department within the previous 12 mos or has a future within next 30 days. See \"Patient Info\" tab in inbasket, or \"Choose Columns\" in Meds & Orders section of the refill encounter.              Passed - Medication is active on med list        Passed - Patient is age 18 or older        Passed - No active pregnancy on record        Passed - No positive pregnancy test in past 12 months             Zeina José RN 09/30/22 11:12 PM  "

## 2022-10-20 ENCOUNTER — TRANSFERRED RECORDS (OUTPATIENT)
Dept: HEALTH INFORMATION MANAGEMENT | Facility: CLINIC | Age: 77
End: 2022-10-20

## 2022-10-21 ENCOUNTER — MYC MEDICAL ADVICE (OUTPATIENT)
Dept: FAMILY MEDICINE | Facility: CLINIC | Age: 77
End: 2022-10-21

## 2022-10-21 DIAGNOSIS — F41.9 ANXIETY: ICD-10-CM

## 2022-10-21 RX ORDER — BUSPIRONE HYDROCHLORIDE 15 MG/1
TABLET ORAL
Qty: 270 TABLET | Refills: 1 | Status: SHIPPED | OUTPATIENT
Start: 2022-10-21 | End: 2023-06-17

## 2022-10-24 ENCOUNTER — OFFICE VISIT (OUTPATIENT)
Dept: CARDIOLOGY | Facility: CLINIC | Age: 77
End: 2022-10-24
Attending: INTERNAL MEDICINE
Payer: COMMERCIAL

## 2022-10-24 VITALS
WEIGHT: 148 LBS | RESPIRATION RATE: 16 BRPM | BODY MASS INDEX: 27.07 KG/M2 | OXYGEN SATURATION: 96 % | SYSTOLIC BLOOD PRESSURE: 162 MMHG | HEART RATE: 78 BPM | DIASTOLIC BLOOD PRESSURE: 80 MMHG

## 2022-10-24 DIAGNOSIS — R07.89 ATYPICAL CHEST PAIN: ICD-10-CM

## 2022-10-24 DIAGNOSIS — G90.A POSTURAL ORTHOSTATIC TACHYCARDIA SYNDROME (POTS): Primary | ICD-10-CM

## 2022-10-24 DIAGNOSIS — R00.0 TACHYCARDIA: ICD-10-CM

## 2022-10-24 PROCEDURE — 99214 OFFICE O/P EST MOD 30 MIN: CPT | Performed by: INTERNAL MEDICINE

## 2022-10-24 NOTE — LETTER
10/24/2022    Amirah Moran MD  [address]  [facility] [address]    RE: Nicole Marcial       Dear Colleague,     I had the pleasure of seeing Nicole Marcial in the [facility].    Cardiology Clinic Office Note    Assessment / Plan:    1.  Postural orthostatic Tachycardia syndrome.  Improved on propranolol 60 mg daily.  2.  Vague exercise-induced chest discomfort with decreased activity recently.  Encouraged to get back to a regular moderate exercise regimen.  We will check a graded exercise test to exclude a coronary cause of her symptoms and give her confidence to resume full exercise without limitations.  3.  Hypertension.  Marginally elevated.  Advised to keep closer record    Follow-up for abnormalities on stress testing.  Otherwise 1 year    ______________________________________________________________________    Subjective:    I had the opportunity to see Nicole Marcial at the [facility]. Nicole Marcial is a 77 year old female with a history of hypertension, degenerative joint disease, hyperlipidemia, CKD 3A.  She reports that since her teens she is tended to have frequent racing of her heart, though it has not kept her from being active.  She was seen in consultation 7/2022 for complaints about resting tachycardia and frequent rapid heartbeat, despite efforts to maintain adequate hydration with activities.  There is no history of atrial fibrillation.    Subsequent Holter monitoring showed sinus rhythm with an average heart rate of 107 bpm, maximum heart rate of 134 bpm, and numerous symptoms associated with sinus tachycardia.  Endings were consistent with postural orthostatic tachycardia syndrome.  Echocardiogram showed normal function without significant valvular abnormalities.  She was started on propranolol LA 60 mg daily and returns today.  She states that her systolic blood pressures are generally in the 130s at  home.    She reports significant improvement in her racing heartbeat since starting the propranolol.  She still has occasional bouts but is able to carry on usual daily activities without difficulty.  She is not exercising as she was over the summer, and plans to travel to [place] for the winter.  Her sleep seems to be less restorative as she feels the need to nap often during the daytime.  She has been told she snores but no apnea has been reported.  She reports her  has sleep apnea.  She has had occasional vague chest discomfort when walking fast with her  though this is not predictable and does not limit her activities.    She has tried taking CBD oil which seems to help with her back and knee and hip pain but she feels may have contributed to 1 prolonged episode of heart rate racing.  She has had intermittently a purpuric rash over her forearms, currently this has resolved.  She seldom uses her Ventolin inhaler.  Her alcohol intake is minimal and rare.  She has had no syncope or falls.  She has had no paroxysmal nocturnal dyspnea orthopnea or night sweats.    ______________________________________________________________________    Problem List:  Patient Active Problem List   Diagnosis     Hypertension     Lower Back Pain     Herpes Simplex Type I     Esophageal Reflux     Renal Cyst     Fibromyalgia     Taking Female Hormones For Postmenopausal HRT     Allergic Rhinitis     Lumbar radiculopathy     Intrinsic eczema     Diarrhea     Serum Enzyme Levels - Lipase Elevated     Osteoporosis Senile     Herniated Disc (L4 - L5) Right     Mixed hyperlipidemia     Selective deficiency of IgG (H)     Arthralgias In Multiple Sites     Temperature Intolerance To Cold   (Consistent)     Chronic Pain     Drug allergy     Guerrero's neuroma of left foot     Anxiety     Primary insomnia     Recurrent cold sores     Mild intermittent asthma without complication     Chronic kidney disease, stage 3 (H)     Sciatica      Polyp of duodenum     Polyp of colon     Nonspecific abdominal pain     History of colonic polyps     Gastro-esophageal reflux disease with esophagitis     Diverticular disease of large intestine     Contact dermatitis and other eczema, due to unspecified cause     Bilateral plantar fasciitis     Benign neoplasm of transverse colon     Mixed hearing loss, bilateral     Medical History:  Past Medical History:   Diagnosis Date     Arthritis      Back pain      Chronic pain      Depressive disorder      Eczema      Fibromyalgia      Hypertension      Mild asthma      Nerve pain      Osteoporosis      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 Pala     RELEASE TRIGGER FINGER       SHOULDER SURGERY       SINUS SURGERY       TONSILLECTOMY       Social History:  Social History     Socioeconomic History     Marital status:      Spouse name: Not on file     Number of children: Not on file     Years of education: Not on file     Highest education level: Not on file   Occupational History     Not on file   Tobacco Use     Smoking status: Never     Smokeless tobacco: Never   Substance and Sexual Activity     Alcohol use: Yes     Alcohol/week: 6.0 standard drinks     Comment: Alcoholic Drinks/day: hard cider     Drug use: Not Currently     Types: Marijuana     Comment: Drug use: very rare     Sexual activity: Not Currently   Other Topics Concern     Not on file   Social History Narrative     Not on file     Social Determinants of Health     Financial Resource Strain: Not on file   Food Insecurity: Not on file   Transportation Needs: Not on file   Physical Activity: Not on file   Stress: Not on file   Social Connections: Not on file   Intimate Partner Violence: Not on file   Housing Stability: Not on file     Sleep  History:  Snores, generally restorative but naps often in the daytime.  No apnea has been reported.  Exercise History:  Walks 2 miles a day with her , but not doing the exercise that she wants was.     Review of Systems:       Positive for weight gain, hearing loss, chest pain, irregular heartbeat, leg swelling and feet swelling, joint pain, muscle weakness, muscle pain, rash, dizziness, loss of balance, daytime sleepiness, depression, anxiety.  12 point review of systems otherwise negative     Please refer above for cardiac ROS details.         Family History:  Family History   Problem Relation Age of Onset     Osteoporosis Mother      Heart Disease Father      Hyperlipidemia Father      Anxiety Disorder Father      Substance Abuse Father      Sleep Apnea Sister      Snoring Sister      Heart Disease Sister      No Known Problems Daughter      No Known Problems Maternal Grandmother      No Known Problems Maternal Grandfather      No Known Problems Paternal Grandmother      No Known Problems Paternal Grandfather      No Known Problems Maternal Aunt      No Known Problems Paternal Aunt      Hypertension Brother      Depression Sister      Mental Illness Brother      Hereditary Breast and Ovarian Cancer Syndrome No family hx of      Breast Cancer No family hx of      Cancer No family hx of      Colon Cancer No family hx of      Endometrial Cancer No family hx of      Ovarian Cancer No family hx of          Allergies:  Allergies   Allergen Reactions     Adhesive [Mecrylate] Unknown     Contact allergy     Aspirin (Tartrazine Only) [Tartrazine] Unknown     Abdominal pain       Bee Venom Unknown     Cefuroxime Axetil [Cefuroxime] Unknown     Rash     Cephalosporins Unknown     Rash--ceftin     Chloral Hydrate Analogues [Chloral Hydrate] Unknown     Contact allergy     Chlorhexidine Gluconate [Chlorhexidine] Unknown     Contact allergy     Clindamycin Unknown     C diff     Codeine Unknown     Dairy [Lac Bovis]  Unknown     Doxepin Unknown     hyper     Ergot Alkaloids [Ergoloid Mesylates] Unknown     Erythromycin Base [Erythromycin] Unknown     Stomach pain     Formaldehyde Hives     Gluten [Gluten Meal] Unknown     Celiac panel not positive, patient states has abdominal pain when she eats gluten     Hydroxyzine Unknown     Latex Unknown     Morphine Unknown     Not effective     Nickel Unknown     Nortriptyline Unknown     hyper     Pregabalin Unknown     Pseudoephedrine Unknown     Sulfamethoxazole      Terconazole Unknown     Tramadol Unknown     Trazodone Nausea and Vomiting     Trimethoprim      Vancomycin Diarrhea     Venlafaxine Unknown     Vistaril [Hydroxyzine] Unknown     Menthyl Valerate Rash     Metronidazole Rash     Rash and neuropathy     Quaternium-15 Rash     Medications:  Current Outpatient Medications   Medication Sig Dispense Refill     acyclovir (ZOVIRAX) 5 % external ointment Apply topically 5 times daily 30 g 1     amitriptyline (ELAVIL) 25 MG tablet Take 2 tablets (50 mg) by mouth At Bedtime 180 tablet 3     atorvastatin (LIPITOR) 10 MG tablet TAKE ONE TABLET BY MOUTH EVERY DAY 90 tablet 0     busPIRone (BUSPAR) 15 MG tablet TAKE 1 AND 1/2 TABLETS BY MOUTH TWO TIMES A DAY. 270 tablet 1     cholecalciferol, vitamin D3, 1,000 unit tablet [CHOLECALCIFEROL, VITAMIN D3, 1,000 UNIT TABLET] Take 2,000 Units by mouth daily.        estradiol-norethindrone (ACTIVELLA) 1-0.5 MG tablet Take 1 tablet by mouth daily 84 tablet 3     famotidine (FOR PEPCID) 10 MG tablet [FAMOTIDINE (FOR PEPCID) 10 MG TABLET] Take 20 mg by mouth daily. Takes two at night , and one in the morning        propranolol ER (INDERAL LA) 60 MG 24 hr capsule Take 1 capsule (60 mg) by mouth daily 90 capsule 3     VENTOLIN  (90 Base) MCG/ACT inhaler [VENTOLIN HFA 90 MCG/ACTUATION INHALER] INHALE 2 PUFFS BY MOUTH EVERY 6 HOURS AS NEEDED FOR WHEEZING OR SHORTNESS OF BREATH (Patient taking differently: as needed [VENTOLIN HFA 90  MCG/ACTUATION INHALER] INHALE 2 PUFFS BY MOUTH EVERY 6 HOURS AS NEEDED FOR WHEEZING OR SHORTNESS OF BREATH) 18 g 4     loratadine (CLARITIN) 10 mg tablet Take 10 mg by mouth as needed (Patient not taking: Reported on 10/24/2022)         Objective:   Wt Readings from Last 3 Encounters:   10/24/22 67.1 kg (148 lb)   09/28/22 65.2 kg (143 lb 11.2 oz)   07/13/22 64.4 kg (142 lb)     Vital signs:  BP (!) 162/80 (BP Location: Right arm, Patient Position: Sitting, Cuff Size: Adult Regular)   Pulse 78   Resp 16   Wt 67.1 kg (148 lb)   SpO2 96%   BMI 27.07 kg/m    Blood pressure recheck 132/88    Physical Exam:    General Appearance : Awake, Alert, No acute distress.  Anxious  HEENT: No Scleral icterus; the mucous membranes were pink and moist.  Conjunctivae not injected.  Slight periorbital puffiness.  Slight erythema on cheeks  Neck:  No cervical bruits, jugular venous distention, or thyromegaly   Chest: The spine was straight. Chest wall symmetric  Lungs: Respirations unlabored; the lungs are clear to auscultation.  No wheezing   Cardiovascular:   Normal point of maximal impulse.  Auscultation reveals regular first and second heart sounds with no murmurs, rubs, or gallops.  Carotid, radial, and dorsalis pedal pulses are intact and symmetric.  Abdomen: No organomegaly, masses, bruits, or tenderness. Bowels sounds are present  Extremities:  No clubbing, cyanosis.  No edema  Skin: No rash, bruising  Musculoskeletal: No tenderness.  Neurologic: Alert and oriented ×3. Speech is fluent.    ECG 7/13/2022: Sinus tachycardia 101 bpm.  Otherwise normal ECG    Lab Results:  LIPIDS:  Lab Results   Component Value Date    CHOL 219 (H) 09/28/2022    CHOL 160 05/09/2022    CHOL 270 (H) 10/28/2021     Lab Results   Component Value Date    HDL 71 09/28/2022    HDL 60 05/09/2022    HDL 79 10/28/2021     Lab Results   Component Value Date     (H) 09/28/2022    LDL 86 05/09/2022     (H) 10/28/2021     Lab Results    Component Value Date    TRIG 78 09/28/2022    TRIG 69 05/09/2022    TRIG 67 10/28/2021       24 Holter monitor 7/2022:  1. Normal Holter monitor recording. Patient demonstrates a tendency towards sinus tachycardia during the waking hours. Appropriate nocturnal heart rate slowing is observed. Sources for sinus tachycardia including dehydration, anemia, hypoxia, endocrine abnormality etc. should be considered. Patient may have postural tachycardia syndrome.  Average heart rate 107 bpm, peak 134 bpm  2. Patient had multiple symptomatic recordings all of which corresponded to sinus tachycardia.   3. Rare atrial ectopy and no sustained atrial tachyarrhythmia.   4. No ventricular ectopy   5. No profound bradycardia or pauses.      Echocardiogram 7/2022:  There is borderline concentric left ventricular hypertrophy.  The visual ejection fraction is 55-60%.  No regional wall motion abnormalities noted.  No significant valve disease.        MARGRET APPLE MD [facility]  [phone]    This note created using Dragon voice recognition software.  Sound alike errors may have escaped editing.    Thank you for allowing me to participate in the care of your patient.      Sincerely,     Margret Apple MD     [facility]  cc:   Margret Apple MD  [address] [facility] [address]  [place] [address]

## 2022-10-24 NOTE — PROGRESS NOTES
Cardiology Clinic Office Note    Assessment / Plan:    1.  Postural orthostatic Tachycardia syndrome.  Improved on propranolol 60 mg daily.  2.  Vague exercise-induced chest discomfort with decreased activity recently.  Encouraged to get back to a regular moderate exercise regimen.  We will check a graded exercise test to exclude a coronary cause of her symptoms and give her confidence to resume full exercise without limitations.  3.  Hypertension.  Marginally elevated.  Advised to keep closer record    Follow-up for abnormalities on stress testing.  Otherwise 1 year    ______________________________________________________________________    Subjective:    I had the opportunity to see Nicole Marcial at the [facility]. Nicole Marcial is a 77 year old female with a history of hypertension, degenerative joint disease, hyperlipidemia, CKD 3A.  She reports that since her teens she is tended to have frequent racing of her heart, though it has not kept her from being active.  She was seen in consultation 7/2022 for complaints about resting tachycardia and frequent rapid heartbeat, despite efforts to maintain adequate hydration with activities.  There is no history of atrial fibrillation.    Subsequent Holter monitoring showed sinus rhythm with an average heart rate of 107 bpm, maximum heart rate of 134 bpm, and numerous symptoms associated with sinus tachycardia.  Endings were consistent with postural orthostatic tachycardia syndrome.  Echocardiogram showed normal function without significant valvular abnormalities.  She was started on propranolol LA 60 mg daily and returns today.  She states that her systolic blood pressures are generally in the 130s at home.    She reports significant improvement in her racing heartbeat since starting the propranolol.  She still has occasional bouts but is able to carry on usual daily activities without difficulty.  She is not exercising as  she was over the summer, and plans to travel to [place] for the winter.  Her sleep seems to be less restorative as she feels the need to nap often during the daytime.  She has been told she snores but no apnea has been reported.  She reports her  has sleep apnea.  She has had occasional vague chest discomfort when walking fast with her  though this is not predictable and does not limit her activities.    She has tried taking CBD oil which seems to help with her back and knee and hip pain but she feels may have contributed to 1 prolonged episode of heart rate racing.  She has had intermittently a purpuric rash over her forearms, currently this has resolved.  She seldom uses her Ventolin inhaler.  Her alcohol intake is minimal and rare.  She has had no syncope or falls.  She has had no paroxysmal nocturnal dyspnea orthopnea or night sweats.    ______________________________________________________________________    Problem List:  Patient Active Problem List   Diagnosis     Hypertension     Lower Back Pain     Herpes Simplex Type I     Esophageal Reflux     Renal Cyst     Fibromyalgia     Taking Female Hormones For Postmenopausal HRT     Allergic Rhinitis     Lumbar radiculopathy     Intrinsic eczema     Diarrhea     Serum Enzyme Levels - Lipase Elevated     Osteoporosis Senile     Herniated Disc (L4 - L5) Right     Mixed hyperlipidemia     Selective deficiency of IgG (H)     Arthralgias In Multiple Sites     Temperature Intolerance To Cold   (Consistent)     Chronic Pain     Drug allergy     Guerrero's neuroma of left foot     Anxiety     Primary insomnia     Recurrent cold sores     Mild intermittent asthma without complication     Chronic kidney disease, stage 3 (H)     Sciatica     Polyp of duodenum     Polyp of colon     Nonspecific abdominal pain     History of colonic polyps     Gastro-esophageal reflux disease with esophagitis     Diverticular disease of large intestine     Contact dermatitis and  other eczema, due to unspecified cause     Bilateral plantar fasciitis     Benign neoplasm of transverse colon     Mixed hearing loss, bilateral     Medical History:  Past Medical History:   Diagnosis Date     Arthritis      Back pain      Chronic pain      Depressive disorder      Eczema      Fibromyalgia      Hypertension      Mild asthma      Nerve pain      Osteoporosis      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 [facility] [place]     RELEASE TRIGGER FINGER       SHOULDER SURGERY       SINUS SURGERY       TONSILLECTOMY       Social History:  Social History     Socioeconomic History     Marital status:      Spouse name: Not on file     Number of children: Not on file     Years of education: Not on file     Highest education level: Not on file   Occupational History     Not on file   Tobacco Use     Smoking status: Never     Smokeless tobacco: Never   Substance and Sexual Activity     Alcohol use: Yes     Alcohol/week: 6.0 standard drinks     Comment: Alcoholic Drinks/day: hard cider     Drug use: Not Currently     Types: Marijuana     Comment: Drug use: very rare     Sexual activity: Not Currently   Other Topics Concern     Not on file   Social History Narrative     Not on file     Social Determinants of Health     Financial Resource Strain: Not on file   Food Insecurity: Not on file   Transportation Needs: Not on file   Physical Activity: Not on file   Stress: Not on file   Social Connections: Not on file   Intimate Partner Violence: Not on file   Housing Stability: Not on file     Sleep History:  Snores, generally restorative but naps often in the daytime.  No apnea has been reported.  Exercise History:  Walks 2 miles a day with her , but not doing the exercise that she wants was.     Review of Systems:        Positive for weight gain, hearing loss, chest pain, irregular heartbeat, leg swelling and feet swelling, joint pain, muscle weakness, muscle pain, rash, dizziness, loss of balance, daytime sleepiness, depression, anxiety.  12 point review of systems otherwise negative     Please refer above for cardiac ROS details.         Family History:  Family History   Problem Relation Age of Onset     Osteoporosis Mother      Heart Disease Father      Hyperlipidemia Father      Anxiety Disorder Father      Substance Abuse Father      Sleep Apnea Sister      Snoring Sister      Heart Disease Sister      No Known Problems Daughter      No Known Problems Maternal Grandmother      No Known Problems Maternal Grandfather      No Known Problems Paternal Grandmother      No Known Problems Paternal Grandfather      No Known Problems Maternal Aunt      No Known Problems Paternal Aunt      Hypertension Brother      Depression Sister      Mental Illness Brother      Hereditary Breast and Ovarian Cancer Syndrome No family hx of      Breast Cancer No family hx of      Cancer No family hx of      Colon Cancer No family hx of      Endometrial Cancer No family hx of      Ovarian Cancer No family hx of          Allergies:  Allergies   Allergen Reactions     Adhesive [Mecrylate] Unknown     Contact allergy     Aspirin (Tartrazine Only) [Tartrazine] Unknown     Abdominal pain       Bee Venom Unknown     Cefuroxime Axetil [Cefuroxime] Unknown     Rash     Cephalosporins Unknown     Rash--ceftin     Chloral Hydrate Analogues [Chloral Hydrate] Unknown     Contact allergy     Chlorhexidine Gluconate [Chlorhexidine] Unknown     Contact allergy     Clindamycin Unknown     C diff     Codeine Unknown     Dairy [Lac Bovis] Unknown     Doxepin Unknown     hyper     Ergot Alkaloids [Ergoloid Mesylates] Unknown     Erythromycin Base [Erythromycin] Unknown     Stomach pain     Formaldehyde Hives     Gluten [Gluten Meal] Unknown     Celiac panel not positive,  patient states has abdominal pain when she eats gluten     Hydroxyzine Unknown     Latex Unknown     Morphine Unknown     Not effective     Nickel Unknown     Nortriptyline Unknown     hyper     Pregabalin Unknown     Pseudoephedrine Unknown     Sulfamethoxazole      Terconazole Unknown     Tramadol Unknown     Trazodone Nausea and Vomiting     Trimethoprim      Vancomycin Diarrhea     Venlafaxine Unknown     Vistaril [Hydroxyzine] Unknown     Menthyl Valerate Rash     Metronidazole Rash     Rash and neuropathy     Quaternium-15 Rash     Medications:  Current Outpatient Medications   Medication Sig Dispense Refill     acyclovir (ZOVIRAX) 5 % external ointment Apply topically 5 times daily 30 g 1     amitriptyline (ELAVIL) 25 MG tablet Take 2 tablets (50 mg) by mouth At Bedtime 180 tablet 3     atorvastatin (LIPITOR) 10 MG tablet TAKE ONE TABLET BY MOUTH EVERY DAY 90 tablet 0     busPIRone (BUSPAR) 15 MG tablet TAKE 1 AND 1/2 TABLETS BY MOUTH TWO TIMES A DAY. 270 tablet 1     cholecalciferol, vitamin D3, 1,000 unit tablet [CHOLECALCIFEROL, VITAMIN D3, 1,000 UNIT TABLET] Take 2,000 Units by mouth daily.        estradiol-norethindrone (ACTIVELLA) 1-0.5 MG tablet Take 1 tablet by mouth daily 84 tablet 3     famotidine (FOR PEPCID) 10 MG tablet [FAMOTIDINE (FOR PEPCID) 10 MG TABLET] Take 20 mg by mouth daily. Takes two at night , and one in the morning        propranolol ER (INDERAL LA) 60 MG 24 hr capsule Take 1 capsule (60 mg) by mouth daily 90 capsule 3     VENTOLIN  (90 Base) MCG/ACT inhaler [VENTOLIN HFA 90 MCG/ACTUATION INHALER] INHALE 2 PUFFS BY MOUTH EVERY 6 HOURS AS NEEDED FOR WHEEZING OR SHORTNESS OF BREATH (Patient taking differently: as needed [VENTOLIN HFA 90 MCG/ACTUATION INHALER] INHALE 2 PUFFS BY MOUTH EVERY 6 HOURS AS NEEDED FOR WHEEZING OR SHORTNESS OF BREATH) 18 g 4     loratadine (CLARITIN) 10 mg tablet Take 10 mg by mouth as needed (Patient not taking: Reported on 10/24/2022)          Objective:   Wt Readings from Last 3 Encounters:   10/24/22 67.1 kg (148 lb)   09/28/22 65.2 kg (143 lb 11.2 oz)   07/13/22 64.4 kg (142 lb)     Vital signs:  BP (!) 162/80 (BP Location: Right arm, Patient Position: Sitting, Cuff Size: Adult Regular)   Pulse 78   Resp 16   Wt 67.1 kg (148 lb)   SpO2 96%   BMI 27.07 kg/m    Blood pressure recheck 132/88    Physical Exam:    General Appearance : Awake, Alert, No acute distress.  Anxious  HEENT: No Scleral icterus; the mucous membranes were pink and moist.  Conjunctivae not injected.  Slight periorbital puffiness.  Slight erythema on cheeks  Neck:  No cervical bruits, jugular venous distention, or thyromegaly   Chest: The spine was straight. Chest wall symmetric  Lungs: Respirations unlabored; the lungs are clear to auscultation.  No wheezing   Cardiovascular:   Normal point of maximal impulse.  Auscultation reveals regular first and second heart sounds with no murmurs, rubs, or gallops.  Carotid, radial, and dorsalis pedal pulses are intact and symmetric.  Abdomen: No organomegaly, masses, bruits, or tenderness. Bowels sounds are present  Extremities:  No clubbing, cyanosis.  No edema  Skin: No rash, bruising  Musculoskeletal: No tenderness.  Neurologic: Alert and oriented ×3. Speech is fluent.    ECG 7/13/2022: Sinus tachycardia 101 bpm.  Otherwise normal ECG    Lab Results:  LIPIDS:  Lab Results   Component Value Date    CHOL 219 (H) 09/28/2022    CHOL 160 05/09/2022    CHOL 270 (H) 10/28/2021     Lab Results   Component Value Date    HDL 71 09/28/2022    HDL 60 05/09/2022    HDL 79 10/28/2021     Lab Results   Component Value Date     (H) 09/28/2022    LDL 86 05/09/2022     (H) 10/28/2021     Lab Results   Component Value Date    TRIG 78 09/28/2022    TRIG 69 05/09/2022    TRIG 67 10/28/2021       24 Holter monitor 7/2022:  1. Normal Holter monitor recording. Patient demonstrates a tendency towards sinus tachycardia during the waking hours.  Appropriate nocturnal heart rate slowing is observed. Sources for sinus tachycardia including dehydration, anemia, hypoxia, endocrine abnormality etc. should be considered. Patient may have postural tachycardia syndrome.  Average heart rate 107 bpm, peak 134 bpm  2. Patient had multiple symptomatic recordings all of which corresponded to sinus tachycardia.   3. Rare atrial ectopy and no sustained atrial tachyarrhythmia.   4. No ventricular ectopy   5. No profound bradycardia or pauses.      Echocardiogram 7/2022:  There is borderline concentric left ventricular hypertrophy.  The visual ejection fraction is 55-60%.  No regional wall motion abnormalities noted.  No significant valve disease.        MARGRET APPLE MD [facility]  [phone]    This note created using Dragon voice recognition software.  Sound alike errors may have escaped editing.

## 2022-10-26 ENCOUNTER — TRANSFERRED RECORDS (OUTPATIENT)
Dept: HEALTH INFORMATION MANAGEMENT | Facility: CLINIC | Age: 77
End: 2022-10-26

## 2022-11-02 ENCOUNTER — HOSPITAL ENCOUNTER (OUTPATIENT)
Dept: CARDIOLOGY | Facility: CLINIC | Age: 77
Discharge: HOME | End: 2022-11-02
Attending: INTERNAL MEDICINE | Admitting: INTERNAL MEDICINE
Payer: COMMERCIAL

## 2022-11-02 DIAGNOSIS — R07.89 ATYPICAL CHEST PAIN: ICD-10-CM

## 2022-11-02 DIAGNOSIS — G90.A POSTURAL ORTHOSTATIC TACHYCARDIA SYNDROME (POTS): ICD-10-CM

## 2022-11-02 DIAGNOSIS — R00.0 TACHYCARDIA: ICD-10-CM

## 2022-11-02 LAB
CV STRESS CURRENT BP HE: NORMAL
CV STRESS CURRENT HR HE: 100
CV STRESS CURRENT HR HE: 100
CV STRESS CURRENT HR HE: 101
CV STRESS CURRENT HR HE: 101
CV STRESS CURRENT HR HE: 102
CV STRESS CURRENT HR HE: 69
CV STRESS CURRENT HR HE: 70
CV STRESS CURRENT HR HE: 70
CV STRESS CURRENT HR HE: 72
CV STRESS CURRENT HR HE: 73
CV STRESS CURRENT HR HE: 74
CV STRESS CURRENT HR HE: 75
CV STRESS CURRENT HR HE: 76
CV STRESS CURRENT HR HE: 76
CV STRESS CURRENT HR HE: 77
CV STRESS CURRENT HR HE: 83
CV STRESS CURRENT HR HE: 84
CV STRESS CURRENT HR HE: 86
CV STRESS CURRENT HR HE: 88
CV STRESS CURRENT HR HE: 90
CV STRESS CURRENT HR HE: 93
CV STRESS CURRENT HR HE: 96
CV STRESS CURRENT HR HE: 97
CV STRESS CURRENT HR HE: 99
CV STRESS DEVIATION TIME HE: NORMAL
CV STRESS ECHO PERCENT HR HE: NORMAL
CV STRESS EXERCISE STAGE HE: NORMAL
CV STRESS EXERCISE STAGE REACHED HE: NORMAL
CV STRESS FINAL RESTING BP HE: NORMAL
CV STRESS FINAL RESTING HR HE: 74
CV STRESS MAX HR HE: 102
CV STRESS MAX TREADMILL GRADE HE: 14
CV STRESS MAX TREADMILL SPEED HE: 3.4
CV STRESS PEAK DIA BP HE: NORMAL
CV STRESS PEAK SYS BP HE: NORMAL
CV STRESS PHASE HE: NORMAL
CV STRESS PROTOCOL HE: NORMAL
CV STRESS REASON STOPPED HE: NORMAL
CV STRESS RESTING PT POSITION HE: NORMAL
CV STRESS RESTING PT POSITION HE: NORMAL
CV STRESS ST DEVIATION AMOUNT HE: NORMAL
CV STRESS ST DEVIATION ELEVATION HE: NORMAL
CV STRESS ST EVELATION AMOUNT HE: NORMAL
CV STRESS SYMPTOMS HE: NORMAL
CV STRESS TEST TYPE HE: NORMAL
CV STRESS TOTAL STAGE TIME MIN 1 HE: NORMAL
STRESS ECHO BASELINE DIASTOLIC HE: 91
STRESS ECHO BASELINE HR: 71
STRESS ECHO BASELINE SYSTOLIC BP: 164
STRESS ECHO LAST STRESS DIASTOLIC BP: 64
STRESS ECHO LAST STRESS HR: 101
STRESS ECHO LAST STRESS SYSTOLIC BP: 180
STRESS ECHO POST ESTIMATED WORKLOAD: 7.3
STRESS ECHO POST EXERCISE DUR MIN: 6
STRESS ECHO POST EXERCISE DUR SEC: 0
STRESS ECHO TARGET HR: 122

## 2022-11-02 PROCEDURE — 93016 CV STRESS TEST SUPVJ ONLY: CPT | Performed by: INTERNAL MEDICINE

## 2022-11-02 PROCEDURE — 93018 CV STRESS TEST I&R ONLY: CPT | Performed by: INTERNAL MEDICINE

## 2022-11-02 PROCEDURE — 93017 CV STRESS TEST TRACING ONLY: CPT

## 2023-01-09 DIAGNOSIS — E78.2 MIXED HYPERLIPIDEMIA: ICD-10-CM

## 2023-01-10 RX ORDER — ATORVASTATIN CALCIUM 10 MG/1
TABLET, FILM COATED ORAL
Qty: 90 TABLET | Refills: 2 | Status: SHIPPED | OUTPATIENT
Start: 2023-01-10 | End: 2023-07-21

## 2023-01-10 NOTE — TELEPHONE ENCOUNTER
"Last Written Prescription Date:  9/30/2022  Last Fill Quantity: 90,  # refills: 0   Last office visit provider:  9/28/2022     Requested Prescriptions   Pending Prescriptions Disp Refills     atorvastatin (LIPITOR) 10 MG tablet [Pharmacy Med Name: Atorvastatin Calcium 10 MG Oral Tablet] 90 tablet 0     Sig: Take 1 tablet by mouth once daily       Statins Protocol Passed - 1/9/2023  6:48 PM        Passed - LDL on file in past 12 months     Recent Labs   Lab Test 09/28/22  0954   *             Passed - No abnormal creatine kinase in past 12 months     Recent Labs   Lab Test 09/28/22  0954   CKT 68                Passed - Recent (12 mo) or future (30 days) visit within the authorizing provider's specialty     Patient has had an office visit with the authorizing provider or a provider within the authorizing providers department within the previous 12 mos or has a future within next 30 days. See \"Patient Info\" tab in inbasket, or \"Choose Columns\" in Meds & Orders section of the refill encounter.              Passed - Medication is active on med list        Passed - Patient is age 18 or older        Passed - No active pregnancy on record        Passed - No positive pregnancy test in past 12 months             Griselda Finn RN 01/10/23 11:28 AM  "

## 2023-01-22 ENCOUNTER — HEALTH MAINTENANCE LETTER (OUTPATIENT)
Age: 78
End: 2023-01-22

## 2023-05-24 ENCOUNTER — TRANSFERRED RECORDS (OUTPATIENT)
Dept: HEALTH INFORMATION MANAGEMENT | Facility: CLINIC | Age: 78
End: 2023-05-24

## 2023-05-30 DIAGNOSIS — R00.0 TACHYCARDIA: ICD-10-CM

## 2023-05-30 RX ORDER — PROPRANOLOL HCL 60 MG
120 CAPSULE, EXTENDED RELEASE 24HR ORAL DAILY
Qty: 180 CAPSULE | Refills: 3 | Status: SHIPPED | OUTPATIENT
Start: 2023-05-30 | End: 2023-08-04 | Stop reason: ALTCHOICE

## 2023-06-01 ENCOUNTER — TRANSFERRED RECORDS (OUTPATIENT)
Dept: HEALTH INFORMATION MANAGEMENT | Facility: CLINIC | Age: 78
End: 2023-06-01
Payer: COMMERCIAL

## 2023-06-07 ENCOUNTER — OFFICE VISIT (OUTPATIENT)
Dept: CARDIOLOGY | Facility: CLINIC | Age: 78
End: 2023-06-07
Payer: COMMERCIAL

## 2023-06-07 VITALS
BODY MASS INDEX: 26.89 KG/M2 | HEART RATE: 94 BPM | OXYGEN SATURATION: 98 % | WEIGHT: 147 LBS | RESPIRATION RATE: 16 BRPM | DIASTOLIC BLOOD PRESSURE: 95 MMHG | SYSTOLIC BLOOD PRESSURE: 170 MMHG

## 2023-06-07 DIAGNOSIS — I47.11 INAPPROPRIATE SINUS TACHYCARDIA (H): Primary | ICD-10-CM

## 2023-06-07 DIAGNOSIS — E78.5 HYPERLIPIDEMIA, UNSPECIFIED HYPERLIPIDEMIA TYPE: ICD-10-CM

## 2023-06-07 DIAGNOSIS — I10 ESSENTIAL HYPERTENSION: ICD-10-CM

## 2023-06-07 DIAGNOSIS — R07.9 CHEST PAIN, UNSPECIFIED TYPE: ICD-10-CM

## 2023-06-07 LAB
ATRIAL RATE - MUSE: 91 BPM
DIASTOLIC BLOOD PRESSURE - MUSE: NORMAL MMHG
INTERPRETATION ECG - MUSE: NORMAL
P AXIS - MUSE: 35 DEGREES
PR INTERVAL - MUSE: 184 MS
QRS DURATION - MUSE: 72 MS
QT - MUSE: 346 MS
QTC - MUSE: 425 MS
R AXIS - MUSE: 5 DEGREES
SYSTOLIC BLOOD PRESSURE - MUSE: NORMAL MMHG
T AXIS - MUSE: 18 DEGREES
VENTRICULAR RATE- MUSE: 91 BPM

## 2023-06-07 PROCEDURE — 99214 OFFICE O/P EST MOD 30 MIN: CPT | Performed by: GENERAL ACUTE CARE HOSPITAL

## 2023-06-07 PROCEDURE — 93000 ELECTROCARDIOGRAM COMPLETE: CPT | Performed by: INTERNAL MEDICINE

## 2023-06-07 RX ORDER — AMLODIPINE BESYLATE 5 MG/1
5 TABLET ORAL DAILY
Qty: 90 TABLET | Refills: 3 | Status: SHIPPED | OUTPATIENT
Start: 2023-06-07 | End: 2024-06-03

## 2023-06-07 NOTE — PATIENT INSTRUCTIONS
You can restart amlodipine.  We will also check your heart with a CT scan.  Follow-up as needed with Dr. Ellis.

## 2023-06-07 NOTE — PROGRESS NOTES
HEART CARE ENCOUNTER NOTE        Assessment/Recommendations   Assessment:    Chest pain. This is possibly cardiac. Stress electrocardiographic testing 11/2/2022 was non-diagnostic.  Inappropriate sinus tachycardia currently well-controlled on propranolol.  Essential hypertension. Elevated.  Hyperlipidemia. Last  mg/dL.  BMI 26.89.    Plan:  CT coronary angiogram.  Continue atorvastatin 10 mg daily, which will need to be increased if she is found to have coronary artery disease.  Resume prior prescription of amlodipine 5 mg daily.  Continue current dose of propranolol.  She has follow-up scheduled with Dr. Ellis on 7/10/2023.         History of Present Illness   Ms. Nicole Marcial is a 78 year old female with a significant past history of inappropriate sinus tachycardia and HTN presenting for urgent evaluation of chest pain. She normally follows in cardiology clinic with my colleague, Dr. Ramesh Ellis.    For the past few weeks, she has been experiencing periodic chest pain. It is a pressure sensation that occurs sporadically, not with exertion. However, she has also felt anthony fatigued during this time. Her BP has been higher and she recently had her propranolol dose increased to treat this although without much success. She still had an old prescription for amlodipine 5 mg tablets and took one of these which seemed to help her blood pressure.    She previously was having sinus tachycardia and palpitations, but this responded well to propranolol therapy. Stres testing last year showed no ischemia although she did not achieve the target heart rate. She had a normal sleep study in 2016.     No shortness of breath at rest or with exertion, light headedness/dizziness, pre-syncope, syncope, lower extremity swelling, palpitations, paroxysmal nocturnal dyspnea (PND), or orthopnea.     Cardiac Problems and Cardiac Diagnostics     Most Recent Cardiac testing:  ECG dated 6/7/2023 (personaly reviewed and  interpreted): SR, otherwise normal    Sleep study 6/8/2016 (report reviewed):    Sleep apnea was not seen in this study.    The severity of sleep-disordered breathing can be underestimated during portable test because the apnea/hypopnea index is calculated using total recording time rather than total sleep time. A full night in-lab polysomnography can be considered.    Nasal CPAP is not indicated.    Suggest optimizing sleep hygiene and avoiding any further sleep deprivation.    Recommend evaluation due to periodic leg movements.    Measures aimed at increasing sleep consolidation can be beneficial.    Holter monitor 7/21/2022 (report reviewed):  1. Normal Holter monitor recording. Patient demonstrates a tendency towards sinus tachycardia during the waking hours. Average heart rate 107 bpm. Appropriate nocturnal heart rate slowing is observed. Sources for sinus tachycardia including dehydration, anemia, hypoxia, endocrine abnormality etc. should be considered. Patient may have postural tachycardia syndrome.   2. Patient had multiple symptomatic recordings all of which corresponded to sinus tachycardia.   3. Rare atrial ectopy and no sustained atrial tachyarrhythmia.   4. No ventricular ectopy   5. No profound bradycardia or pauses.    ECHO 7/21/2022 (report reviewed):   There is borderline concentric left ventricular hypertrophy.  The visual ejection fraction is 55-60%.  No regional wall motion abnormalities noted.  No significant valve disease.    Stress test 11/2/2022 (report reviewed):   1. Objective: No electrocardiographic evidence of ischemia, but with reduced sensitivity due to not attaining target heart rate (patient achieved 71% of predicted maximal heart rate for age).  2. Subjective: Patient reported 1/10 chest discomfort with exercise.  3. Average functional capacity for age.       Medications  Allergies   Current Outpatient Medications   Medication Sig Dispense Refill     acyclovir (ZOVIRAX) 5 %  external ointment Apply topically 5 times daily 30 g 1     amitriptyline (ELAVIL) 25 MG tablet Take 2 tablets (50 mg) by mouth At Bedtime 180 tablet 3     atorvastatin (LIPITOR) 10 MG tablet Take 1 tablet by mouth once daily 90 tablet 2     busPIRone (BUSPAR) 15 MG tablet TAKE 1 AND 1/2 TABLETS BY MOUTH TWO TIMES A DAY. 270 tablet 1     cholecalciferol, vitamin D3, 1,000 unit tablet [CHOLECALCIFEROL, VITAMIN D3, 1,000 UNIT TABLET] Take 2,000 Units by mouth daily.        estradiol-norethindrone (ACTIVELLA) 1-0.5 MG tablet Take 1 tablet by mouth daily 84 tablet 3     famotidine (FOR PEPCID) 10 MG tablet [FAMOTIDINE (FOR PEPCID) 10 MG TABLET] Take 20 mg by mouth daily. Takes two at night , and one in the morning        loratadine (CLARITIN) 10 mg tablet Take 10 mg by mouth as needed (Patient not taking: Reported on 10/24/2022)       propranolol ER (INDERAL LA) 60 MG 24 hr capsule Take 2 capsules (120 mg) by mouth daily 180 capsule 3     VENTOLIN  (90 Base) MCG/ACT inhaler [VENTOLIN HFA 90 MCG/ACTUATION INHALER] INHALE 2 PUFFS BY MOUTH EVERY 6 HOURS AS NEEDED FOR WHEEZING OR SHORTNESS OF BREATH (Patient taking differently: as needed [VENTOLIN HFA 90 MCG/ACTUATION INHALER] INHALE 2 PUFFS BY MOUTH EVERY 6 HOURS AS NEEDED FOR WHEEZING OR SHORTNESS OF BREATH) 18 g 4      Allergies   Allergen Reactions     Adhesive [Cyanoacrylate] Unknown     Contact allergy     Aspirin (Tartrazine Only) [Tartrazine] Unknown     Abdominal pain       Bee Venom Unknown     Cefuroxime Axetil [Cefuroxime] Unknown     Rash     Cephalosporins Unknown     Rash--ceftin     Chloral Hydrate Analogues [Chloral Hydrate] Unknown     Contact allergy     Chlorhexidine Gluconate [Chlorhexidine] Unknown     Contact allergy     Clindamycin Unknown     C diff     Codeine Unknown     Dairy [Lac Bovis] Unknown     Doxepin Unknown     hyper     Ergot Alkaloids [Ergoloid Mesylates] Unknown     Erythromycin Base [Erythromycin] Unknown     Stomach pain      Formaldehyde Hives     Gluten [Gluten Meal] Unknown     Celiac panel not positive, patient states has abdominal pain when she eats gluten     Hydroxyzine Unknown     Latex Unknown     Morphine Unknown     Not effective     Nickel Unknown     Nortriptyline Unknown     hyper     Pregabalin Unknown     Pseudoephedrine Unknown     Sulfamethoxazole      Terconazole Unknown     Tramadol Unknown     Trazodone Nausea and Vomiting     Trimethoprim      Vancomycin Diarrhea     Venlafaxine Unknown     Vistaril [Hydroxyzine] Unknown     Menthyl Valerate Rash     Metronidazole Rash     Rash and neuropathy     Quaternium-15 Rash        Physical Examination Review of Systems   BP (!) 170/95 (BP Location: Left arm, Patient Position: Sitting, Cuff Size: Adult Regular)   Pulse 94   Resp 16   Wt 66.7 kg (147 lb)   SpO2 98%   BMI 26.89 kg/m    Body mass index is 26.89 kg/m .  Wt Readings from Last 3 Encounters:   06/07/23 66.7 kg (147 lb)   10/24/22 67.1 kg (148 lb)   09/28/22 65.2 kg (143 lb 11.2 oz)       General Appearance:   Pleasant  female, appears  stated age. no acute distress, normal body habitus   ENT/Mouth: membranes moist, no apparent gingival bleeding.      EYES:  no scleral icterus, normal conjunctivae   Neck: no carotid bruits. No anterior cervical lymphadenopaty   Respiratory:   lungs are clear to auscultation, no rales or wheezing, equal chest wall expansion    Cardiovascular:   Regular rhythm, normal rate. Normal first and second heart sounds with no murmurs, rubs, or gallops; the carotid, radial and posterior tibial pulses are intact, Jugular venous pressure normal, no edema bilaterally    Abdomen/GI:  no organomegaly, masses, bruits, or tenderness; bowel sounds are present   Extremities: no cyanosis or clubbing   Skin: no xanthelasma, warm.    Heme/lymph/ Immunology No apparent bleeding noted.   Neurologic: Alert and oriented. normal gait, no tremors     Psychiatric: Pleasant, calm, appropriate affect.    A  complete 10 system review of systems was performed and is negative except as mentioned in the HPI/subjective.         Past History   Past Medical History:   Past Medical History:   Diagnosis Date     Arthritis      Back pain      Chronic pain      Depressive disorder      Eczema      Fibromyalgia      Hypertension      Mild asthma      Nerve pain      Osteoporosis      Thyroid disease Hyperparathyroid gone from surgery       Past Surgical History:   Past Surgical History:   Procedure Laterality Date     BACK SURGERY      x3     BUNIONECTOMY       OTHER SURGICAL HISTORY N/A 08/16/2016    [place]roidectomy[place]. [place]     RELEASE TRIGGER FINGER       SHOULDER SURGERY       SINUS SURGERY       TONSILLECTOMY         Family History:   Family History   Problem Relation Age of Onset     Osteoporosis Mother      Heart Disease Father      Hyperlipidemia Father      Anxiety Disorder Father      Substance Abuse Father      Sleep Apnea Sister      Snoring Sister      Heart Disease Sister      No Known Problems Daughter      No Known Problems Maternal Grandmother      No Known Problems Maternal Grandfather      No Known Problems Paternal Grandmother      No Known Problems Paternal Grandfather      No Known Problems Maternal Aunt      No Known Problems Paternal Aunt      Hypertension Brother      Depression Sister      Mental Illness Brother      Hereditary Breast and Ovarian Cancer Syndrome No family hx of      Breast Cancer No family hx of      Cancer No family hx of      Colon Cancer No family hx of      Endometrial Cancer No family hx of      Ovarian Cancer No family hx of        Social History:   Social History     Socioeconomic History     Marital status:      Spouse name: Not on file     Number of children: Not on file     Years of education: Not on file     Highest education level: Not on file   Occupational History     Not on file   Tobacco Use     Smoking status: Never     Smokeless tobacco: Never   Vaping Use      Vaping status: Not on file   Substance and Sexual Activity     Alcohol use: Yes     Alcohol/week: 6.0 standard drinks of alcohol     Comment: Alcoholic Drinks/day: hard cider     Drug use: Not Currently     Types: Marijuana     Comment: Drug use: very rare     Sexual activity: Not Currently   Other Topics Concern     Not on file   Social History Narrative     Not on file     Social Determinants of Health     Financial Resource Strain: Not on file   Food Insecurity: Not on file   Transportation Needs: Not on file   Physical Activity: Not on file   Stress: Not on file   Social Connections: Not on file   Intimate Partner Violence: Not on file   Housing Stability: Not on file              Lab Results    Chemistry/lipid CBC Cardiac Enzymes/BNP/TSH/INR   Lab Results   Component Value Date    CHOL 219 (H) 09/28/2022    HDL 71 09/28/2022     (H) 09/28/2022    TRIG 78 09/28/2022    CR 1.04 (H) 09/28/2022    BUN 16.9 09/28/2022    POTASSIUM 4.4 09/28/2022     09/28/2022    CO2 25 09/28/2022      Lab Results   Component Value Date    WBC 10.2 09/28/2022    HGB 14.9 09/28/2022    HCT 45.0 09/28/2022    MCV 97 09/28/2022     09/28/2022      Lab Results   Component Value Date    TSH 2.55 10/08/2020          Dilan Espinoza MD [facility]  Non-Invasive Cardiologist  [facility]  Pager [phone]

## 2023-06-07 NOTE — LETTER
6/7/2023    Amirah Moran MD  [address]  [facility] [address]    RE: Nicole Marcial       Dear Colleague,     I had the pleasure of seeing Nicole Marcial in the [facility].  HEART CARE ENCOUNTER NOTE        Assessment/Recommendations   Assessment:    Chest pain. This is possibly cardiac. Stress electrocardiographic testing 11/2/2022 was non-diagnostic.  Inappropriate sinus tachycardia currently well-controlled on propranolol.  Essential hypertension. Elevated.  Hyperlipidemia. Last  mg/dL.  BMI 26.89.    Plan:  CT coronary angiogram.  Continue atorvastatin 10 mg daily, which will need to be increased if she is found to have coronary artery disease.  Resume prior prescription of amlodipine 5 mg daily.  Continue current dose of propranolol.  She has follow-up scheduled with Dr. Ellis on 7/10/2023.         History of Present Illness   Ms. Nicole Marcial is a 78 year old female with a significant past history of inappropriate sinus tachycardia and HTN presenting for urgent evaluation of chest pain. She normally follows in cardiology clinic with my colleague, Dr. Ramesh Ellis.    For the past few weeks, she has been experiencing periodic chest pain. It is a pressure sensation that occurs sporadically, not with exertion. However, she has also felt anthony fatigued during this time. Her BP has been higher and she recently had her propranolol dose increased to treat this although without much success. She still had an old prescription for amlodipine 5 mg tablets and took one of these which seemed to help her blood pressure.    She previously was having sinus tachycardia and palpitations, but this responded well to propranolol therapy. Stres testing last year showed no ischemia although she did not achieve the target heart rate. She had a normal sleep study in 2016.     No shortness of breath at rest or with exertion, light headedness/dizziness, pre-syncope, syncope, lower  extremity swelling, palpitations, paroxysmal nocturnal dyspnea (PND), or orthopnea.     Cardiac Problems and Cardiac Diagnostics     Most Recent Cardiac testing:  ECG dated 6/7/2023 (personaly reviewed and interpreted): SR, otherwise normal    Sleep study 6/8/2016 (report reviewed):   Sleep apnea was not seen in this study.   The severity of sleep-disordered breathing can be underestimated during portable test because the apnea/hypopnea index is calculated using total recording time rather than total sleep time. A full night in-lab polysomnography can be considered.   Nasal CPAP is not indicated.   Suggest optimizing sleep hygiene and avoiding any further sleep deprivation.   Recommend evaluation due to periodic leg movements.   Measures aimed at increasing sleep consolidation can be beneficial.    Holter monitor 7/21/2022 (report reviewed):  1. Normal Holter monitor recording. Patient demonstrates a tendency towards sinus tachycardia during the waking hours. Average heart rate 107 bpm. Appropriate nocturnal heart rate slowing is observed. Sources for sinus tachycardia including dehydration, anemia, hypoxia, endocrine abnormality etc. should be considered. Patient may have postural tachycardia syndrome.   2. Patient had multiple symptomatic recordings all of which corresponded to sinus tachycardia.   3. Rare atrial ectopy and no sustained atrial tachyarrhythmia.   4. No ventricular ectopy   5. No profound bradycardia or pauses.    ECHO 7/21/2022 (report reviewed):   There is borderline concentric left ventricular hypertrophy.  The visual ejection fraction is 55-60%.  No regional wall motion abnormalities noted.  No significant valve disease.    Stress test 11/2/2022 (report reviewed):   1. Objective: No electrocardiographic evidence of ischemia, but with reduced sensitivity due to not attaining target heart rate (patient achieved 71% of predicted maximal heart rate for age).  2. Subjective: Patient reported 1/10  chest discomfort with exercise.  3. Average functional capacity for age.       Medications  Allergies   Current Outpatient Medications   Medication Sig Dispense Refill    acyclovir (ZOVIRAX) 5 % external ointment Apply topically 5 times daily 30 g 1    amitriptyline (ELAVIL) 25 MG tablet Take 2 tablets (50 mg) by mouth At Bedtime 180 tablet 3    atorvastatin (LIPITOR) 10 MG tablet Take 1 tablet by mouth once daily 90 tablet 2    busPIRone (BUSPAR) 15 MG tablet TAKE 1 AND 1/2 TABLETS BY MOUTH TWO TIMES A DAY. 270 tablet 1    cholecalciferol, vitamin D3, 1,000 unit tablet [CHOLECALCIFEROL, VITAMIN D3, 1,000 UNIT TABLET] Take 2,000 Units by mouth daily.       estradiol-norethindrone (ACTIVELLA) 1-0.5 MG tablet Take 1 tablet by mouth daily 84 tablet 3    famotidine (FOR PEPCID) 10 MG tablet [FAMOTIDINE (FOR PEPCID) 10 MG TABLET] Take 20 mg by mouth daily. Takes two at night , and one in the morning       loratadine (CLARITIN) 10 mg tablet Take 10 mg by mouth as needed (Patient not taking: Reported on 10/24/2022)      propranolol ER (INDERAL LA) 60 MG 24 hr capsule Take 2 capsules (120 mg) by mouth daily 180 capsule 3    VENTOLIN  (90 Base) MCG/ACT inhaler [VENTOLIN HFA 90 MCG/ACTUATION INHALER] INHALE 2 PUFFS BY MOUTH EVERY 6 HOURS AS NEEDED FOR WHEEZING OR SHORTNESS OF BREATH (Patient taking differently: as needed [VENTOLIN HFA 90 MCG/ACTUATION INHALER] INHALE 2 PUFFS BY MOUTH EVERY 6 HOURS AS NEEDED FOR WHEEZING OR SHORTNESS OF BREATH) 18 g 4      Allergies   Allergen Reactions    Adhesive [Cyanoacrylate] Unknown     Contact allergy    Aspirin (Tartrazine Only) [Tartrazine] Unknown     Abdominal pain      Bee Venom Unknown    Cefuroxime Axetil [Cefuroxime] Unknown     Rash    Cephalosporins Unknown     Rash--ceftin    Chloral Hydrate Analogues [Chloral Hydrate] Unknown     Contact allergy    Chlorhexidine Gluconate [Chlorhexidine] Unknown     Contact allergy    Clindamycin Unknown     C diff    Codeine Unknown     Dairy [Lac Bovis] Unknown    Doxepin Unknown     hyper    Ergot Alkaloids [Ergoloid Mesylates] Unknown    Erythromycin Base [Erythromycin] Unknown     Stomach pain    Formaldehyde Hives    Gluten [Gluten Meal] Unknown     Celiac panel not positive, patient states has abdominal pain when she eats gluten    Hydroxyzine Unknown    Latex Unknown    Morphine Unknown     Not effective    Nickel Unknown    Nortriptyline Unknown     hyper    Pregabalin Unknown    Pseudoephedrine Unknown    Sulfamethoxazole     Terconazole Unknown    Tramadol Unknown    Trazodone Nausea and Vomiting    Trimethoprim     Vancomycin Diarrhea    Venlafaxine Unknown    Vistaril [Hydroxyzine] Unknown    Menthyl Valerate Rash    Metronidazole Rash     Rash and neuropathy    Quaternium-15 Rash        Physical Examination Review of Systems   BP (!) 170/95 (BP Location: Left arm, Patient Position: Sitting, Cuff Size: Adult Regular)   Pulse 94   Resp 16   Wt 66.7 kg (147 lb)   SpO2 98%   BMI 26.89 kg/m    Body mass index is 26.89 kg/m .  Wt Readings from Last 3 Encounters:   06/07/23 66.7 kg (147 lb)   10/24/22 67.1 kg (148 lb)   09/28/22 65.2 kg (143 lb 11.2 oz)       General Appearance:   Pleasant  female, appears  stated age. no acute distress, normal body habitus   ENT/Mouth: membranes moist, no apparent gingival bleeding.      EYES:  no scleral icterus, normal conjunctivae   Neck: no carotid bruits. No anterior cervical lymphadenopaty   Respiratory:   lungs are clear to auscultation, no rales or wheezing, equal chest wall expansion    Cardiovascular:   Regular rhythm, normal rate. Normal first and second heart sounds with no murmurs, rubs, or gallops; the carotid, radial and posterior tibial pulses are intact, Jugular venous pressure normal, no edema bilaterally    Abdomen/GI:  no organomegaly, masses, bruits, or tenderness; bowel sounds are present   Extremities: no cyanosis or clubbing   Skin: no xanthelasma, warm.    Heme/lymph/  Immunology No apparent bleeding noted.   Neurologic: Alert and oriented. normal gait, no tremors     Psychiatric: Pleasant, calm, appropriate affect.    A complete 10 system review of systems was performed and is negative except as mentioned in the HPI/subjective.         Past History   Past Medical History:   Past Medical History:   Diagnosis Date    Arthritis     Back pain     Chronic pain     Depressive disorder     Eczema     Fibromyalgia     Hypertension     Mild asthma     Nerve pain     Osteoporosis     Thyroid disease Hyperparathyroid gone from surgery       Past Surgical History:   Past Surgical History:   Procedure Laterality Date    BACK SURGERY      x3    BUNIONECTOMY      OTHER SURGICAL HISTORY N/A 08/16/2016    [place]    RELEASE TRIGGER FINGER      SHOULDER SURGERY      SINUS SURGERY      TONSILLECTOMY         Family History:   Family History   Problem Relation Age of Onset    Osteoporosis Mother     Heart Disease Father     Hyperlipidemia Father     Anxiety Disorder Father     Substance Abuse Father     Sleep Apnea Sister     Snoring Sister     Heart Disease Sister     No Known Problems Daughter     No Known Problems Maternal Grandmother     No Known Problems Maternal Grandfather     No Known Problems Paternal Grandmother     No Known Problems Paternal Grandfather     No Known Problems Maternal Aunt     No Known Problems Paternal Aunt     Hypertension Brother     Depression Sister     Mental Illness Brother     Hereditary Breast and Ovarian Cancer Syndrome No family hx of     Breast Cancer No family hx of     Cancer No family hx of     Colon Cancer No family hx of     Endometrial Cancer No family hx of     Ovarian Cancer No family hx of        Social History:   Social History     Socioeconomic History    Marital status:      Spouse name: Not on file    Number of children: Not on file    Years of education: Not on file    Highest education level: Not on file   Occupational  History    Not on file   Tobacco Use    Smoking status: Never    Smokeless tobacco: Never   Vaping Use    Vaping status: Not on file   Substance and Sexual Activity    Alcohol use: Yes     Alcohol/week: 6.0 standard drinks of alcohol     Comment: Alcoholic Drinks/day: hard cider    Drug use: Not Currently     Types: Marijuana     Comment: Drug use: very rare    Sexual activity: Not Currently   Other Topics Concern    Not on file   Social History Narrative    Not on file     Social Determinants of Health     Financial Resource Strain: Not on file   Food Insecurity: Not on file   Transportation Needs: Not on file   Physical Activity: Not on file   Stress: Not on file   Social Connections: Not on file   Intimate Partner Violence: Not on file   Housing Stability: Not on file              Lab Results    Chemistry/lipid CBC Cardiac Enzymes/BNP/TSH/INR   Lab Results   Component Value Date    CHOL 219 (H) 09/28/2022    HDL 71 09/28/2022     (H) 09/28/2022    TRIG 78 09/28/2022    CR 1.04 (H) 09/28/2022    BUN 16.9 09/28/2022    POTASSIUM 4.4 09/28/2022     09/28/2022    CO2 25 09/28/2022      Lab Results   Component Value Date    WBC 10.2 09/28/2022    HGB 14.9 09/28/2022    HCT 45.0 09/28/2022    MCV 97 09/28/2022     09/28/2022      Lab Results   Component Value Date    TSH 2.55 10/08/2020          Dilan Espinoza MD [facility]  Non-Invasive Cardiologist  [facility]  Pager [phone]        Thank you for allowing me to participate in the care of your patient.      Sincerely,     Dilan Espinoza MD     [facility]  cc:   Ramesh Ellis MD  [address]  [place] [address]

## 2023-06-16 ENCOUNTER — MYC MEDICAL ADVICE (OUTPATIENT)
Dept: FAMILY MEDICINE | Facility: CLINIC | Age: 78
End: 2023-06-16
Payer: COMMERCIAL

## 2023-06-16 DIAGNOSIS — Z79.890 CURRENT LONG-TERM USE OF POSTMENOPAUSAL HORMONE REPLACEMENT THERAPY: ICD-10-CM

## 2023-06-17 RX ORDER — ESTRADIOL AND NORETHINDRONE ACETATE 1; .5 MG/1; MG/1
1 TABLET ORAL DAILY
Qty: 84 TABLET | Refills: 3 | Status: SHIPPED | OUTPATIENT
Start: 2023-06-17 | End: 2024-05-02

## 2023-06-19 ENCOUNTER — HOSPITAL ENCOUNTER (OUTPATIENT)
Dept: CT IMAGING | Facility: CLINIC | Age: 78
Discharge: HOME | End: 2023-06-19
Attending: GENERAL ACUTE CARE HOSPITAL | Admitting: GENERAL ACUTE CARE HOSPITAL
Payer: COMMERCIAL

## 2023-06-19 VITALS — SYSTOLIC BLOOD PRESSURE: 122 MMHG | DIASTOLIC BLOOD PRESSURE: 66 MMHG

## 2023-06-19 DIAGNOSIS — R07.9 CHEST PAIN, UNSPECIFIED TYPE: ICD-10-CM

## 2023-06-19 LAB
BSA FOR ECHO PROCEDURE: 1.68 M2
CCTA ASCENDING AORTA: 2.7
CCTA SINUS: 2.8
CREAT BLD-MCNC: 1.1 MG/DL (ref 0.6–1.1)
GFR SERPL CREATININE-BSD FRML MDRD: 51 ML/MIN/1.73M2

## 2023-06-19 PROCEDURE — 82565 ASSAY OF CREATININE: CPT

## 2023-06-19 PROCEDURE — 250N000009 HC RX 250: Performed by: GENERAL ACUTE CARE HOSPITAL

## 2023-06-19 PROCEDURE — 250N000013 HC RX MED GY IP 250 OP 250 PS 637: Performed by: GENERAL ACUTE CARE HOSPITAL

## 2023-06-19 PROCEDURE — 75574 CT ANGIO HRT W/3D IMAGE: CPT | Mod: 26 | Performed by: GENERAL ACUTE CARE HOSPITAL

## 2023-06-19 PROCEDURE — 250N000011 HC RX IP 250 OP 636: Performed by: GENERAL ACUTE CARE HOSPITAL

## 2023-06-19 PROCEDURE — 75574 CT ANGIO HRT W/3D IMAGE: CPT

## 2023-06-19 RX ORDER — METOPROLOL TARTRATE 1 MG/ML
5 INJECTION, SOLUTION INTRAVENOUS
Status: DISCONTINUED | OUTPATIENT
Start: 2023-06-19 | End: 2023-06-20 | Stop reason: HOSPADM

## 2023-06-19 RX ORDER — DILTIAZEM HYDROCHLORIDE 5 MG/ML
10 INJECTION INTRAVENOUS
Status: DISCONTINUED | OUTPATIENT
Start: 2023-06-19 | End: 2023-06-20 | Stop reason: HOSPADM

## 2023-06-19 RX ORDER — DILTIAZEM HYDROCHLORIDE 5 MG/ML
5 INJECTION INTRAVENOUS
Status: DISCONTINUED | OUTPATIENT
Start: 2023-06-19 | End: 2023-06-20 | Stop reason: HOSPADM

## 2023-06-19 RX ORDER — NITROGLYCERIN 0.4 MG/1
0.4 TABLET SUBLINGUAL ONCE
Status: COMPLETED | OUTPATIENT
Start: 2023-06-19 | End: 2023-06-19

## 2023-06-19 RX ORDER — LIDOCAINE 40 MG/G
CREAM TOPICAL
Status: DISCONTINUED | OUTPATIENT
Start: 2023-06-19 | End: 2023-06-20 | Stop reason: HOSPADM

## 2023-06-19 RX ORDER — IOPAMIDOL 755 MG/ML
100 INJECTION, SOLUTION INTRAVASCULAR ONCE
Status: COMPLETED | OUTPATIENT
Start: 2023-06-19 | End: 2023-06-19

## 2023-06-19 RX ADMIN — NITROGLYCERIN 0.4 MG: 0.4 TABLET SUBLINGUAL at 10:16

## 2023-06-19 RX ADMIN — METOPROLOL TARTRATE 5 MG: 1 INJECTION, SOLUTION INTRAVENOUS at 10:19

## 2023-06-19 RX ADMIN — IOPAMIDOL 90 ML: 755 INJECTION, SOLUTION INTRAVENOUS at 10:28

## 2023-06-19 RX ADMIN — METOPROLOL TARTRATE 5 MG: 1 INJECTION, SOLUTION INTRAVENOUS at 10:13

## 2023-06-20 DIAGNOSIS — I47.11 INAPPROPRIATE SINUS TACHYCARDIA (H): Primary | ICD-10-CM

## 2023-06-22 ENCOUNTER — TELEPHONE (OUTPATIENT)
Dept: FAMILY MEDICINE | Facility: CLINIC | Age: 78
End: 2023-06-22

## 2023-06-22 NOTE — TELEPHONE ENCOUNTER
PA Initiation    Medication:  estradiol-norethindrone (ACTIVELLA) 1-0.5 MG tablet  Insurance Company:  [employer]  Pharmacy Filling the Rx:  [facility]  Filling Pharmacy Phone:  [phone]  Filling Pharmacy Fax:  [phone]  Start Date:  06/22/2023

## 2023-06-27 NOTE — TELEPHONE ENCOUNTER
Prior Authorization Approval    Medication: ESTRADIOL-NORETHINDRONE ACET 1-0.5 MG PO TABS  Authorization Effective Date: 6/27/2023  Authorization Expiration Date: 12/31/2023  Approved Dose/Quantity:   Reference #:     Insurance Company: [employer] ([facility]) - Phone [phone] Fax [phone]  Expected CoPay:       CoPay Card Available:      Financial Assistance Needed:   Which Pharmacy is filling the prescription: [facility] [place] [address]  Pharmacy Notified: Yes  Patient Notified:  **Instructed pharmacy to notify patient when script is ready to /ship.**

## 2023-06-27 NOTE — TELEPHONE ENCOUNTER
Central Prior Authorization Team  Phone: [phone]    PA Initiation    Medication: ESTRADIOL-NORETHINDRONE ACET 1-0.5 MG PO TABS  Insurance Company: [employer] ([facility]) - Phone [phone] Fax [phone]  Pharmacy Filling the Rx: [place] [address] [place] [address] - [address] [place] [address]  Filling Pharmacy Phone: [phone]  Filling Pharmacy Fax:    Start Date: 6/27/2023

## 2023-07-17 ENCOUNTER — MYC MEDICAL ADVICE (OUTPATIENT)
Dept: CARDIOLOGY | Facility: CLINIC | Age: 78
End: 2023-07-17
Payer: COMMERCIAL

## 2023-07-17 DIAGNOSIS — G90.A POSTURAL ORTHOSTATIC TACHYCARDIA SYNDROME (POTS): ICD-10-CM

## 2023-07-17 DIAGNOSIS — E78.5 HYPERLIPIDEMIA, UNSPECIFIED HYPERLIPIDEMIA TYPE: ICD-10-CM

## 2023-07-17 DIAGNOSIS — I10 HYPERTENSION WITH INTOLERANCE TO MULTIPLE ANTIHYPERTENSIVE DRUGS: ICD-10-CM

## 2023-07-17 DIAGNOSIS — R00.0 TACHYCARDIA: ICD-10-CM

## 2023-07-17 DIAGNOSIS — I10 ESSENTIAL HYPERTENSION: ICD-10-CM

## 2023-07-17 DIAGNOSIS — I47.11 INAPPROPRIATE SINUS TACHYCARDIA (H): Primary | ICD-10-CM

## 2023-07-17 DIAGNOSIS — T88.7XXA MEDICATION SIDE EFFECTS: ICD-10-CM

## 2023-07-17 DIAGNOSIS — R07.9 CHEST PAIN, UNSPECIFIED TYPE: ICD-10-CM

## 2023-07-18 RX ORDER — IVABRADINE 5 MG/1
5 TABLET, FILM COATED ORAL 2 TIMES DAILY WITH MEALS
Qty: 180 TABLET | Refills: 3 | Status: SHIPPED | OUTPATIENT
Start: 2023-07-18 | End: 2023-08-03

## 2023-07-18 NOTE — TELEPHONE ENCOUNTER
[employer] message sent.    Dilan Espinoza MD Fleischhacker, Kelly, RN  Phone Number: [phone]     Could have her try extended-release diltiazem 120 mg once daily then.     Ivabradine is another option which specifically works to just slow the heart and typically has no other side effects, although it is brand name and could be expensive.

## 2023-07-18 NOTE — TELEPHONE ENCOUNTER
[employer]" message sent.    Dilan Espinoza MD  You 5 minutes ago (9:44 AM)     BW  If not tolerating propranolol, could try metoprolol tartrate 25 mg as needed.

## 2023-07-18 NOTE — TELEPHONE ENCOUNTER
[employer] message sent.  ------------------------------------  Dilan Espinoza MD  You 33 minutes ago (1:43 PM)     BW  Then let's try ivabradine 5 mg twice daily.      You  Dilan Espinoza MD 36 minutes ago (1:40 PM)     KF  Please see message below.   Recommendations?   Thank you,   Pamella

## 2023-07-19 ENCOUNTER — TELEPHONE (OUTPATIENT)
Dept: CARDIOLOGY | Facility: CLINIC | Age: 78
End: 2023-07-19
Payer: COMMERCIAL

## 2023-07-19 NOTE — TELEPHONE ENCOUNTER
PA Initiation    Key- [id number]    Medication:  Corlanor  Insurance Company:    Pharmacy Filling the Rx:  [phone]  Filling Pharmacy Phone:  [phone]  Filling Pharmacy Fax:    Start Date:

## 2023-07-21 ENCOUNTER — TELEPHONE (OUTPATIENT)
Dept: FAMILY MEDICINE | Facility: CLINIC | Age: 78
End: 2023-07-21

## 2023-07-21 ENCOUNTER — OFFICE VISIT (OUTPATIENT)
Dept: FAMILY MEDICINE | Facility: CLINIC | Age: 78
End: 2023-07-21
Payer: COMMERCIAL

## 2023-07-21 VITALS
RESPIRATION RATE: 16 BRPM | TEMPERATURE: 98.4 F | BODY MASS INDEX: 26.92 KG/M2 | WEIGHT: 146.3 LBS | SYSTOLIC BLOOD PRESSURE: 120 MMHG | OXYGEN SATURATION: 96 % | HEART RATE: 102 BPM | HEIGHT: 62 IN | DIASTOLIC BLOOD PRESSURE: 70 MMHG

## 2023-07-21 DIAGNOSIS — R31.21 ASYMPTOMATIC MICROSCOPIC HEMATURIA: ICD-10-CM

## 2023-07-21 DIAGNOSIS — D80.4 IGM DEFICIENCY (H): ICD-10-CM

## 2023-07-21 DIAGNOSIS — I10 ESSENTIAL HYPERTENSION: ICD-10-CM

## 2023-07-21 DIAGNOSIS — R00.0 TACHYCARDIA: ICD-10-CM

## 2023-07-21 DIAGNOSIS — E78.2 MIXED HYPERLIPIDEMIA: Primary | ICD-10-CM

## 2023-07-21 DIAGNOSIS — K21.9 GASTROESOPHAGEAL REFLUX DISEASE WITHOUT ESOPHAGITIS: ICD-10-CM

## 2023-07-21 DIAGNOSIS — B00.9 HERPES SIMPLEX VIRUS (HSV) INFECTION: ICD-10-CM

## 2023-07-21 DIAGNOSIS — N18.30 STAGE 3 CHRONIC KIDNEY DISEASE, UNSPECIFIED WHETHER STAGE 3A OR 3B CKD (H): ICD-10-CM

## 2023-07-21 DIAGNOSIS — F51.01 PRIMARY INSOMNIA: ICD-10-CM

## 2023-07-21 DIAGNOSIS — J45.20 MILD INTERMITTENT ASTHMA WITHOUT COMPLICATION: ICD-10-CM

## 2023-07-21 DIAGNOSIS — D80.3 SELECTIVE DEFICIENCY OF IGG (H): ICD-10-CM

## 2023-07-21 LAB
ALBUMIN SERPL BCG-MCNC: 5 G/DL (ref 3.5–5.2)
ALBUMIN UR-MCNC: NEGATIVE MG/DL
ALP SERPL-CCNC: 54 U/L (ref 35–104)
ALT SERPL W P-5'-P-CCNC: 16 U/L (ref 0–50)
ANION GAP SERPL CALCULATED.3IONS-SCNC: 13 MMOL/L (ref 7–15)
APPEARANCE UR: CLEAR
AST SERPL W P-5'-P-CCNC: 24 U/L (ref 0–45)
BACTERIA #/AREA URNS HPF: ABNORMAL /HPF
BILIRUB SERPL-MCNC: 0.4 MG/DL
BILIRUB UR QL STRIP: NEGATIVE
BUN SERPL-MCNC: 11.3 MG/DL (ref 8–23)
CALCIUM SERPL-MCNC: 9.2 MG/DL (ref 8.8–10.2)
CHLORIDE SERPL-SCNC: 106 MMOL/L (ref 98–107)
CHOLEST SERPL-MCNC: 183 MG/DL
COLOR UR AUTO: YELLOW
CREAT SERPL-MCNC: 0.96 MG/DL (ref 0.51–0.95)
CREAT UR-MCNC: 59.7 MG/DL
DEPRECATED HCO3 PLAS-SCNC: 22 MMOL/L (ref 22–29)
ERYTHROCYTE [DISTWIDTH] IN BLOOD BY AUTOMATED COUNT: 14.1 % (ref 10–15)
GFR SERPL CREATININE-BSD FRML MDRD: 61 ML/MIN/1.73M2
GLUCOSE SERPL-MCNC: 103 MG/DL (ref 70–99)
GLUCOSE UR STRIP-MCNC: NEGATIVE MG/DL
HCT VFR BLD AUTO: 41.8 % (ref 35–47)
HDLC SERPL-MCNC: 57 MG/DL
HGB BLD-MCNC: 14 G/DL (ref 11.7–15.7)
HGB UR QL STRIP: ABNORMAL
KETONES UR STRIP-MCNC: NEGATIVE MG/DL
LDLC SERPL CALC-MCNC: 115 MG/DL
LEUKOCYTE ESTERASE UR QL STRIP: ABNORMAL
MCH RBC QN AUTO: 32.3 PG (ref 26.5–33)
MCHC RBC AUTO-ENTMCNC: 33.5 G/DL (ref 31.5–36.5)
MCV RBC AUTO: 97 FL (ref 78–100)
MICROALBUMIN UR-MCNC: <12 MG/L
MICROALBUMIN/CREAT UR: NORMAL MG/G{CREAT}
NITRATE UR QL: NEGATIVE
NONHDLC SERPL-MCNC: 126 MG/DL
PH UR STRIP: 6 [PH] (ref 5–8)
PLATELET # BLD AUTO: 253 10E3/UL (ref 150–450)
POTASSIUM SERPL-SCNC: 4.3 MMOL/L (ref 3.4–5.3)
PROT SERPL-MCNC: 7.7 G/DL (ref 6.4–8.3)
RBC # BLD AUTO: 4.33 10E6/UL (ref 3.8–5.2)
RBC #/AREA URNS AUTO: ABNORMAL /HPF
SODIUM SERPL-SCNC: 141 MMOL/L (ref 136–145)
SP GR UR STRIP: 1.01 (ref 1–1.03)
SQUAMOUS #/AREA URNS AUTO: ABNORMAL /LPF
TRIGL SERPL-MCNC: 55 MG/DL
TSH SERPL DL<=0.005 MIU/L-ACNC: 1.57 UIU/ML (ref 0.3–4.2)
UROBILINOGEN UR STRIP-ACNC: 0.2 E.U./DL
WBC # BLD AUTO: 9.1 10E3/UL (ref 4–11)
WBC #/AREA URNS AUTO: ABNORMAL /HPF

## 2023-07-21 PROCEDURE — 82306 VITAMIN D 25 HYDROXY: CPT | Performed by: FAMILY MEDICINE

## 2023-07-21 PROCEDURE — 87086 URINE CULTURE/COLONY COUNT: CPT | Performed by: FAMILY MEDICINE

## 2023-07-21 PROCEDURE — 82043 UR ALBUMIN QUANTITATIVE: CPT | Performed by: FAMILY MEDICINE

## 2023-07-21 PROCEDURE — 81001 URINALYSIS AUTO W/SCOPE: CPT | Performed by: FAMILY MEDICINE

## 2023-07-21 PROCEDURE — 36415 COLL VENOUS BLD VENIPUNCTURE: CPT | Performed by: FAMILY MEDICINE

## 2023-07-21 PROCEDURE — 84443 ASSAY THYROID STIM HORMONE: CPT | Performed by: FAMILY MEDICINE

## 2023-07-21 PROCEDURE — 99214 OFFICE O/P EST MOD 30 MIN: CPT | Performed by: FAMILY MEDICINE

## 2023-07-21 PROCEDURE — 80053 COMPREHEN METABOLIC PANEL: CPT | Performed by: FAMILY MEDICINE

## 2023-07-21 PROCEDURE — 82784 ASSAY IGA/IGD/IGG/IGM EACH: CPT | Performed by: FAMILY MEDICINE

## 2023-07-21 PROCEDURE — 82787 IGG 1 2 3 OR 4 EACH: CPT | Performed by: FAMILY MEDICINE

## 2023-07-21 PROCEDURE — 80061 LIPID PANEL: CPT | Performed by: FAMILY MEDICINE

## 2023-07-21 PROCEDURE — 85027 COMPLETE CBC AUTOMATED: CPT | Performed by: FAMILY MEDICINE

## 2023-07-21 PROCEDURE — 82570 ASSAY OF URINE CREATININE: CPT | Performed by: FAMILY MEDICINE

## 2023-07-21 RX ORDER — ALBUTEROL SULFATE 90 UG/1
2 AEROSOL, METERED RESPIRATORY (INHALATION) EVERY 6 HOURS PRN
Qty: 18 G | Refills: 3 | Status: SHIPPED | OUTPATIENT
Start: 2023-07-21 | End: 2024-05-08

## 2023-07-21 RX ORDER — ATORVASTATIN CALCIUM 10 MG/1
10 TABLET, FILM COATED ORAL DAILY
Qty: 90 TABLET | Refills: 3 | Status: SHIPPED | OUTPATIENT
Start: 2023-07-21 | End: 2024-05-08

## 2023-07-21 RX ORDER — FAMOTIDINE 10 MG
TABLET ORAL
Start: 2023-07-21 | End: 2024-01-25

## 2023-07-21 RX ORDER — ACYCLOVIR 50 MG/G
OINTMENT TOPICAL
Qty: 30 G | Refills: 3 | Status: SHIPPED | OUTPATIENT
Start: 2023-07-21

## 2023-07-21 ASSESSMENT — PAIN SCALES - GENERAL: PAINLEVEL: MILD PAIN (2)

## 2023-07-21 ASSESSMENT — ASTHMA QUESTIONNAIRES: ACT_TOTALSCORE: 19

## 2023-07-21 NOTE — PROGRESS NOTES
"  Assessment & Plan     Mixed hyperlipidemia    - atorvastatin (LIPITOR) 10 MG tablet  Dispense: 90 tablet; Refill: 3  - Lipid panel reflex to direct LDL Fasting  - TSH  - Lipid panel reflex to direct LDL Fasting  - TSH    Herpes simplex virus (HSV) infection    - acyclovir (ZOVIRAX) 5 % external ointment  Dispense: 30 g; Refill: 3    Stage 3 chronic kidney disease, unspecified whether stage 3a or 3b CKD (H)    - Albumin Random Urine Quantitative with Creat Ratio  - CBC with platelets  - UA Macroscopic with reflex to Microscopic and Culture - Clinic Collect  - Comprehensive metabolic panel (BMP + Alb, Alk Phos, ALT, AST, Total. Bili, TP)  - Vitamin D Deficiency  - TSH  - Albumin Random Urine Quantitative with Creat Ratio  - CBC with platelets  - Comprehensive metabolic panel (BMP + Alb, Alk Phos, ALT, AST, Total. Bili, TP)  - Vitamin D Deficiency  - TSH  - Urine Microscopic Exam  - Urine Culture    Primary insomnia    - amitriptyline (ELAVIL) 25 MG tablet  Dispense: 180 tablet; Refill: 3    Gastroesophageal reflux disease without esophagitis    - famotidine (PEPCID) 10 MG tablet    Mild intermittent asthma without complication    - albuterol (PROAIR HFA/PROVENTIL HFA/VENTOLIN HFA) 108 (90 Base) MCG/ACT inhaler  Dispense: 18 g; Refill: 3    Essential hypertension  Continue amlodipine 5 mg daily and propranolol 60 mg daily  - Lipid panel reflex to direct LDL Fasting  - TSH  - Lipid panel reflex to direct LDL Fasting  - TSH    Selective deficiency of IgG (H)    - IgG subclasses  - IgG subclasses    IgM deficiency (H)    - IgM  - IgM    Asymptomatic microscopic hematuria  Continue to monitor UA     Tachycardia  Followed by cardiology.  Meds are being adjusted           BMI:   Estimated body mass index is 26.76 kg/m  as calculated from the following:    Height as of this encounter: 1.575 m (5' 2\").    Weight as of this encounter: 66.4 kg (146 lb 4.8 oz).           Amirah Moran MD  [facility] " LAURA Rivera is a 78 year old, presenting for the following health issues:  Recheck Medication and Follow Up        7/21/2023     9:35 AM   Additional Questions   Roomed by Rebekah Johnson   Accompanied by Self     History of Present Illness     Asthma:  She presents for follow up of asthma.  She has no cough, no wheezing, and no shortness of breath. She is using a relief medication a few times a week. She does not miss any doses of her controller medication throughout the week.Patient is aware of the following triggers: humidity, mold, pollens, smoke and strong odors and fumes. The patient has not had a visit to the Emergency Room, Urgent Care or Hospital due to asthma since the last clinic visit.     Back Pain:  She presents for follow up of back pain. Patient's back pain is a chronic problem.  Location of back pain:  Right lower back  Description of back pain: burning, dull ache, gnawing and shooting  Back pain spreads: right buttocks and right thigh    Since patient first noticed back pain, pain is: gradually worsening  Does back pain interfere with her job:  Not applicable      CKD: She uses over the counter pain medication, including tylenol, a few times a week.    Hyperlipidemia:  She presents for follow up of hyperlipidemia.  She is taking medication to lower cholesterol. She is not having myalgia or other side effects to statin medications.    Hypertension: She presents for follow up of hypertension.  She does check blood pressure  regularly outside of the clinic. Outside blood pressures have been over 140/90. She does not follow a low salt diet.     Vascular Disease:  She presents for follow up of vascular disease.  She never takes nitroglycerin. She is not taking daily aspirin.    She eats 4 or more servings of fruits and vegetables daily.She exercises with enough effort to increase her heart rate 20 to 29 minutes per day.  She exercises with enough effort to increase her heart rate 6  "days per week.   She is taking medications regularly.       She comes in today to follow-up on several concerns.    She is followed by cardiology for sinus tachycardia.  Was started on propranolol last year.  Discontinued the medication over the winter due to side effects but then recently restarted it due to elevated heart rate.  Was recently prescribed a new medication, ivabradine but is waiting for prior authorization from her insurance before starting it.  She continues on amlodipine in addition to propranolol.  Blood pressures have been well controlled.  Recently had a CT angiogram of the chest.  This showed minimal nonobstructive atherosclerotic coronary artery disease     She has history of insomnia.  She is currently on amitriptyline 25 mg at bedtime.  Previously took 50 mg but dose was reduced due to dry mouth.       She is on hormone replacement.  Had tried stopping it last year but experienced increased hot flashes.  She is back on the medication and doing well.    Continues famotidine for GERD.    She has noticed some increased shortness of breath with the poor air quality.  Would like a new inhaler.    She has colonoscopy scheduled for next month.    She would like labs today.  She has history of hematuria.  Has been evaluated by urology for this.  No cause determined.  Urologist recommended that she continue to have her urine checked by her PCP.  She has underlying stage III chronic kidney disease.    She spends her hdz in [place].    .  No other concerns or questions.            Review of Systems         Objective    /70   Pulse 102   Temp 98.4  F (36.9  C) (Temporal)   Resp 16   Ht 1.575 m (5' 2\")   Wt 66.4 kg (146 lb 4.8 oz)   SpO2 96%   BMI 26.76 kg/m    Body mass index is 26.76 kg/m .  Physical Exam   GENERAL: healthy, alert and no distress  RESP: lungs clear to auscultation - no rales, rhonchi or wheezes  CV: regular rate and rhythm, normal S1 S2, no S3 or S4, no murmur, click or " rub, no peripheral edema and peripheral pulses strong  PSYCH: mentation appears normal, affect normal/bright

## 2023-07-21 NOTE — LETTER
My Asthma Action Plan    Name: Nicole Marcial   YOB: 1945  Date: 7/21/2023   My doctor: Amirah Moran MD   My clinic: [facility]        My Rescue Medicine:   Albuterol inhaler (Proair/Ventolin/Proventil HFA)  2-4 puffs EVERY 4 HOURS as needed. Use a spacer if recommended by your provider.   My Asthma Severity:   Intermittent / Exercise Induced  Know your asthma triggers:                GREEN ZONE   Good Control  I feel good  No cough or wheeze  Can work, sleep and play without asthma symptoms       Take your asthma control medicine every day.     If exercise triggers your asthma, take your rescue medication  15 minutes before exercise or sports, and  During exercise if you have asthma symptoms  Spacer to use with inhaler: If you have a spacer, make sure to use it with your inhaler             YELLOW ZONE Getting Worse  I have ANY of these:  I do not feel good  Cough or wheeze  Chest feels tight  Wake up at night   Keep taking your Green Zone medications  Start taking your rescue medicine:  every 20 minutes for up to 1 hour. Then every 4 hours for 24-48 hours.  If you stay in the Yellow Zone for more than 12-24 hours, contact your doctor.  If you do not return to the Green Zone in 12-24 hours or you get worse, start taking your oral steroid medicine if prescribed by your provider.           RED ZONE Medical Alert - Get Help  I have ANY of these:  I feel awful  Medicine is not helping  Breathing getting harder  Trouble walking or talking  Nose opens wide to breathe       Take your rescue medicine NOW  If your provider has prescribed an oral steroid medicine, start taking it NOW  Call your doctor NOW  If you are still in the Red Zone after 20 minutes and you have not reached your doctor:  Take your rescue medicine again and  Call 911 or go to the emergency room right away    See your regular doctor within 2 weeks of an Emergency Room or Urgent Care visit for follow-up  treatment.          Annual Reminders:  Meet with Asthma Educator,  Flu Shot in the Fall, consider Pneumonia Vaccination for patients with asthma (aged 19 and older).    Pharmacy:    [facility], [place] [address] [place] [address] [address] [place] [address] PHARMACY 5657 - MARIAH (FLOIRDALMA), HF - [address] [facility]    Electronically signed by Amirah Moran MD   Date: 07/21/23                    Asthma Triggers  How To Control Things That Make Your Asthma Worse    Triggers are things that make your asthma worse.  Look at the list below to help you find your triggers and   what you can do about them. You can help prevent asthma flare-ups by staying away from your triggers.      Trigger                                                          What you can do   Cigarette Smoke  Tobacco smoke can make asthma worse. Do not allow smoking in your home, car or around you.  Be sure no one smokes at a child s day care or school.  If you smoke, ask your health care provider for ways to help you quit.  Ask family members to quit too.  Ask your health care provider for a referral to Quit Plan to help you quit smoking, or call 1-904-304-PLAN.     Colds, Flu, Bronchitis  These are common triggers of asthma. Wash your hands often.  Don t touch your eyes, nose or mouth.  Get a flu shot every year.     Dust Mites  These are tiny bugs that live in cloth or carpet. They are too small to see. Wash sheets and blankets in hot water every week.   Encase pillows and mattress in dust mite proof covers.  Avoid having carpet if you can. If you have carpet, vacuum weekly.   Use a dust mask and HEPA vacuum.   Pollen and Outdoor Mold  Some people are allergic to trees, grass, or weed pollen, or molds. Try to keep your windows closed.  Limit time out doors when pollen count is high.   Ask you health care provider about taking medicine during allergy season.     Animal Dander  Some people are allergic to skin flakes, urine or saliva from pets  with fur or feathers. Keep pets with fur or feathers out of your home.    If you can t keep the pet outdoors, then keep the pet out of your bedroom.  Keep the bedroom door closed.  Keep pets off cloth furniture and away from stuffed toys.     Mice, Rats, and Cockroaches  Some people are allergic to the waste from these pests.   Cover food and garbage.  Clean up spills and food crumbs.  Store grease in the refrigerator.   Keep food out of the bedroom.   Indoor Mold  This can be a trigger if your home has high moisture. Fix leaking faucets, pipes, or other sources of water.   Clean moldy surfaces.  Dehumidify basement if it is damp and smelly.   Smoke, Strong Odors, and Sprays  These can reduce air quality. Stay away from strong odors and sprays, such as perfume, powder, hair spray, paints, smoke incense, paint, cleaning products, candles and new carpet.   Exercise or Sports  Some people with asthma have this trigger. Be active!  Ask your doctor about taking medicine before sports or exercise to prevent symptoms.    Warm up for 5-10 minutes before and after sports or exercise.     Other Triggers of Asthma  Cold air:  Cover your nose and mouth with a scarf.  Sometimes laughing or crying can be a trigger.  Some medicines and food can trigger asthma.

## 2023-07-21 NOTE — TELEPHONE ENCOUNTER
Patient  was referred by  to see . Patient is wondering if  would take her on as a new patient. Patient is leaving for [place] the first week of November so she would need to be seen in September or October. Please call patient and discuss at [phone]. Also if  is not willing, please set her up a new provider.

## 2023-07-23 DIAGNOSIS — F51.01 PRIMARY INSOMNIA: Primary | ICD-10-CM

## 2023-07-23 DIAGNOSIS — F41.9 ANXIETY: ICD-10-CM

## 2023-07-23 LAB — BACTERIA UR CULT: NO GROWTH

## 2023-07-23 RX ORDER — CLONAZEPAM 0.5 MG/1
0.5 TABLET ORAL 2 TIMES DAILY PRN
Qty: 30 TABLET | Refills: 0 | Status: SHIPPED | OUTPATIENT
Start: 2023-07-23 | End: 2024-05-07

## 2023-07-24 LAB — DEPRECATED CALCIDIOL+CALCIFEROL SERPL-MC: 59 UG/L (ref 20–75)

## 2023-07-25 LAB
IGG SERPL-MCNC: 831 MG/DL (ref 610–1616)
IGG1 SER-MCNC: 534 MG/DL (ref 382–929)
IGG2 SER-MCNC: 174 MG/DL (ref 242–700)
IGG3 SER-MCNC: 27 MG/DL (ref 22–176)
IGG4 SER-MCNC: 23 MG/DL (ref 4–86)
IGM SERPL-MCNC: 21 MG/DL (ref 35–242)
SUBCLASSES, PERCENT: 91 %

## 2023-07-25 NOTE — TELEPHONE ENCOUNTER
Central Prior Authorization Team   Phone: [phone]    PA Initiation    Medication: CORLANOR 5 MG PO TABS  Insurance Company: [employer] ([facility]) - Phone [phone] Fax [phone]  Pharmacy Filling the Rx: [address] - [address] [place] [address]  Filling Pharmacy Phone: [phone]  Filling Pharmacy Fax:    Start Date: 7/24/2023

## 2023-07-25 NOTE — TELEPHONE ENCOUNTER
Prior Authorization Approval    Medication: CORLANOR 5 MG PO TABS  Authorization Effective Date: 7/24/2023  Authorization Expiration Date: 12/31/2023  Insurance Company: [employer] ([facility]) - Phone [phone] Fax [phone]  Which Pharmacy is filling the prescription: [address] - [address] [place] [address]  Pharmacy Notified: Yes  Patient Notified: Yes (pharmacy will notify patient when ready)

## 2023-07-31 NOTE — TELEPHONE ENCOUNTER
Chart reviewed; PA looks to have been approved 7/25. Msg to patient to get a status update. -[facility]

## 2023-08-01 NOTE — TELEPHONE ENCOUNTER
Dilan Espinoza MD  You1 hour ago (9:25 AM)       Pharmacy consult sounds like a great idea. She had a lot of difficulty tolerating medication.     You  Dilan Espinoza MD1 hour ago (9:14 AM)     [facility] Collier was approved but apparently it gives her acid reflux and she has written that she is stopping it after 3 days. I encouraged her to try for a little longer and to take it with food. It seems like she has a few medication intolerances and there is a lot of back and forth on this encounter. Would a Pharm D/ Med team management consult be reasonable to see if they have any other ideas? She is requesting other suggestions, which it looks like you have already provided a few.  Thanks,  Ren RN             MTM management referral placed. Msg to schedulers. -[facility]

## 2023-08-03 ENCOUNTER — OFFICE VISIT (OUTPATIENT)
Dept: PHARMACY | Facility: CLINIC | Age: 78
End: 2023-08-03
Attending: GENERAL ACUTE CARE HOSPITAL
Payer: COMMERCIAL

## 2023-08-03 VITALS — SYSTOLIC BLOOD PRESSURE: 139 MMHG | DIASTOLIC BLOOD PRESSURE: 77 MMHG | HEART RATE: 61 BPM

## 2023-08-03 DIAGNOSIS — B00.9 HERPES SIMPLEX VIRUS (HSV) INFECTION: ICD-10-CM

## 2023-08-03 DIAGNOSIS — E78.2 MIXED HYPERLIPIDEMIA: ICD-10-CM

## 2023-08-03 DIAGNOSIS — I10 ESSENTIAL HYPERTENSION: Primary | ICD-10-CM

## 2023-08-03 DIAGNOSIS — F41.9 ANXIETY: ICD-10-CM

## 2023-08-03 DIAGNOSIS — R00.0 TACHYCARDIA: ICD-10-CM

## 2023-08-03 DIAGNOSIS — Z79.890 NEED FOR PROPHYLACTIC HORMONE REPLACEMENT THERAPY (POSTMENOPAUSAL): ICD-10-CM

## 2023-08-03 DIAGNOSIS — K21.9 GASTROESOPHAGEAL REFLUX DISEASE WITHOUT ESOPHAGITIS: ICD-10-CM

## 2023-08-03 DIAGNOSIS — J45.20 MILD INTERMITTENT ASTHMA WITHOUT COMPLICATION: ICD-10-CM

## 2023-08-03 DIAGNOSIS — G47.00 INSOMNIA, UNSPECIFIED TYPE: ICD-10-CM

## 2023-08-03 DIAGNOSIS — Z78.9 TAKES DIETARY SUPPLEMENTS: ICD-10-CM

## 2023-08-03 PROCEDURE — 99207 PR NO CHARGE LOS: CPT

## 2023-08-03 NOTE — Clinical Note
Hi Dr. Moran,   I met with Nicole, she is using her albuterol daily which could possibly be increasing tachycardia, do you think it is reasonable to try an ICS for maintenance to see if this can lessen use of albuterol?   Angelina Oconnor, PharmD Medication Therapy Management Pharmacist  [facility]

## 2023-08-03 NOTE — Clinical Note
Hi Dr. Espinoza,   Had a discussion with Nicole that options for tachycardia are either a cardio selective beta blocker such as metoprolol in hopes that this could lessen un-wanted side effects or could consider a Non-dihydropyridine calcium channel blocker such as diltiazem or verapamil. If patient were to choose a non-dihydropyridine, would need to discontinue amlodipine and monitor side effects from atorvastatin as both diltiazem or verapamil could increase risk of muscle pains caused by atorvastatin.   Patient wishes to try metoprolol first since it requires less adjustments to other medications. Would appreciate your thoughts.  Also working with PCP on asthma to try to lessen albuterol use as she is currently using daily.    Thanks,  Angelina Oconnor, PharmD Medication Therapy Management Pharmacist  [facility]

## 2023-08-03 NOTE — PATIENT INSTRUCTIONS
"Recommendations from today's MTM visit:                                                    MTM (medication therapy management) is a service provided by a clinical pharmacist designed to help you get the most of out of your medicines.   Today we reviewed what your medicines are for, how to know if they are working, that your medicines are safe and how to make your medicine regimen as easy as possible.      I will message Dr. Moran and Dr. Espinoza about potentially starting an inhaled steroid inhaler for maintenance to use during the months breathing is worse to lessen your use of albuterol     I will message Dr. Espinoza about trying metoprolol first then if that does not work try a Nondihydropyridine calcium channel blocker which would be either diltiazem or verapamil  Metoprolol is a beta blocker and works like propranolol but is more selective to your heart   Diltiazem and verapamil are Nondihydropyridine calcium channel blockers if we went with one of these we would have to stop your amlodipine and it may increase your risks of side effects with atorvastatin as well     Follow-up: Via My Chart after discussing with Dr. Moran and Dr. Espinoza     It was great speaking with you today.  I value your experience and would be very thankful for your time in providing feedback in our clinic survey. In the next few days, you may receive an email or text message from [facility] [employer] with a link to a survey related to your  clinical pharmacist.\"     To schedule another MTM appointment, please call the clinic directly or you may call the MTM scheduling line at [phone] or toll-free at [phone].     My Clinical Pharmacist's contact information:                                                      Please feel free to contact me with any questions or concerns you have.      Angelina Oconnor, PharmD  Medication Therapy Management Pharmacist   [facility]    "

## 2023-08-03 NOTE — PROGRESS NOTES
Medication Therapy Management (MTM) Encounter    ASSESSMENT:                            Medication Adherence/Access: No issues identified    Hypertension/Tachycardia: Discussed that options for tachycardia treatment include either changing to a more cardio selective beta blocker such as metoprolol in hopes that this could lessen un-wanted side effects or could consider a Non-dihydropyridine calcium channel blocker such as diltiazem or verapamil. If patient were to choose a non-dihydropyridine, would need to discontinue amlodipine and monitor side effects from atorvastatin as both diltiazem or verapamil could increase risk of muscle pains caused by atorvastatin. See interactions below per UpToDate:   Verapamil + Atorvastatin: May increase the serum concentration of Verapamil. Verapamil may increase the serum concentration of Atorvastatin. Management: Consider using lower doses of atorvastatin when used together with verapamil, and monitor closely for signs of HMG-CoA reductase inhibitor toxicity (eg, myositis, rhabdomyolysis, hepatotoxicity). Risk D: Consider therapy modification  Diltiazem + Atorvastatin: CY Inhibitors (Moderate) may increase the serum concentration of Atorvastatin. Risk C: Monitor therapy  At this time patient feels that trying metoprolol may be appropriate since it requires less adjustments to other medications, will discuss with Dr. Espinoza.     Hyperlipidemia: LDL slightly above goal of <115 but has improved some since last year, while not discussed today with the patient, may consider a atorvastatin dose increase in the future.     Insomnia/Anxiety: Stable.      Cold sores: Stable.     Asthma: Discussed that frequent use of albuterol could be contributing to tachycardia symptoms. Will message Dr. Moran to see if we can use an ICS maintenance inhaler while symptoms are uncontrolled now to hopefully limit albuterol use.     Hot flashes: Stable. While not discussed today, could consider changing  current therapy to a SSRI or SNRI to help with both anxiety and hot flashes and lessen DVT risk. Will discuss at follow up.     Heart burn: Stable.     Supplements: Stable.     PLAN:                            I will message Dr. Moran about potentially starting an inhaled steroid inhaler for maintenance to use during the months breathing is worse to lessen your use of albuterol     I will message Dr. Espinoza about trying metoprolol first then if that does not work try a Nondihydropyridine calcium channel blocker which would be either diltiazem or verapamil  Metoprolol is a beta blocker and works like propranolol but is more selective to your heart   Diltiazem and verapamil are Nondihydropyridine calcium channel blockers if we went with one of these we would have to stop your amlodipine and it may increase your risks of side effects with atorvastatin as well     Follow-up: Via My Chart after discussing with Dr. Moran and Dr. Espinoza     SUBJECTIVE/OBJECTIVE:                          Nicole Marcial is a 78 year old female coming in for an initial visit. She was referred to me from Dr. Espinoza. PCP is Dr. Moran.       Reason for visit: Cardiology/BP management, numerous drug intolerances and allergies.     Allergies/ADRs: Reviewed in chart  Past Medical History: Reviewed in chart  Tobacco: She reports that she has never smoked. She has never used smokeless tobacco.  Alcohol: Very rare. Is hoping to lose weight by avoiding alcohol.   Caffeine: 1 cup of coffee daily, could not cut back to 0 but did try.     Medication Adherence/Access: Patient uses pill box(es).  Patient takes medications 1 time(s) per day.   Per patient, misses medication 0 times per week.   Medication barriers: none.     Hypertension/Tachycardia:   Amlodipine 5mg daily   Propranolol 60mg daily- patient notes that yesterday she took 60mg once then pulse was around 112-163 so she had to take another dose, she does not know if the twice daily dosing caused less  side effects or not, she just re-stared taking propranolol in the last couple of days. She now is back to taking 2 tablets of propranolol daily and this has been effective to control heart rate but due to her intolerances, she is wondering if there is something else that she could try that could control her heart rate.  Medication history:  Propranolol: tiredness, lack of energy, rosacea and swelling (particularly at 120mg daily) also notes that her sister has had intolerances to beta-blockers in the past as well.   Ivabradine: reflux even when taking with meals- she started it last Friday (7/28) and took it Friday, Saturday, and Sunday.   Patient self-monitors blood pressure.  Home BP monitoring in range of 122/69 mm Hg.    7/30: 136/65  7/31: 125/67 P 74  8/1: 144/68 P 78  8/2: 140/66 P 166  8/3: 122/69 P 97  BP Readings from Last 3 Encounters:   07/21/23 120/70   06/19/23 122/66   06/07/23 (!) 170/95     Pulse Readings from Last 3 Encounters:   07/21/23 102   06/07/23 94   10/24/22 78     Hyperlipidemia:   atorvastatin 10mg daily  Patient reports no significant myalgias or other side effects.  Recent Labs   Lab Test 07/21/23  1030 09/28/22  0954   CHOL 183 219*   HDL 57 71   * 132*   TRIG 55 78     Insomnia/Anxiety:   Amitriptyline 25mg daily at bedtime- patient notes that this is effective  Clonazepam 0.5mg twice daily as needed- use is very rare, only using for extreme anxiety. Last dose was probably months ago.     Cold sores:   Acyclovir 5% ointment as needed for when she has a cold sore. Effective when needed.     Asthma:  Albuterol as needed- patient notes that most recently she has been using every day. Sometimes she goes months with out using.   Patient notes that asthma symptoms were worse when she was taking ivabradine, also notes the low air quality has been effecting her breathing negatively. Breathing has improved since stopping ivabradine. Notes that mornings are worse with wheezing and  coughing. Coughing a little during the night.     Hot flashes:   Estradiol-norethidrone 1 tablet daily- patient notes that this is very effective. Has tried to come off of this medication but hot flashes were not tolerable.      Heart burn:   Famotidine 10mg twice daily (specifically Pepcid complete at night)- notes that this has been effective.     Supplements:   Vitamin d 2000 units daily. Denies side effects.     Today's Vitals: /77   Pulse 61   ----------------  I spent 60 minutes with this patient today. I offer these suggestions for consideration by PCP & Dr. Espinoza. A copy of the visit note was provided to the patient's provider(s).    A summary of these recommendations was given to the patient.    Angelina Oconnor, PharmD  Medication Therapy Management Pharmacist   [facility]        Medication Therapy Recommendations  Mild intermittent asthma without complication    Current Medication: albuterol (PROAIR HFA/PROVENTIL HFA/VENTOLIN HFA) 108 (90 Base) MCG/ACT inhaler   Rationale: Synergistic therapy - Needs additional medication therapy - Indication   Recommendation: Start Medication - FLUTICASONE PROPIONATE HFA   Status: Accepted per Provider         Tachycardia    Current Medication: propranolol ER (INDERAL LA) 60 MG 24 hr capsule (Discontinued)   Rationale: Undesirable effect - Adverse medication event - Safety   Recommendation: Change Medication - METOPROLOL SUCCINATE   Status: Accepted per Provider

## 2023-08-04 ENCOUNTER — TELEPHONE (OUTPATIENT)
Dept: CARDIOLOGY | Facility: CLINIC | Age: 78
End: 2023-08-04
Payer: COMMERCIAL

## 2023-08-04 DIAGNOSIS — J45.20 MILD INTERMITTENT ASTHMA WITHOUT COMPLICATION: Primary | ICD-10-CM

## 2023-08-04 DIAGNOSIS — R00.0 TACHYCARDIA: Primary | ICD-10-CM

## 2023-08-04 RX ORDER — FLUTICASONE PROPIONATE 44 UG/1
1 AEROSOL, METERED RESPIRATORY (INHALATION) 2 TIMES DAILY
Qty: 10.6 G | Refills: 1 | Status: SHIPPED | OUTPATIENT
Start: 2023-08-04 | End: 2024-05-08

## 2023-08-04 RX ORDER — METOPROLOL SUCCINATE 25 MG/1
25 TABLET, EXTENDED RELEASE ORAL DAILY
Qty: 30 TABLET | Refills: 11 | Status: SHIPPED | OUTPATIENT
Start: 2023-08-04 | End: 2024-05-08

## 2023-08-04 NOTE — PROGRESS NOTES
Dilan Espinoza MD [place], [place], [facility]; Pamella Holland, RN  Pamella can we order metoprolol succinate 25 mg daily for Nicole? Thanks.    Dilan

## 2023-08-04 NOTE — TELEPHONE ENCOUNTER
Metoprolol prescription sent to patients preferred pharmacy.  ----------------------------------------  Message received  8/4/2023 @ 12:22  Dilan Espinoza MD [place], [place] [facility]; Pamella Holland RN [place] can we order metoprolol succinate 25 mg daily for Nicole? Thanks.    Dilan          Previous Messages       ----- Message -----  From: Rich Oconnor [facility]  Sent: 8/4/2023  12:15 PM CDT  To: Dilan Espinoza MD    The patient is open to it in replace of propranolol, do you want to send in the order?    Angelina Oconnor PharmD  Medication Therapy Management Pharmacist  [facility]    ----- Message -----  From: Dilan Espinoza MD  Sent: 8/4/2023  12:13 PM CDT  To: Rich Oconnor [facility]    Trying metoprolol first seems to make sense.  ----- Message -----  From: Rich Oconnor [facility]  Sent: 8/4/2023  12:04 PM CDT  To: Dilan Espinoza MD    Hi Dr. Espinoza,    Had a discussion with Nicole that options for tachycardia are either a cardio selective beta blocker such as metoprolol in hopes that this could lessen un-wanted side effects or could consider a Non-dihydropyridine calcium channel blocker such as diltiazem or verapamil. If patient were to choose a non-dihydropyridine, would need to discontinue amlodipine and monitor side effects from atorvastatin as both diltiazem or verapamil could increase risk of muscle pains caused by atorvastatin.    Patient wishes to try metoprolol first since it requires less adjustments to other medications. Would appreciate your thoughts.    Also working with PCP on asthma to try to lessen albuterol use as she is currently using daily.      Thanks,  Angelina Oconnor, Jessie  Medication Therapy Management Pharmacist  [facility]

## 2023-08-07 NOTE — PROGRESS NOTES
Amirah Moran MD [place], [place], [facility]  Yes--we can try that. I will send one in

## 2023-08-15 ENCOUNTER — OFFICE VISIT (OUTPATIENT)
Dept: FAMILY MEDICINE | Facility: CLINIC | Age: 78
End: 2023-08-15
Payer: COMMERCIAL

## 2023-08-15 VITALS
DIASTOLIC BLOOD PRESSURE: 84 MMHG | HEART RATE: 112 BPM | SYSTOLIC BLOOD PRESSURE: 163 MMHG | RESPIRATION RATE: 16 BRPM | OXYGEN SATURATION: 98 % | TEMPERATURE: 98.8 F

## 2023-08-15 DIAGNOSIS — R05.1 ACUTE COUGH: Primary | ICD-10-CM

## 2023-08-15 PROCEDURE — 99213 OFFICE O/P EST LOW 20 MIN: CPT | Performed by: PHYSICIAN ASSISTANT

## 2023-08-15 PROCEDURE — 87635 SARS-COV-2 COVID-19 AMP PRB: CPT | Performed by: PHYSICIAN ASSISTANT

## 2023-08-15 NOTE — PROGRESS NOTES
Assessment & Plan:      Problem List Items Addressed This Visit    None  Visit Diagnoses       Acute cough    -  Primary    Relevant Orders    Symptomatic COVID-19 Virus (Coronavirus) by PCR Nose          Medical Decision Making  Patient with history of asthma presents with cough for 1 to 2 weeks.  Physical exam appears reassuring with no signs of respiratory distress and clear lung auscultation.  No signs of asthma exacerbation.  Suspect likely ongoing sinus congestion and postnasal drip secondary to a viral upper respiratory infection.  Continue with conservative measures.     Subjective:      Nicole Marcial is a 78 year old female with history of asthma, here for evaluation of cough.  Onset of symptoms has been for 1 to 2 weeks.  Patient notes worsening cough now at night and some whistling when she breathes.  Otherwise no fevers or shortness of breath.  Patient has been trying albuterol and Flovent inhalers with minimal relief.     The following portions of the patient's history were reviewed and updated as appropriate: allergies, current medications, and problem list.     Review of Systems  Pertinent items are noted in HPI.    Allergies  Allergies   Allergen Reactions    Adhesive [Cyanoacrylate] Unknown     Contact allergy    Aspirin (Tartrazine Only) [Tartrazine] Unknown     Abdominal pain      Bee Venom Unknown    Cefuroxime Axetil [Cefuroxime] Unknown     Rash    Cephalosporins Unknown     Rash--ceftin    Chloral Hydrate Analogues [Chloral Hydrate] Unknown     Contact allergy    Chlorhexidine Gluconate [Chlorhexidine] Unknown     Contact allergy    Clindamycin Unknown     C diff    Codeine Unknown    Dairy [Milk (Cow)] Unknown    Doxepin Unknown     hyper    Ergot Alkaloids [Ergoloid Mesylates] Unknown    Erythromycin Base [Erythromycin] Unknown     Stomach pain    Formaldehyde Hives    Gluten [Gluten Meal] Unknown     Celiac panel not positive, patient states has abdominal pain when she eats gluten     Hydroxyzine Unknown    Latex Unknown    Morphine Unknown     Not effective    Nickel Unknown    Nortriptyline Unknown     hyper    Pregabalin Unknown    Pseudoephedrine Unknown    Sulfamethoxazole     Terconazole Unknown    Tramadol Unknown    Trazodone Nausea and Vomiting    Trimethoprim     Vancomycin Diarrhea    Venlafaxine Unknown    Vistaril [Hydroxyzine] Unknown    Menthyl Valerate Rash    Metronidazole Rash     Rash and neuropathy    Quaternium-15 Rash       Family History   Problem Relation Age of Onset    Osteoporosis Mother     Heart Disease Father     Hyperlipidemia Father     Anxiety Disorder Father     Substance Abuse Father     Sleep Apnea Sister     Snoring Sister     Heart Disease Sister     No Known Problems Daughter     No Known Problems Maternal Grandmother     No Known Problems Maternal Grandfather     No Known Problems Paternal Grandmother     No Known Problems Paternal Grandfather     No Known Problems Maternal Aunt     No Known Problems Paternal Aunt     Hypertension Brother     Depression Sister     Mental Illness Brother     Hereditary Breast and Ovarian Cancer Syndrome No family hx of     Breast Cancer No family hx of     Cancer No family hx of     Colon Cancer No family hx of     Endometrial Cancer No family hx of     Ovarian Cancer No family hx of        Social History     Tobacco Use    Smoking status: Never    Smokeless tobacco: Never   Substance Use Topics    Alcohol use: Yes     Alcohol/week: 6.0 standard drinks of alcohol     Comment: Alcoholic Drinks/day: hard cider        Objective:      BP (!) 163/84   Pulse 112   Temp 98.8  F (37.1  C) (Oral)   Resp 16   SpO2 98%   General appearance - alert, well appearing, and in no distress and non-toxic  Ears - TMs intact with moderate serous fluid, no bulging erythema  Nose - no significant tenderness to percussion of sinuses throughout  Mouth - mucous membranes moist, pharynx normal without lesions  Neck - supple, no significant  adenopathy  Chest - clear to auscultation, no wheezes, rales or rhonchi, symmetric air entry  Heart - normal rate, regular rhythm, normal S1, S2, no murmurs, rubs, clicks or gallops    The use of Dragon[employer] dictation services was used to construct the content of this note; any grammatical errors are non-intentional. Please contact the author directly if you are in need of any clarification.

## 2023-08-15 NOTE — PATIENT INSTRUCTIONS
Cough    You were seen today for a cough. This is likely due to a virus and will improve over the next 1-2 weeks on its own.    Symptom management:  - Drink plenty of non-caffeinated fluids  - Avoid smoke exposure  - May use tylenol or ibuprofen for discomfort  - Drink a warm non-caffeinated tea with honey  - Place a warm humidifier in your bedroom at night  - [employer] VaporRub    Reasons to return for re-evaluation:  - Develop a fever 100.4 or higher, current fever worsens, or fever does not improve in 72 hours  - Difficulty breathing or shortness of breath  - Cough continues to worsen including coughing up blood or coughing up thick, colored phlegm  - Unable to tolerate fluids    Otherwise, if symptoms have not improved in 7 days, follow-up with your primary care provider.

## 2023-08-16 ENCOUNTER — MYC MEDICAL ADVICE (OUTPATIENT)
Dept: FAMILY MEDICINE | Facility: CLINIC | Age: 78
End: 2023-08-16
Payer: COMMERCIAL

## 2023-08-16 ENCOUNTER — MYC MEDICAL ADVICE (OUTPATIENT)
Dept: FAMILY MEDICINE | Facility: CLINIC | Age: 78
End: 2023-08-16

## 2023-08-16 ENCOUNTER — NURSE TRIAGE (OUTPATIENT)
Dept: NURSING | Facility: CLINIC | Age: 78
End: 2023-08-16
Payer: COMMERCIAL

## 2023-08-16 ENCOUNTER — TELEPHONE (OUTPATIENT)
Dept: NURSING | Facility: CLINIC | Age: 78
End: 2023-08-16
Payer: COMMERCIAL

## 2023-08-16 LAB — SARS-COV-2 RNA RESP QL NAA+PROBE: POSITIVE

## 2023-08-16 NOTE — TELEPHONE ENCOUNTER
Nurse Triage SBAR    Is this a 2nd Level Triage? NO    Situation: Pt calling wanting to know if she could get PAXLOVID treatment.    Background: Pt states she tested positive for COVID yesterday but has been having a cough for 1-2 weeks. She also has asthma with a chronic cough.    Assessment: Pt states it is hard for her to differentiate the COVID cough from the asthma cough. Her chest is sore from coughing. She has a temp of 101.3 currently.     Protocol Recommended Disposition:   Discuss With PCP And Callback By Nurse Within 1 Hour    Recommendation: Pt has been messaging her PCP regarding the above . Advised her to do virtual appt as stated by them. She was transferred to scheduling to set up an appt.    Reason for Disposition   [1] HIGH RISK for severe COVID complications (e.g., weak immune system, age > 64 years, obesity with BMI of 30 or higher, pregnant, chronic lung disease or other chronic medical condition) AND [2] COVID symptoms (e.g., cough, fever)  (Exceptions: Already seen by PCP and no new or worsening symptoms.)    Additional Information   Negative: SEVERE difficulty breathing (e.g., struggling for each breath, speaks in single words)   Negative: Difficult to awaken or acting confused (e.g., disoriented, slurred speech)   Negative: Bluish (or gray) lips or face now   Negative: Shock suspected (e.g., cold/pale/clammy skin, too weak to stand, low BP, rapid pulse)   Negative: Sounds like a life-threatening emergency to the triager   Negative: MODERATE difficulty breathing (e.g., speaks in phrases, SOB even at rest, pulse 100-120)   Negative: SEVERE or constant chest pain or pressure  (Exception: Mild central chest pain, present only when coughing.)   Negative: Chest pain or pressure  (Exception: MILD central chest pain, present only when coughing)   Negative: Patient sounds very sick or weak to the triager   Negative: MILD difficulty breathing (e.g., minimal/no SOB at rest, SOB with walking, pulse  <100)   Negative: Fever > 103 F (39.4 C)   Negative: Headache and stiff neck (can't touch chin to chest)   Negative: Oxygen level (e.g., pulse oximetry) 90 percent or lower    Protocols used: Coronavirus (COVID-19) Diagnosed or [id number]    Isatu Nieves RN  [facility] Nurse Advisor   8/16/2023  5:33 PM

## 2023-08-16 NOTE — TELEPHONE ENCOUNTER
I recommend pt do virtual visit with a provider ASAP to determine if any further treatment is needed

## 2023-08-16 NOTE — TELEPHONE ENCOUNTER
Routing to Dr Moran to review and advise as writer will not be able to proceed with RN COVID TX Protocol d/t pt having symptom of cough for 1 to 2 weeks. Symptoms needs to be 7 days or less for writer to proceed to see if pt is eligible for Paxlovid treatment.    Please advise on next step for pt.    Ana Paula Abdullahi, JONNIEN, RN  [facility]

## 2023-08-16 NOTE — TELEPHONE ENCOUNTER
Patient classified as COVID treatment eligible by Epic high risk algorithm:  Yes    Coronavirus (COVID-19) Notification    Reason for call  Notify of POSITIVE COVID-19 lab result, assess symptoms,  review [facility] recommendations    Lab Result   Lab test for 2019-nCoV rRt-PCR or SARS-COV-2 PCR  Oropharyngeal AND/OR nasopharyngeal swabs were POSITIVE for 2019-nCoV RNA [OR] SARS-COV-2 RNA (COVID-19) RNA     We have been unable to reach patient by phone at this time to notify of their Positive COVID-19 result.    Left voicemail message requesting a call back to [phone] [facility] for results.        A Positive COVID-19 letter will be sent via [employer]) or the mail.    Adry Owens

## 2023-08-16 NOTE — TELEPHONE ENCOUNTER
Duplicate- please see other MyChart and Nurse Triage from today.    Ana Paula Abdullahi, JONNIEN, RN  [facility]

## 2023-08-16 NOTE — TELEPHONE ENCOUNTER
"Please see Nurse Triage encounter from today for details. Per [facility] notes:  \"Recommendation: Pt has been messaging her PCP regarding the above . Advised her to do virtual appt as stated by them. She was transferred to scheduling to set up an appt.\"    JONNIE ShethN, RN  [facility]    "

## 2023-08-22 ENCOUNTER — MYC MEDICAL ADVICE (OUTPATIENT)
Dept: FAMILY MEDICINE | Facility: CLINIC | Age: 78
End: 2023-08-22
Payer: COMMERCIAL

## 2023-08-22 DIAGNOSIS — F51.01 PRIMARY INSOMNIA: ICD-10-CM

## 2023-08-23 NOTE — TELEPHONE ENCOUNTER
"Routing refill request to provider for review/approval because:  BP out of range in past 12 months.   Script with refills 7/21/2023; should have refills on file. Patient reports pharmacy did not receive.    Last Written Prescription Date:  7/21/2023  Last Fill Quantity: 180,  # refills: 3   Last office visit: 7/21/2023   Future Office Visit:   Next 5 appointments (look out 90 days)      Sep 28, 2023 12:50 PM  (Arrive by 12:30 PM)  Provider Visit with Shreya Diaz MD  [facility] ([facility]) [address] [facility] [address]  [facility] [address] [phone]                   Requested Prescriptions   Pending Prescriptions Disp Refills    amitriptyline (ELAVIL) 25 MG tablet 180 tablet 3     Sig: Take 1 tablet (25 mg) by mouth At Bedtime       Tricyclic Agents ( Annual appt and no PHQ9) Failed - 8/22/2023  3:01 PM        Failed - Blood Pressure under 140/90 in past 12 mos     BP Readings from Last 3 Encounters:   08/15/23 (!) 163/84   08/03/23 139/77   07/21/23 120/70                 Passed - Recent (12 mo) or future (30 days) visit within authorizing provider's specialty     Patient has had an office visit with the authorizing provider or a provider within the authorizing providers department within the previous 12 mos or has a future within next 30 days. See \"Patient Info\" tab in inbasket, or \"Choose Columns\" in Meds & Orders section of the refill encounter.              Passed - Medication is active on med list        Passed - Patient is age 18 or older        Passed - Patient is not pregnant        Passed - No positive pregnancy test on record in past 12 mos             Lakisha Ruano RN 08/23/23 3:44 AM  "

## 2023-08-31 ENCOUNTER — OFFICE VISIT (OUTPATIENT)
Dept: FAMILY MEDICINE | Facility: CLINIC | Age: 78
End: 2023-08-31
Payer: COMMERCIAL

## 2023-08-31 ENCOUNTER — ANCILLARY PROCEDURE (OUTPATIENT)
Dept: GENERAL RADIOLOGY | Facility: CLINIC | Age: 78
End: 2023-08-31
Attending: PHYSICIAN ASSISTANT
Payer: COMMERCIAL

## 2023-08-31 VITALS
DIASTOLIC BLOOD PRESSURE: 82 MMHG | HEART RATE: 79 BPM | RESPIRATION RATE: 20 BRPM | OXYGEN SATURATION: 99 % | SYSTOLIC BLOOD PRESSURE: 146 MMHG | TEMPERATURE: 97.9 F

## 2023-08-31 DIAGNOSIS — R05.1 ACUTE COUGH: Primary | ICD-10-CM

## 2023-08-31 DIAGNOSIS — R05.1 ACUTE COUGH: ICD-10-CM

## 2023-08-31 PROCEDURE — 99214 OFFICE O/P EST MOD 30 MIN: CPT | Performed by: PHYSICIAN ASSISTANT

## 2023-08-31 PROCEDURE — 71046 X-RAY EXAM CHEST 2 VIEWS: CPT | Mod: TC | Performed by: RADIOLOGY

## 2023-08-31 RX ORDER — BENZONATATE 100 MG/1
100 CAPSULE ORAL 3 TIMES DAILY PRN
Qty: 21 CAPSULE | Refills: 0 | Status: SHIPPED | OUTPATIENT
Start: 2023-08-31 | End: 2023-09-28

## 2023-08-31 NOTE — PROGRESS NOTES
Patient presents with:  Cough: Several weeks   covid 19 positive 8/15/2023      Clinical Decision Making:  Patient experiencing sick symptoms following COVID infection.  Chest x-ray is clear today.  Suspect bacterial sinusitis.  Patient started on Augmentin.  She was also prescribed Tessalon Perles for cough suppression.  She is vitally stable.      ICD-10-CM    1. Acute cough  R05.1 XR Chest 2 Views     amoxicillin-clavulanate (AUGMENTIN) 875-125 MG tablet     benzonatate (TESSALON) 100 MG capsule          Patient Instructions   1.  Take antibiotic according to bottle instructions with food to avoid stomach upset.  If you are prone to stomach upset with antibiotics, I recommend adding a probiotic to this regimen.  Culturelle is a trusted brand.  2.  I recommend using Mucinex to help thin mucus secretions.  Adding a saline nasal spray can also be helpful with clearing sinus congestion.  3.  Take Tessalon Perles as needed for cough relief.   4.  Take Advil or Tylenol as needed for pain or fever control.  5.  Follow-up if you not having any improvement in your symptoms over the next 5 days.      HPI:  Nicole Marcial is a 78 year old female who presents today complaining of on going cough x several weeks. Patient was dx with COVID on 8/15/23, and she is concerned about the possibility of pneumonia.  She is also had a lot of postnasal drainage, maxillary sinus pain and pressure and productivity with her cough.  No known fevers.  She is not having flareups of coughing spells that take her breath away.  No pleuritic chest pain outside of coughing spells.    History obtained from the patient.    Problem List:  2022-05: Mixed hearing loss, bilateral  2021-11: Contact dermatitis and other eczema, due to unspecified cause  2021-11: Bilateral plantar fasciitis  2021-05: Chronic kidney disease, stage 3 (H)  2020-08: Mild intermittent asthma without complication  2018-12: Polyp of duodenum  2018-12: Gastro-esophageal reflux  disease with esophagitis  2018-11: Anxiety  2018-11: Primary insomnia  2018-11: Recurrent cold sores  2017-04: Nonspecific abdominal pain  2017-04: Diverticular disease of large intestine  2017-03: History of colonic polyps  2015-12: Benign neoplasm of transverse colon  2015-12: Polyp of colon  2015-10: Hyperparathyroidism (H)  2015-02: Guerrero's neuroma of left foot  2015-01: Drug allergy  2010-11: Sciatica  Hypertension  Lower Back Pain  Herpes Simplex Type I  Esophageal Reflux  Renal Cyst  Fibromyalgia  Taking Female Hormones For Postmenopausal HRT  Allergic Rhinitis  Lumbar radiculopathy  Intrinsic eczema  Diarrhea  Serum Enzyme Levels - Lipase Elevated  Osteoporosis Senile  Herniated Disc (L4 - L5) Right  Mixed hyperlipidemia  Selective deficiency of IgG (H)  Arthralgias In Multiple Sites  Temperature Intolerance To Cold   (Consistent)  Chronic Pain  Mild persistent asthma      Past Medical History:   Diagnosis Date    Arthritis     Back pain     Chronic pain     Depressive disorder     Eczema     Fibromyalgia     Hypertension     Mild asthma     Nerve pain     Osteoporosis     Thyroid disease Hyperparathyroid gone from surgery       Social History     Tobacco Use    Smoking status: Never    Smokeless tobacco: Never   Substance Use Topics    Alcohol use: Yes     Alcohol/week: 6.0 standard drinks of alcohol     Comment: Alcoholic Drinks/day: hard cider       Review of Systems    Vitals:    08/31/23 1148   BP: (!) 146/82   Pulse: 79   Resp: 20   Temp: 97.9  F (36.6  C)   SpO2: 99%       Physical Exam  Vitals and nursing note reviewed.   Constitutional:       General: She is not in acute distress.     Appearance: She is not toxic-appearing or diaphoretic.   HENT:      Head: Normocephalic and atraumatic.      Right Ear: External ear normal.      Left Ear: External ear normal.   Eyes:      Conjunctiva/sclera: Conjunctivae normal.   Cardiovascular:      Rate and Rhythm: Normal rate and regular rhythm.      Heart  sounds: No murmur heard.  Pulmonary:      Effort: Pulmonary effort is normal. No respiratory distress.      Breath sounds: No stridor. No wheezing, rhonchi or rales.   Neurological:      Mental Status: She is alert.   Psychiatric:         Mood and Affect: Mood normal.         Behavior: Behavior normal.         Thought Content: Thought content normal.         Judgment: Judgment normal.         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 COVID 8 15 23. Ongoing cough. Midl coarse lung sounds in left lower lobe.  COMPARISON: 03/07/2014      Impression    IMPRESSION: Some minimal fibrosis right lung base. Chest otherwise negative. No acute findings.         At the end of the encounter, I discussed results, diagnosis, medications. Discussed red flags for immediate return to clinic/ER, as well as indications for follow up if no improvement. Patient understood and agreed to plan. Patient was stable for discharge.

## 2023-08-31 NOTE — PATIENT INSTRUCTIONS
1.  Take antibiotic according to bottle instructions with food to avoid stomach upset.  If you are prone to stomach upset with antibiotics, I recommend adding a probiotic to this regimen.  Culturelle is a trusted brand.  2.  I recommend using Mucinex to help thin mucus secretions.  Adding a saline nasal spray can also be helpful with clearing sinus congestion.  3.  Take Tessalon Perles as needed for cough relief.   4.  Take Advil or Tylenol as needed for pain or fever control.  5.  Follow-up if you not having any improvement in your symptoms over the next 5 days.

## 2023-09-08 ENCOUNTER — MYC MEDICAL ADVICE (OUTPATIENT)
Dept: FAMILY MEDICINE | Facility: CLINIC | Age: 78
End: 2023-09-08
Payer: COMMERCIAL

## 2023-09-08 ENCOUNTER — NURSE TRIAGE (OUTPATIENT)
Dept: FAMILY MEDICINE | Facility: CLINIC | Age: 78
End: 2023-09-08
Payer: COMMERCIAL

## 2023-09-08 NOTE — TELEPHONE ENCOUNTER
Provider Recommendation Follow Up:   Reached patient/caregiver. Informed of provider's recommendations. Patient verbalized understanding and agrees with the plan.     No other questions or concerns at this time from pt.    CHARIS Sheth, RN  [facility]

## 2023-09-08 NOTE — TELEPHONE ENCOUNTER
"Nurse Triage SBAR    Is this a 2nd Level Triage? YES, LICENSED PRACTITIONER REVIEW IS REQUIRED    Situation:   From EvaEdison message today:  \"I assume I should stop taking Amoxicillin as today notice a head to toe rash.  Was prescribed it 8/31/23 at the walk in clinic and diagnosed with sinus infection.  Since I have taken it already for 8 1/2 days I assume I don t have to take anything else.  Sinuses are much improved, but still have lingering, annoying cough.  ( I did have Covid in August with this cough). Hoping someone gets back, as I assume Dr. Moran is no longer there and I haven t seen Dr. Atkinson yet.   (I was allergic to Penicillin drugs for many years, but in the last about 10 years was tested and found no longer allergic.)       Thanks for your help.\"    Background:   Long history or allergies to PCN and other meds that she had so many kind of rashes over the years and paying much attention to it.    Patient was prescribed amoxicillin-clavulanate (AUGMENTIN) 875-125 MG tablet- Take 1 tablet by mouth 2 times daily for 10 days for acute cough.    Assessment:   Patient stated that she was seen at [facility] on 8/31/23 and was started on Amoxicillin. She was fine until this morning when she noticed some rash, small red dots, all over her body.    From head to toes, noticed it about an hour ago and they are small red dots; not fluid filled or anything oozing from them.    Denied fever.  Denied difficulty breathing or swallowing; denied drooling.  No changes in detergents.  No new shampoo, soap or body wash, or perfume.  No new clothing.  Denied new changes in diets.  No new exposure to any irritants that she recalled.    Sinus infection sx H, such as the HA, lots of phlegm and not being able to breathe has improved considerably.    Protocol Recommended Disposition:   SEE IN OFFICE TODAY    Recommendation:   Recommended to be seen but pt stated that she is not worried to the point of needing to be seen today. She had " so many reactions in the past with rash. Patient asked to seek right right away in the mean time if rash is getting worst or she is having trouble breathing or swallowing.    She was wondering what her next steps should be regarding the Amoxicillin as she is almost done with it, but should she stop taking it d/t rash?    Please provide any other recommendations for her rash as well. She have not taken any antihistamine since she noticed the rash this morning.    Routed to provider    Does the patient meet one of the following criteria for ADS visit consideration? 16+ years old, with an [facility] PCP     TIP  Providers, please consider if this condition is appropriate for management at one of our Acute and Diagnostic Services sites.     If patient is a good candidate, please use dotphrase <dot>triageresponse and select Refer to ADS to document.    Additional Information   Negative: Difficulty breathing or wheezing   Negative: Hoarseness or cough that started soon after 1st dose of drug   Negative: Swollen tongue that started soon after first dose of drug   Negative: Fever and purple or blood-colored spots or dots   Negative: Too weak or sick to stand   Negative: Sounds like a life-threatening emergency to the triager   Negative: Rash is only on 1 part of the body (localized)   Negative: Taking new non-prescription (OTC) antihistamine, decongestant, ear drops, eye drops, or other OTC cough/cold medicine   Negative: Taking new prescription antihistamine, allergy medicine, asthma medicine, eye drops, ear drops or nose drops   Negative: Rash started more than 3 days after stopping new prescription medicine   Negative: Swollen tongue   Negative: Widespread hives and onset < 2 hours of exposure to 1st dose of drug   Negative: Patient sounds very sick or weak to the triager   Negative: Fever   Negative: Face or lip swelling   Negative: Purple or blood-colored spots or dots (no fever and sounds well to triager)   Negative: Joint  pain or swelling   Negative: Bloody crusts on lips or in mouth   Negative: Large or small blisters on skin (i.e., fluid filled bubbles or sacs)   Negative: Pregnant   Negative: Rash beginning within 4 hours of a new prescription medication   Negative: Patient wants to be seen   Hives or itching   Negative: Rash started within 3 days after antibiotic stopped   Negative: Taking new prescription medicine (EXCEPTIONs: finished taking new prescription antibiotic OR questions about flushing from niacin)   Negative: Taking new prescription antibiotic (EXCEPTION: finished taking new prescription antibiotic)   Negative: Vancomycin flush, questions about   Negative: Niacin flush suspected    Protocols used: Rash - Widespread on Drugs - Drug [id number]    Ana Paula Abdullahi, JONNIEN, RN  [facility]

## 2023-09-12 ENCOUNTER — OFFICE VISIT (OUTPATIENT)
Dept: FAMILY MEDICINE | Facility: CLINIC | Age: 78
End: 2023-09-12
Payer: COMMERCIAL

## 2023-09-12 ENCOUNTER — MYC MEDICAL ADVICE (OUTPATIENT)
Dept: FAMILY MEDICINE | Facility: CLINIC | Age: 78
End: 2023-09-12
Payer: COMMERCIAL

## 2023-09-12 VITALS
DIASTOLIC BLOOD PRESSURE: 86 MMHG | SYSTOLIC BLOOD PRESSURE: 145 MMHG | HEART RATE: 103 BPM | OXYGEN SATURATION: 98 % | RESPIRATION RATE: 18 BRPM | TEMPERATURE: 98.5 F

## 2023-09-12 DIAGNOSIS — L27.0 DRUG RASH: ICD-10-CM

## 2023-09-12 DIAGNOSIS — J01.10 ACUTE NON-RECURRENT FRONTAL SINUSITIS: Primary | ICD-10-CM

## 2023-09-12 PROCEDURE — 99213 OFFICE O/P EST LOW 20 MIN: CPT | Performed by: PHYSICIAN ASSISTANT

## 2023-09-12 NOTE — TELEPHONE ENCOUNTER
huddled with Dr. Viveros to discuss patient concerns as he had given his recommendations last week.    Dr. Umaña recommends patient be seen at walk-in clinic if symptoms are not improving or worsening.     Writer sent patient back in my chart with Dr. Umaña's recommendations.    CHARIS Zeng, RN  [facility]

## 2023-09-12 NOTE — PROGRESS NOTES
Patient presents with:  Sick: Itchy rash all over has a cough and scratchy throat       Clinical Decision Making:  Patient discontinued the amoxicillin.  Continue to monitor symptoms until resolution use of Zyrtec for antihistamine effect.  Return to see primary care provider if not getting good resolution or if new symptoms or concerns arise.      ICD-10-CM    1. Acute non-recurrent frontal sinusitis  J01.10       2. Drug rash  L27.0           Patient Instructions   Discontinue the amoxicillin  Take Zyrtec 1 time daily  Continue to monitor symptoms until resolution  Return to see your primary care provider if not getting good resolution or if new symptoms or concerns arise    HPI:  Nicole Marcial is a 78 year old female who presents today for a pruritic rash.  Patient was recently treated with amoxicillin for sinusitis.  Patient had discontinued the amoxicillin after 8 days on the antibiotic.  Patient does not have swelling of the lips mouth or tongue.     History obtained from chart review and the patient.    Problem List:  2022-05: Mixed hearing loss, bilateral  2021-11: Contact dermatitis and other eczema, due to unspecified cause  2021-11: Bilateral plantar fasciitis  2021-05: Chronic kidney disease, stage 3 (H)  2020-08: Mild intermittent asthma without complication  2018-12: Polyp of duodenum  2018-12: Gastro-esophageal reflux disease with esophagitis  2018-11: Anxiety  2018-11: Primary insomnia  2018-11: Recurrent cold sores  2017-04: Nonspecific abdominal pain  2017-04: Diverticular disease of large intestine  2017-03: History of colonic polyps  2015-12: Benign neoplasm of transverse colon  2015-12: Polyp of colon  2015-10: Hyperparathyroidism (H)  2015-02: Guerrero's neuroma of left foot  2015-01: Drug allergy  2010-11: Sciatica  Hypertension  Lower Back Pain  Herpes Simplex Type I  Esophageal Reflux  Renal Cyst  Fibromyalgia  Taking Female Hormones For Postmenopausal HRT  Allergic Rhinitis  Lumbar  radiculopathy  Intrinsic eczema  Diarrhea  Serum Enzyme Levels - Lipase Elevated  Osteoporosis Senile  Herniated Disc (L4 - L5) Right  Mixed hyperlipidemia  Selective deficiency of IgG (H)  Arthralgias In Multiple Sites  Temperature Intolerance To Cold   (Consistent)  Chronic Pain  Mild persistent asthma      Past Medical History:   Diagnosis Date    Arthritis     Back pain     Chronic pain     Depressive disorder     Eczema     Fibromyalgia     Hypertension     Mild asthma     Nerve pain     Osteoporosis     Thyroid disease Hyperparathyroid gone from surgery       Social History     Tobacco Use    Smoking status: Never    Smokeless tobacco: Never   Substance Use Topics    Alcohol use: Yes     Alcohol/week: 6.0 standard drinks of alcohol     Comment: Alcoholic Drinks/day: hard cider       Review of Systems  As above in HPI otherwise negative.    Vitals:    09/12/23 1010   BP: (!) 145/86   Pulse: 103   Resp: 18   Temp: 98.5  F (36.9  C)   TempSrc: Oral   SpO2: 98%       General: Patient is resting comfortably no acute distress is afebrile  HEENT: Head is normocephalic atraumatic   eyes are PERRL EOMI sclera anicteric   TMs are clear bilaterally  Throat is clear and oropharyngeal airway is patent  No cervical lymphadenopathy present  LUNGS: Clear to auscultation bilaterally  HEART: Regular rate and rhythm  Skin: With dermatographism and maculopapular rash that has raised wheals.    Physical Exam    At the end of the encounter, I discussed results, diagnosis, medications. Discussed red flags for immediate return to clinic/ER, as well as indications for follow up if no improvement. Patient understood and agreed to plan. Patient was stable for discharge.

## 2023-09-12 NOTE — PATIENT INSTRUCTIONS
Discontinue the amoxicillin  Take Zyrtec 1 time daily  Continue to monitor symptoms until resolution  Return to see your primary care provider if not getting good resolution or if new symptoms or concerns arise

## 2023-09-18 DIAGNOSIS — E78.2 MIXED HYPERLIPIDEMIA: ICD-10-CM

## 2023-09-18 RX ORDER — ATORVASTATIN CALCIUM 10 MG/1
10 TABLET, FILM COATED ORAL DAILY
Qty: 90 TABLET | Refills: 0 | OUTPATIENT
Start: 2023-09-18

## 2023-09-25 ENCOUNTER — HOSPITAL ENCOUNTER (OUTPATIENT)
Dept: MAMMOGRAPHY | Facility: CLINIC | Age: 78
Discharge: HOME | End: 2023-09-25
Attending: FAMILY MEDICINE | Admitting: FAMILY MEDICINE
Payer: COMMERCIAL

## 2023-09-25 DIAGNOSIS — Z12.31 VISIT FOR SCREENING MAMMOGRAM: ICD-10-CM

## 2023-09-25 PROCEDURE — 77067 SCR MAMMO BI INCL CAD: CPT

## 2023-09-27 RX ORDER — POLYETHYLENE GLYCOL 3350, SODIUM SULFATE ANHYDROUS, SODIUM BICARBONATE, SODIUM CHLORIDE, POTASSIUM CHLORIDE 236; 22.74; 6.74; 5.86; 2.97 G/4L; G/4L; G/4L; G/4L; G/4L
POWDER, FOR SOLUTION ORAL
COMMUNITY
Start: 2023-05-23 | End: 2024-01-25

## 2023-09-27 RX ORDER — SULFAMETHOXAZOLE/TRIMETHOPRIM 800-160 MG
1 TABLET ORAL EVERY 12 HOURS
COMMUNITY
End: 2024-01-25

## 2023-09-27 RX ORDER — FLUCONAZOLE 150 MG/1
TABLET ORAL
COMMUNITY
End: 2024-01-25

## 2023-09-27 RX ORDER — DOXYCYCLINE 100 MG/1
1 CAPSULE ORAL 2 TIMES DAILY
COMMUNITY
End: 2024-01-25

## 2023-09-27 RX ORDER — HYDROCODONE BITARTRATE AND ACETAMINOPHEN 5; 325 MG/1; MG/1
1 TABLET ORAL EVERY 6 HOURS PRN
COMMUNITY
End: 2024-01-25

## 2023-09-27 NOTE — COMMUNITY RESOURCES LIST (ENGLISH)
09/27/2023    [facility] [employer] [facility] Information  N/A  For questions about this resource list or additional care needs, please contact your primary care clinic or care manager.  Phone: [phone]   Email: N/A   Address: [address] [place] [id number]   Hours: N/A        Financial Stability       Utility payment assistance  1  [place] [employer] - [place]'s Telephone Assistance Plan (TAP) and Federal Lifeline and Affordable Connectivity Program (ACP) Distance: 1.44 miles      Phone/Virtual   [address] [place] [address]  Language: English  Fees: Free   Phone: (167) 622-1678 Email: consumer.puc@[facility]. Website: https://[place].gov[place]puc/consumers/telephone/     2  Community Action Partnership (CAP) [facility] - Energy Assistance Distance: 2.04 miles      Phone/Virtual   [address] [place] [address]  Language: English, Hmong, Afghan, Ivorian  Hours: Mon - Fri 8:00 AM - 12:00 PM , Mon - Fri 1:00 PM - 4:30 PM  Fees: Free   Phone: (599) 651-5617 Email: goldy@caprw.org Website: http://www.caprw.org          Important Numbers & Websites       Emergency Services   911  City Services   311  Poison Control   (188) 676-6904  Suicide Prevention Lifeline   (167) 939-9709 (TALK)  Child Abuse Hotline   (849) 340-4299 (4-A-Child)  Sexual Assault Hotline   (312) 380-2174 (HOPE)  National Runaway Safeline   (685) 836-5883 (RUNAWAY)  All-Options Talkline   (611) 989-7445  Substance Abuse Referral   (523) 793-6129 (HELP)

## 2023-09-28 ENCOUNTER — OFFICE VISIT (OUTPATIENT)
Dept: FAMILY MEDICINE | Facility: CLINIC | Age: 78
End: 2023-09-28
Payer: COMMERCIAL

## 2023-09-28 VITALS
HEART RATE: 75 BPM | BODY MASS INDEX: 28.3 KG/M2 | SYSTOLIC BLOOD PRESSURE: 134 MMHG | RESPIRATION RATE: 20 BRPM | HEIGHT: 61 IN | OXYGEN SATURATION: 97 % | DIASTOLIC BLOOD PRESSURE: 74 MMHG | TEMPERATURE: 97.7 F | WEIGHT: 149.91 LBS

## 2023-09-28 DIAGNOSIS — J45.30 MILD PERSISTENT ASTHMA WITHOUT COMPLICATION: Primary | ICD-10-CM

## 2023-09-28 DIAGNOSIS — N18.31 STAGE 3A CHRONIC KIDNEY DISEASE (H): ICD-10-CM

## 2023-09-28 DIAGNOSIS — R21 RASH AND NONSPECIFIC SKIN ERUPTION: ICD-10-CM

## 2023-09-28 DIAGNOSIS — M25.471 ANKLE SWELLING, RIGHT: ICD-10-CM

## 2023-09-28 PROCEDURE — 99214 OFFICE O/P EST MOD 30 MIN: CPT | Performed by: FAMILY MEDICINE

## 2023-09-28 ASSESSMENT — ASTHMA QUESTIONNAIRES: ACT_TOTALSCORE: 18

## 2023-09-28 ASSESSMENT — PAIN SCALES - GENERAL: PAINLEVEL: MODERATE PAIN (4)

## 2023-09-28 NOTE — COMMUNITY RESOURCES LIST (ENGLISH)
09/28/2023    [employer] [place] [employer]  N/A  For questions about this resource list or additional care needs, please contact your primary care clinic or care manager.  Phone: [phone]   Email: N/A   Address: [facility] [place] [address]   Hours: N/A        Financial Stability       Utility payment assistance  1  [place] [employer] Commission - [place]'s Telephone Assistance Plan (TAP) and Federal Lifeline and Affordable Connectivity Program (ACP) Distance: 1.44 miles      Phone/Virtual   [address] [place] [address]  Language: English  Fees: Free   Phone: (661) 621-8941 Email: consumer.puc@[facility]. Website: https://[place]puc/consumers/telephone/     2  Community Action Partnership (CAP) [facility] - Energy Assistance Distance: 2.04 miles      Phone/Virtual   [address] [place] [address]  Language: English, Hmong, Marshallese, Singaporean  Hours: Mon - Fri 8:00 AM - 12:00 PM , Mon - Fri 1:00 PM - 4:30 PM  Fees: Free   Phone: (184) 160-7845 Email: goldy@caprw.org Website: http://www.caprw.org          Important Numbers & Websites       Emergency Services   911  City Services   311  Poison Control   (361) 197-6118  Suicide Prevention Lifeline   (298) 748-5838 (TALK)  Child Abuse Hotline   (704) 489-6012 (4-A-Child)  Sexual Assault Hotline   (661) 667-8569 (HOPE)  National Runaway Safeline   (673) 381-7404 (RUNAWAY)  All-Options Talkline   (291) 706-3820  Substance Abuse Referral   (284) 528-3016 (HELP)

## 2023-09-28 NOTE — LETTER
My Asthma Action Plan    Name: Nicole Marcial   YOB: 1945  Date: 9/28/2023   My doctor: Shreya Diaz MD   My clinic: [facility]        My Control Medicine: Fluticasone propionate (Flovent HFA) - 44 mcg 1 puff twice daily during spring and fall  My Rescue Medicine: Albuterol (Proair/Ventolin/Proventil HFA) 2-4 puffs EVERY 4 HOURS as needed. Use a spacer if recommended by your provider.   My Asthma Severity:   Mild Persistent  Know your asthma triggers: pollens and poor air quality               GREEN ZONE   Good Control  I feel good  No cough or wheeze  Can work, sleep and play without asthma symptoms       Take your asthma control medicine every day.     If exercise triggers your asthma, take your rescue medication  15 minutes before exercise or sports, and  During exercise if you have asthma symptoms  Spacer to use with inhaler: If you have a spacer, make sure to use it with your inhaler             YELLOW ZONE Getting Worse  I have ANY of these:  I do not feel good  Cough or wheeze  Chest feels tight  Wake up at night   Keep taking your Green Zone medications  Start taking your rescue medicine:  every 20 minutes for up to 1 hour. Then every 4 hours for 24-48 hours.  If you stay in the Yellow Zone for more than 12-24 hours, contact your doctor.  If you do not return to the Green Zone in 12-24 hours or you get worse, start taking your oral steroid medicine if prescribed by your provider.           RED ZONE Medical Alert - Get Help  I have ANY of these:  I feel awful  Medicine is not helping  Breathing getting harder  Trouble walking or talking  Nose opens wide to breathe       Take your rescue medicine NOW  If your provider has prescribed an oral steroid medicine, start taking it NOW  Call your doctor NOW  If you are still in the Red Zone after 20 minutes and you have not reached your doctor:  Take your rescue medicine again and  Call 911 or go to the emergency room right  away    See your regular doctor within 2 weeks of an Emergency Room or Urgent Care visit for follow-up treatment.          Annual Reminders:  Meet with Asthma Educator,  Flu Shot in the Fall, consider Pneumonia Vaccination for patients with asthma (aged 19 and older).    Pharmacy:    [facility] [place] [address] [place], [place] - [address] [place] [address] PHARMACY [facility] - [place] - [address] [id number] [place], [place] - [address] [facility]    Electronically signed by Shreya Diaz MD   Date: 09/28/23                      Asthma Triggers  How To Control Things That Make Your Asthma Worse    Triggers are things that make your asthma worse.  Look at the list below to help you find your triggers and what you can do about them.  You can help prevent asthma flare-ups by staying away from your triggers.      Trigger                                                          What you can do   Cigarette Smoke  Tobacco smoke can make asthma worse. Do not allow smoking in your home, car or around you.  Be sure no one smokes at a child s day care or school.  If you smoke, ask your health care provider for ways to help you quit.  Ask family members to quit too.  Ask your health care provider for a referral to Quit Plan to help you quit smoking, or call 1-453-668-PLAN.     Colds, Flu, Bronchitis  These are common triggers of asthma. Wash your hands often.  Don t touch your eyes, nose or mouth.  Get a flu shot every year.     Dust Mites  These are tiny bugs that live in cloth or carpet. They are too small to see. Wash sheets and blankets in hot water every week.   Encase pillows and mattress in dust mite proof covers.  Avoid having carpet if you can. If you have carpet, vacuum weekly.   Use a dust mask and HEPA vacuum.   Pollen and Outdoor Mold  Some people are allergic to trees, grass, or weed pollen, or molds. Try to keep your windows closed.  Limit time out doors when pollen count is high.    Ask you health care provider about taking medicine during allergy season.     Animal Dander  Some people are allergic to skin flakes, urine or saliva from pets with fur or feathers. Keep pets with fur or feathers out of your home.    If you can t keep the pet outdoors, then keep the pet out of your bedroom.  Keep the bedroom door closed.  Keep pets off cloth furniture and away from stuffed toys.     Mice, Rats, and Cockroaches   Some people are allergic to the waste from these pests.   Cover food and garbage.  Clean up spills and food crumbs.  Store grease in the refrigerator.   Keep food out of the bedroom.   Indoor Mold  This can be a trigger if your home has high moisture. Fix leaking faucets, pipes, or other sources of water.   Clean moldy surfaces.  Dehumidify basement if it is damp and smelly.   Smoke, Strong Odors, and Sprays  These can reduce air quality. Stay away from strong odors and sprays, such as perfume, powder, hair spray, paints, smoke incense, paint, cleaning products, candles and new carpet.   Exercise or Sports  Some people with asthma have this trigger. Be active!  Ask your doctor about taking medicine before sports or exercise to prevent symptoms.    Warm up for 5-10 minutes before and after sports or exercise.     Other Triggers of Asthma  Cold air:  Cover your nose and mouth with a scarf.  Sometimes laughing or crying can be a trigger.  Some medicines and food can trigger asthma.

## 2023-09-28 NOTE — COMMUNITY RESOURCES LIST (ENGLISH)
09/28/2023   [facility] - Outpatient Clinics  N/A  For additional resource needs, please contact your health insurance member services or your primary care team.  Phone: [phone]   Email: N/A   Address: [facility] [place] [address]   Hours: N/A        Financial Stability       Utility payment assistance  1  [place] [employer] - [place]'s Telephone Assistance Plan (TAP) and Federal Lifeline and Affordable Connectivity Program (ACP) Distance: 1.44 miles      Phone/Virtual   [address] [place] [address]  Language: English  Fees: Free   Phone: (699) 284-9669 Email: consumer.puc@[facility].mn. Website: https://[place].gov/puc/consumers/telephone/     2  [place] [employer] - Energy and Utilities Distance: 7.38 miles      In-Person, Phone/Virtual   [address] [place] [address]  Language: English  Hours: Mon - Fri 8:30 AM - 4:30 PM  Fees: Free   Phone: (779) 286-9412 Website: https://[place].gov/[employer]/energy/consumer-assistance/energy-assistance-program/          Important Numbers & Websites       [facility]   211 211itedway.org  Poison Control   (926) 213-1164 [place]poison.[place]  Suicide and Crisis Lifeline   [address] [facility]  Childhelp [place] Child Abuse Hotline   649.415.8642 Childhelphotline.org  National Sexual Assault Hotline   (408) 551-1535 (HOPE) Rainn.org  National Runaway Safeline   (831) 799-2281 (RUNAWAY) 1800runaway.org  Pregnancy & Postpartum Support [place]   Call/text 316-186-2064 Ppsupportmn.org  Substance Abuse National Helpline ([facility]   879-192-HELP (1096) Findtreatment.gov  Emergency Services   911

## 2023-09-28 NOTE — COMMUNITY RESOURCES LIST (ENGLISH)
09/28/2023    [employer]  N/A  For questions about this resource list or additional care needs, please contact your primary care clinic or care manager.  Phone: [phone]   Email: N/A   Address: [facility] [place] [address]   Hours: N/A        Financial Stability       Utility payment assistance  1  [place] [employer] - [place]'s Telephone Assistance Plan (TAP) and Federal Lifeline and Affordable Connectivity Program (ACP) Distance: 1.44 miles      Phone/Virtual   [address] [place] [address]  Language: English  Fees: Free   Phone: (381) 423-5253 Email: consumer.puc@[facility]. Website: https://[place].gov/puc/consumers/telephone/     2  Community Action Partnership (CAP) [facility] - Energy Assistance Distance: 2.04 miles      Phone/Virtual   [address] [place] [address]  Language: English, Hmong, Citizen of [place], Bel[place]an  Hours: Mon - Fri 8:00 AM - 12:00 PM , Mon - Fri 1:00 PM - 4:30 PM  Fees: Free   Phone: (966) 233-1509 Email: goldy@caprw.org Website: http://www.caprw.org          Important Numbers & Websites       Emergency Services   911  City Services   311  Poison Control   (752) 729-8699  Suicide Prevention Lifeline   (993) 828-5985 (TALK)  Child Abuse Hotline   (843) 635-8316 (4-A-Child)  Sexual Assault Hotline   (350) 764-1776 (HOPE)  National Runaway Safeline   (262) 305-7930 (RUNAWAY)  All-Options Talkline   (656) 681-1967  Substance Abuse Referral   (832) 678-2504 (HELP)

## 2023-09-28 NOTE — PATIENT INSTRUCTIONS
Your Flovent as a preventative inhaler.  Take 1 puff twice daily during spring and fall seasons and when the air pathology is poor.  Continue to use albuterol as needed.  If you are feeling tight, wheezy, or coughing, Flovent will not help in the moment, use albuterol then.    Help with your rash, take loratadine 10 mg twice daily for the next 2 weeks.  If your rash is not improving, consider seeing your dermatologist.    We could consider transitioning from amlodipine to lisinopril in the future to help protect your kidneys.

## 2023-09-28 NOTE — COMMUNITY RESOURCES LIST (ENGLISH)
09/28/2023   [facility] - Outpatient Clinics  N/A  For additional resource needs, please contact your health insurance member services or your primary care team.  Phone: [phone]   Email: N/A   Address: [facility] [place] [address]   Hours: N/A        Financial Stability       Utility payment assistance  1  [place] [employer] Commission - [place]'s Telephone Assistance Plan (TAP) and Federal Lifeline and Affordable Connectivity Program (ACP) Distance: 1.44 miles      Phone/Virtual   [address] [place] [address]  Language: English  Fees: Free   Phone: (926) 264-9360 Email: consumer.puc@[facility].mn. Website: https://[place].gov/puc/consumers/telephone/     2  [place] [employer] - Energy and Utilities Distance: 7.38 miles      In-Person, Phone/Virtual   [address] [place] [address]  Language: English  Hours: Mon - Fri 8:30 AM - 4:30 PM  Fees: Free   Phone: (751) 516-8707 Website: https://[place].gov/[employer]/energy/consumer-assistance/energy-assistance-program/          Important Numbers & Websites       [facility]   211 211itedway.org  Poison Control   (672) 376-3122 [place]poison.org  Suicide and Crisis Lifeline   [address] 8[facility].org  Childhelp [place] Child Abuse Hotline   631.689.3914 Childhelphotline.org  National Sexual Assault Hotline   (102) 654-4995 (HOPE) Rainn.org  National Runaway Safeline   (847) 983-4429 (RUNAWAY) 1800runaway.org  Pregnancy & Postpartum Support [place]   Call/text 645-283-0421 Ppsupportmn.org  Substance Abuse National Helpline ([facility]   601-914-HELP (4452) Findtreatment.gov  Emergency Services   911

## 2023-09-28 NOTE — PROGRESS NOTES
"  Assessment & Plan     Mild persistent asthma without complication  Asthma action plan completed today.  Discussed effects of Flovent versus effects of albuterol and instructed in appropriate use.  Advised Flovent use during spring and fall months or if air quality shifts.  Continue albuterol as needed.    Ankle swelling, right  Likely allergic, no red flag symptoms.  Would monitor.    Rash and nonspecific skin eruption  Likely related to recent COVID infection.  This is not hives in nature.  It does not look consistent with psoriasis though she could have guttate psoriasis or nummular eczema with a similar appearance.  Advised that she try increasing loratadine to 10 mg twice daily for 2 weeks.  Consider dermatology if persisting.    Stage 3a chronic kidney disease (H)  Discussed importance of avoiding nephrotoxic agents.  Could give consideration to transitioning from amlodipine to lisinopril in the future for renal protective effects.             BMI:   Estimated body mass index is 28.12 kg/m  as calculated from the following:    Height as of this encounter: 1.555 m (5' 1.22\").    Weight as of this encounter: 68 kg (149 lb 14.6 oz).           Shreya Diaz MD  [facility]    Yoshi Rivera is a 78 year old, presenting for the following health issues:  Establish Care and Derm Problem      9/28/2023    12:50 PM   Additional Questions   Roomed by Tracey       Here to establish care with me, previously received her care from Dr. Moran.  She was recently prescribed Flovent by [facility] pharmacist, is uncertain if it makes a difference.  Uses just when the air quality is poor as needed and has not noted much improvement compared albuterol.  Has never taken it consistently.  Notes she had COVID infection 1 month ago and has had a rash on her arms and legs since then, seems to get better and then worse, sometimes itchy.  Using CeraVe lotion on it.  Notes right ankle medial swelling after a bug bite " "about 3 or 4 weeks ago, no redness or limitations in her function, has Planter fasciitis bilaterally.  Has clonazepam available, uses on occasion for anxiety, usually when it interrupts her sleep, last prescription of 30 tabs lasted for years, she oftentimes uses just a half tab at a time.  History of chronic kidney disease stage IIIa, wonders what can be done to protect her kidneys further.  History of tachycardia followed by cardiology.    She is a retired , .  No children.  Lives in [place] in the winter, usually goes down there in November.  She lives in the [facility] nearby.  Enjoys gardening.  She has a border terrier dog that she enjoys walking.    History of Present Illness       Reason for visit:  Meet new  following Dr. Moran s exit of clinic    She eats 2-3 servings of fruits and vegetables daily.She consumes 0 sweetened beverage(s) daily.She exercises with enough effort to increase her heart rate 60 or more minutes per day.  She exercises with enough effort to increase her heart rate 7 days per week.   She is taking medications regularly.                 Review of Systems         Objective    /74 (BP Location: Left arm, Patient Position: Sitting, Cuff Size: Adult Regular)   Pulse 75   Temp 97.7  F (36.5  C) (Temporal)   Resp 20   Ht 1.555 m (5' 1.22\")   Wt 68 kg (149 lb 14.6 oz)   LMP  (LMP Unknown)   SpO2 97%   BMI 28.12 kg/m    Body mass index is 28.12 kg/m .  Physical Exam   Alert and very pleasant.  Mucous membranes moist.  Heart with regular rate and rhythm.  Lungs clear.  Erythematous to slightly tan plaques with intermittent scaling noted scattered on her forearms, largest is 1 cm in diameter.  Nothing on her trunk.  Slight edema noted right ankle medially.                    "

## 2023-10-09 ENCOUNTER — OFFICE VISIT (OUTPATIENT)
Dept: CARDIOLOGY | Facility: CLINIC | Age: 78
End: 2023-10-09
Payer: COMMERCIAL

## 2023-10-09 VITALS
OXYGEN SATURATION: 94 % | BODY MASS INDEX: 28.14 KG/M2 | HEART RATE: 78 BPM | DIASTOLIC BLOOD PRESSURE: 74 MMHG | SYSTOLIC BLOOD PRESSURE: 148 MMHG | RESPIRATION RATE: 16 BRPM | WEIGHT: 150 LBS

## 2023-10-09 DIAGNOSIS — E78.5 HYPERLIPIDEMIA, UNSPECIFIED HYPERLIPIDEMIA TYPE: ICD-10-CM

## 2023-10-09 DIAGNOSIS — Z01.810 PRE-OPERATIVE CARDIOVASCULAR EXAMINATION: ICD-10-CM

## 2023-10-09 DIAGNOSIS — I10 ESSENTIAL HYPERTENSION: Primary | ICD-10-CM

## 2023-10-09 DIAGNOSIS — I47.11 INAPPROPRIATE SINUS TACHYCARDIA (H): ICD-10-CM

## 2023-10-09 PROCEDURE — 99214 OFFICE O/P EST MOD 30 MIN: CPT | Performed by: GENERAL ACUTE CARE HOSPITAL

## 2023-10-09 NOTE — LETTER
10/9/2023    Shreya Diaz MD  [address]  [facility] [address]    RE: Nicole Marcial       Dear Colleague,     I had the pleasure of seeing Nicole Marcial in the [facility].  HEART CARE ENCOUNTER NOTE          Assessment/Recommendations   Assessment:    Preoperative evaluation prior to esophagogastroduodenoscopy and colonoscopy scheduled for next week. She is well-compensated from a cardiac standpoint with no active cardiac symptoms. She is at a low risk of perioperative major adverse cardiac events.  Inappropriate sinus tachycardia. Currently controlled.  Minimal nonobstructive coronary artery disease seen on CT coronary angiography 6/19/2023. She denies angina.  Essential hypertension. Elevated today but she reports adequate control at home.  Hyperlipidemia. Last  mg/dL.  Body mass index is 28.14 kg/m .    Plan:  Continue metoprolol succinate 25 mg daily.  Amlodipine 5 mg daily.  Atorvastatin 10 mg daily.  From a cardiac standpoint, she may proceed with her planned procedure without need for further testing or treatment.  Follow-up with me in 6 months.         History of Present Illness   Ms. Nicole Marcial is a 78 year old female with a significant past history of HTN, nonobstructive CAD and inappropriate sinus tachycardia presenting for follow-up.    She was noting chest pain at her last visit, prompting CT coronary angiography 6/19/2023 which showed minimal nonobstructive CAD. Her chest pain has resolved.     She was on propranolol for her sinus tachycardia but wanted to try switching to ivabradine. This seemed to worsen acid reflux symptoms so she was switched to metoprolol succinate 25 mg daily and tolerating this. Her BP at home is running 120-140s systolic and HR 70-100s.     No chest pain/pressure/tightness, shortness of breath at rest or with exertion, light headedness/dizziness, pre-syncope, syncope, lower extremity swelling, palpitations, paroxysmal  nocturnal dyspnea (PND), or orthopnea.    She is scheduled for an EGD/colonoscopy next week.     Cardiac Problems and Cardiac Diagnostics     Most Recent Cardiac testing:  ECG dated 6/7/2023 (personaly reviewed and interpreted): SR, otherwise normal     Sleep study 6/8/2016 (report reviewed):   Sleep apnea was not seen in this study.   The severity of sleep-disordered breathing can be underestimated during portable test because the apnea/hypopnea index is calculated using total recording time rather than total sleep time. A full night in-lab polysomnography can be considered.   Nasal CPAP is not indicated.   Suggest optimizing sleep hygiene and avoiding any further sleep deprivation.   Recommend evaluation due to periodic leg movements.   Measures aimed at increasing sleep consolidation can be beneficial.     Holter monitor 7/21/2022 (report reviewed):  1. Normal Holter monitor recording. Patient demonstrates a tendency towards sinus tachycardia during the waking hours. Average heart rate 107 bpm. Appropriate nocturnal heart rate slowing is observed. Sources for sinus tachycardia including dehydration, anemia, hypoxia, endocrine abnormality etc. should be considered. Patient may have postural tachycardia syndrome.   2. Patient had multiple symptomatic recordings all of which corresponded to sinus tachycardia.   3. Rare atrial ectopy and no sustained atrial tachyarrhythmia.   4. No ventricular ectopy   5. No profound bradycardia or pauses.     ECHO 7/21/2022 (report reviewed):   There is borderline concentric left ventricular hypertrophy.  The visual ejection fraction is 55-60%.  No regional wall motion abnormalities noted.  No significant valve disease.     Stress test 11/2/2022 (report reviewed):   1. Objective: No electrocardiographic evidence of ischemia, but with reduced sensitivity due to not attaining target heart rate (patient achieved 71% of predicted maximal heart rate for age).  2. Subjective: Patient  reported 1/10 chest discomfort with exercise.  3. Average functional capacity for age.    CT coronary angiogram 6/19/2023 (report reviewed):    Minimal nonobstructive atherosclerotic coronary artery disease.    Radiology review for incidental non cardiac findings will be under separate report by the radiologist.     Medications  Allergies   Current Outpatient Medications   Medication Sig Dispense Refill    acyclovir (ZOVIRAX) 5 % external ointment Apply topically 5 times daily 30 g 3    albuterol (PROAIR HFA/PROVENTIL HFA/VENTOLIN HFA) 108 (90 Base) MCG/ACT inhaler Inhale 2 puffs into the lungs every 6 hours as needed for shortness of breath, wheezing or cough 18 g 3    amitriptyline (ELAVIL) 25 MG tablet Take 1 tablet (25 mg) by mouth At Bedtime 180 tablet 3    amLODIPine (NORVASC) 5 MG tablet Take 1 tablet (5 mg) by mouth daily 90 tablet 3    atorvastatin (LIPITOR) 10 MG tablet Take 1 tablet (10 mg) by mouth daily 90 tablet 3    cholecalciferol, vitamin D3, 1,000 unit tablet [CHOLECALCIFEROL, VITAMIN D3, 1,000 UNIT TABLET] Take 2,000 Units by mouth daily.       clonazePAM (KLONOPIN) 0.5 MG tablet Take 1 tablet (0.5 mg) by mouth 2 times daily as needed for anxiety 30 tablet 0    estradiol-norethindrone (ACTIVELLA) 1-0.5 MG tablet Take 1 tablet by mouth daily 84 tablet 3    famotidine (PEPCID) 10 MG tablet Take 1 tablet daily (Patient taking differently: Take 10 mg by mouth 2 times daily Take 1 tablet daily)      fluticasone (FLOVENT HFA) 44 MCG/ACT inhaler Inhale 1 puff into the lungs 2 times daily 10.6 g 1    HYDROcodone-acetaminophen (NORCO) 5-325 MG tablet Take 1 tablet by mouth every 6 hours as needed      metoprolol succinate ER (TOPROL XL) 25 MG 24 hr tablet Take 1 tablet (25 mg) by mouth daily 30 tablet 11    doxycycline hyclate (VIBRAMYCIN) 100 MG capsule Take 1 capsule by mouth 2 times daily (Patient not taking: Reported on 10/9/2023)      fluconazole (DIFLUCAN) 150 MG tablet TAKE ONE TABLET BY MOUTH AS A  ONE-TIME DOSE REPEAT IN 72 HOURS IF SYMPTOMS PERSIST (Patient not taking: Reported on 10/9/2023)      polyethylene glycol (GOLYTELY) 236 g suspension Take by oral route as directed in colon prep instructions received from [facility] (Patient not taking: Reported on 10/9/2023)      sulfamethoxazole-trimethoprim (BACTRIM DS) 800-160 MG tablet Take 1 tablet by mouth every 12 hours (Patient not taking: Reported on 10/9/2023)        Allergies   Allergen Reactions    Sulfamethoxazole-Trimethoprim Rash    Adhesive [Cyanoacrylate] Unknown     Contact allergy    Aspirin (Tartrazine Only) [Tartrazine] Unknown     Abdominal pain      Bee Venom Unknown    Cefuroxime Axetil [Cefuroxime] Unknown     Rash    Cephalosporins Unknown     Rash--ceftin    Chloral Hydrate Analogues [Chloral Hydrate] Unknown     Contact allergy    Chlorhexidine Gluconate [Chlorhexidine] Unknown     Contact allergy    Clindamycin Unknown     C diff    Codeine Unknown    Dairy [Milk (Cow)] Unknown    Doxepin Unknown     hyper    Ergot Alkaloids [Ergoloid Mesylates] Unknown    Erythromycin Base [Erythromycin] Unknown     Stomach pain    Formaldehyde Hives    Gluten [Gluten Meal] Unknown     Celiac panel not positive, patient states has abdominal pain when she eats gluten    Hydroxyzine Unknown    Latex Unknown    Morphine Unknown     Not effective    Nickel Unknown    Nortriptyline Unknown     hyper    Pregabalin Unknown    Pseudoephedrine Unknown    Sulfamethoxazole     Terconazole Unknown    Tramadol Unknown    Trazodone Nausea and Vomiting    Trimethoprim     Vancomycin Diarrhea    Venlafaxine Unknown    Vistaril [Hydroxyzine] Unknown    Amoxil [Amoxicillin] Rash    Menthyl Valerate Rash    Metronidazole Rash     Rash and neuropathy    Quaternium-15 Rash        Physical Examination Review of Systems   BP (!) 148/74 (BP Location: Left arm, Patient Position: Sitting, Cuff Size: Adult Regular)   Pulse 78   Resp 16   Wt 68 kg (150 lb)   LMP  (LMP Unknown)    SpO2 94%   BMI 28.14 kg/m    Body mass index is 28.14 kg/m .  Wt Readings from Last 3 Encounters:   10/09/23 68 kg (150 lb)   09/28/23 68 kg (149 lb 14.6 oz)   07/21/23 66.4 kg (146 lb 4.8 oz)       General Appearance:   Pleasant  female, appears  stated age. no acute distress, normal body habitus   ENT/Mouth: membranes moist, no apparent gingival bleeding.      EYES:  no scleral icterus, normal conjunctivae   Neck: no carotid bruits. No anterior cervical lymphadenopaty   Respiratory:   lungs are clear to auscultation, no rales or wheezing, equal chest wall expansion    Cardiovascular:   Regular rhythm, normal rate. Normal first and second heart sounds with no murmurs, rubs, or gallops; the carotid, radial and posterior tibial pulses are intact, Jugular venous pressure normal, no edema bilaterally    Abdomen/GI:  no organomegaly, masses, bruits, or tenderness; bowel sounds are present   Extremities: no cyanosis or clubbing   Skin: no xanthelasma, warm.    Heme/lymph/ Immunology No apparent bleeding noted.   Neurologic: Alert and oriented. normal gait, no tremors     Psychiatric: Pleasant, calm, appropriate affect.    A complete 10 system review of systems was performed and is negative except as mentioned in the HPI/subjective.         Past History   Past Medical History:   Past Medical History:   Diagnosis Date    Arthritis     Back pain     Chronic pain     Depressive disorder     Eczema     Fibromyalgia     Hypertension     Mild asthma     Nerve pain     Osteoporosis     Thyroid disease Hyperparathyroid gone from surgery       Past Surgical History:   Past Surgical History:   Procedure Laterality Date    BACK SURGERY      x3    BUNIONECTOMY      OTHER SURGICAL HISTORY N/A 08/16/2016    [place]    RELEASE TRIGGER FINGER      SHOULDER SURGERY      SINUS SURGERY      TONSILLECTOMY         Family History:   Family History   Problem Relation Age of Onset    Osteoporosis Mother     Heart Disease  Father     Hyperlipidemia Father     Anxiety Disorder Father     Substance Abuse Father     Sleep Apnea Sister     Snoring Sister     Heart Disease Sister     No Known Problems Daughter     No Known Problems Maternal Grandmother     No Known Problems Maternal Grandfather     No Known Problems Paternal Grandmother     No Known Problems Paternal Grandfather     No Known Problems Maternal Aunt     No Known Problems Paternal Aunt     Hypertension Brother     Depression Sister     Mental Illness Brother     Hereditary Breast and Ovarian Cancer Syndrome No family hx of     Breast Cancer No family hx of     Cancer No family hx of     Colon Cancer No family hx of     Endometrial Cancer No family hx of     Ovarian Cancer No family hx of         Social History:   Social History     Socioeconomic History    Marital status:      Spouse name: Not on file    Number of children: Not on file    Years of education: Not on file    Highest education level: Not on file   Occupational History    Not on file   Tobacco Use    Smoking status: Never    Smokeless tobacco: Never   Substance and Sexual Activity    Alcohol use: Yes     Alcohol/week: 6.0 standard drinks of alcohol     Comment: Alcoholic Drinks/day: hard cider    Drug use: Not Currently     Types: Marijuana     Comment: Drug use: very rare    Sexual activity: Not Currently   Other Topics Concern    Not on file   Social History Narrative    Not on file     Social Determinants of Health     Financial Resource Strain: High Risk (9/28/2023)    Financial Resource Strain     Within the past 12 months, have you or your family members you live with been unable to get utilities (heat, electricity) when it was really needed?: Yes   Food Insecurity: Low Risk  (9/28/2023)    Food Insecurity     Within the past 12 months, did you worry that your food would run out before you got money to buy more?: No     Within the past 12 months, did the food you bought just not last and you didn t  have money to get more?: No   Transportation Needs: Low Risk  (9/28/2023)    Transportation Needs     Within the past 12 months, has lack of transportation kept you from medical appointments, getting your medicines, non-medical meetings or appointments, work, or from getting things that you need?: No   Physical Activity: Not on file   Stress: Not on file   Social Connections: Not on file   Interpersonal Safety: Low Risk  (9/28/2023)    Interpersonal Safety     Do you feel physically and emotionally safe where you currently live?: Yes     Within the past 12 months, have you been hit, slapped, kicked or otherwise physically hurt by someone?: No     Within the past 12 months, have you been humiliated or emotionally abused in other ways by your partner or ex-partner?: No   Housing Stability: Low Risk  (9/28/2023)    Housing Stability     Do you have housing? : Yes     Are you worried about losing your housing?: No              Lab Results    Chemistry/lipid CBC Cardiac Enzymes/BNP/TSH/INR   Lab Results   Component Value Date    CHOL 183 07/21/2023    HDL 57 07/21/2023     (H) 07/21/2023    TRIG 55 07/21/2023    CR 0.96 (H) 07/21/2023    BUN 11.3 07/21/2023    POTASSIUM 4.3 07/21/2023     07/21/2023    CO2 22 07/21/2023      Lab Results   Component Value Date    WBC 9.1 07/21/2023    HGB 14.0 07/21/2023    HCT 41.8 07/21/2023    MCV 97 07/21/2023     07/21/2023     No results found for: A1C Lab Results   Component Value Date    TSH 1.57 07/21/2023          Dilan Espinoza MD [facility]  Non-Invasive Cardiologist  [facility]  Pager [phone]      Thank you for allowing me to participate in the care of your patient.      Sincerely,     Dilan Espinoza MD   [facility]  cc:   Dilan Espinoza MD  [address] [facility] [address]  [place] [address]

## 2023-10-09 NOTE — PROGRESS NOTES
HEART CARE ENCOUNTER NOTE          Assessment/Recommendations   Assessment:    Preoperative evaluation prior to esophagogastroduodenoscopy and colonoscopy scheduled for next week. She is well-compensated from a cardiac standpoint with no active cardiac symptoms. She is at a low risk of perioperative major adverse cardiac events.  Inappropriate sinus tachycardia. Currently controlled.  Minimal nonobstructive coronary artery disease seen on CT coronary angiography 6/19/2023. She denies angina.  Essential hypertension. Elevated today but she reports adequate control at home.  Hyperlipidemia. Last  mg/dL.  Body mass index is 28.14 kg/m .    Plan:  Continue metoprolol succinate 25 mg daily.  Amlodipine 5 mg daily.  Atorvastatin 10 mg daily.  From a cardiac standpoint, she may proceed with her planned procedure without need for further testing or treatment.  Follow-up with me in 6 months.         History of Present Illness   Ms. Nicole Marcial is a 78 year old female with a significant past history of HTN, nonobstructive CAD and inappropriate sinus tachycardia presenting for follow-up.    She was noting chest pain at her last visit, prompting CT coronary angiography 6/19/2023 which showed minimal nonobstructive CAD. Her chest pain has resolved.     She was on propranolol for her sinus tachycardia but wanted to try switching to ivabradine. This seemed to worsen acid reflux symptoms so she was switched to metoprolol succinate 25 mg daily and tolerating this. Her BP at home is running 120-140s systolic and HR 70-100s.     No chest pain/pressure/tightness, shortness of breath at rest or with exertion, light headedness/dizziness, pre-syncope, syncope, lower extremity swelling, palpitations, paroxysmal nocturnal dyspnea (PND), or orthopnea.    She is scheduled for an EGD/colonoscopy next week.     Cardiac Problems and Cardiac Diagnostics     Most Recent Cardiac testing:  ECG dated 6/7/2023 (personaly reviewed and  interpreted): SR, otherwise normal     Sleep study 6/8/2016 (report reviewed):    Sleep apnea was not seen in this study.    The severity of sleep-disordered breathing can be underestimated during portable test because the apnea/hypopnea index is calculated using total recording time rather than total sleep time. A full night in-lab polysomnography can be considered.    Nasal CPAP is not indicated.    Suggest optimizing sleep hygiene and avoiding any further sleep deprivation.    Recommend evaluation due to periodic leg movements.    Measures aimed at increasing sleep consolidation can be beneficial.     Holter monitor 7/21/2022 (report reviewed):  1. Normal Holter monitor recording. Patient demonstrates a tendency towards sinus tachycardia during the waking hours. Average heart rate 107 bpm. Appropriate nocturnal heart rate slowing is observed. Sources for sinus tachycardia including dehydration, anemia, hypoxia, endocrine abnormality etc. should be considered. Patient may have postural tachycardia syndrome.   2. Patient had multiple symptomatic recordings all of which corresponded to sinus tachycardia.   3. Rare atrial ectopy and no sustained atrial tachyarrhythmia.   4. No ventricular ectopy   5. No profound bradycardia or pauses.     ECHO 7/21/2022 (report reviewed):   There is borderline concentric left ventricular hypertrophy.  The visual ejection fraction is 55-60%.  No regional wall motion abnormalities noted.  No significant valve disease.     Stress test 11/2/2022 (report reviewed):   1. Objective: No electrocardiographic evidence of ischemia, but with reduced sensitivity due to not attaining target heart rate (patient achieved 71% of predicted maximal heart rate for age).  2. Subjective: Patient reported 1/10 chest discomfort with exercise.  3. Average functional capacity for age.    CT coronary angiogram 6/19/2023 (report reviewed):     Minimal nonobstructive atherosclerotic coronary artery disease.      Radiology review for incidental non cardiac findings will be under separate report by the radiologist.     Medications  Allergies   Current Outpatient Medications   Medication Sig Dispense Refill     acyclovir (ZOVIRAX) 5 % external ointment Apply topically 5 times daily 30 g 3     albuterol (PROAIR HFA/PROVENTIL HFA/VENTOLIN HFA) 108 (90 Base) MCG/ACT inhaler Inhale 2 puffs into the lungs every 6 hours as needed for shortness of breath, wheezing or cough 18 g 3     amitriptyline (ELAVIL) 25 MG tablet Take 1 tablet (25 mg) by mouth At Bedtime 180 tablet 3     amLODIPine (NORVASC) 5 MG tablet Take 1 tablet (5 mg) by mouth daily 90 tablet 3     atorvastatin (LIPITOR) 10 MG tablet Take 1 tablet (10 mg) by mouth daily 90 tablet 3     cholecalciferol, vitamin D3, 1,000 unit tablet [CHOLECALCIFEROL, VITAMIN D3, 1,000 UNIT TABLET] Take 2,000 Units by mouth daily.        clonazePAM (KLONOPIN) 0.5 MG tablet Take 1 tablet (0.5 mg) by mouth 2 times daily as needed for anxiety 30 tablet 0     estradiol-norethindrone (ACTIVELLA) 1-0.5 MG tablet Take 1 tablet by mouth daily 84 tablet 3     famotidine (PEPCID) 10 MG tablet Take 1 tablet daily (Patient taking differently: Take 10 mg by mouth 2 times daily Take 1 tablet daily)       fluticasone (FLOVENT HFA) 44 MCG/ACT inhaler Inhale 1 puff into the lungs 2 times daily 10.6 g 1     HYDROcodone-acetaminophen (NORCO) 5-325 MG tablet Take 1 tablet by mouth every 6 hours as needed       metoprolol succinate ER (TOPROL XL) 25 MG 24 hr tablet Take 1 tablet (25 mg) by mouth daily 30 tablet 11     doxycycline hyclate (VIBRAMYCIN) 100 MG capsule Take 1 capsule by mouth 2 times daily (Patient not taking: Reported on 10/9/2023)       fluconazole (DIFLUCAN) 150 MG tablet TAKE ONE TABLET BY MOUTH AS A ONE-TIME DOSE REPEAT IN 72 HOURS IF SYMPTOMS PERSIST (Patient not taking: Reported on 10/9/2023)       polyethylene glycol (GOLYTELY) 236 g suspension Take by oral route as directed in colon prep  instructions received from [facility] (Patient not taking: Reported on 10/9/2023)       sulfamethoxazole-trimethoprim (BACTRIM DS) 800-160 MG tablet Take 1 tablet by mouth every 12 hours (Patient not taking: Reported on 10/9/2023)        Allergies   Allergen Reactions     Sulfamethoxazole-Trimethoprim Rash     Adhesive [Cyanoacrylate] Unknown     Contact allergy     Aspirin (Tartrazine Only) [Tartrazine] Unknown     Abdominal pain       Bee Venom Unknown     Cefuroxime Axetil [Cefuroxime] Unknown     Rash     Cephalosporins Unknown     Rash--ceftin     Chloral Hydrate Analogues [Chloral Hydrate] Unknown     Contact allergy     Chlorhexidine Gluconate [Chlorhexidine] Unknown     Contact allergy     Clindamycin Unknown     C diff     Codeine Unknown     Dairy [Milk (Cow)] Unknown     Doxepin Unknown     hyper     Ergot Alkaloids [Ergoloid Mesylates] Unknown     Erythromycin Base [Erythromycin] Unknown     Stomach pain     Formaldehyde Hives     Gluten [Gluten Meal] Unknown     Celiac panel not positive, patient states has abdominal pain when she eats gluten     Hydroxyzine Unknown     Latex Unknown     Morphine Unknown     Not effective     Nickel Unknown     Nortriptyline Unknown     hyper     Pregabalin Unknown     Pseudoephedrine Unknown     Sulfamethoxazole      Terconazole Unknown     Tramadol Unknown     Trazodone Nausea and Vomiting     Trimethoprim      Vancomycin Diarrhea     Venlafaxine Unknown     Vistaril [Hydroxyzine] Unknown     Amoxil [Amoxicillin] Rash     Menthyl Valerate Rash     Metronidazole Rash     Rash and neuropathy     Quaternium-15 Rash        Physical Examination Review of Systems   BP (!) 148/74 (BP Location: Left arm, Patient Position: Sitting, Cuff Size: Adult Regular)   Pulse 78   Resp 16   Wt 68 kg (150 lb)   LMP  (LMP Unknown)   SpO2 94%   BMI 28.14 kg/m    Body mass index is 28.14 kg/m .  Wt Readings from Last 3 Encounters:   10/09/23 68 kg (150 lb)   09/28/23 68 kg (149 lb 14.6 oz)    07/21/23 66.4 kg (146 lb 4.8 oz)       General Appearance:   Pleasant  female, appears  stated age. no acute distress, normal body habitus   ENT/Mouth: membranes moist, no apparent gingival bleeding.      EYES:  no scleral icterus, normal conjunctivae   Neck: no carotid bruits. No anterior cervical lymphadenopaty   Respiratory:   lungs are clear to auscultation, no rales or wheezing, equal chest wall expansion    Cardiovascular:   Regular rhythm, normal rate. Normal first and second heart sounds with no murmurs, rubs, or gallops; the carotid, radial and posterior tibial pulses are intact, Jugular venous pressure normal, no edema bilaterally    Abdomen/GI:  no organomegaly, masses, bruits, or tenderness; bowel sounds are present   Extremities: no cyanosis or clubbing   Skin: no xanthelasma, warm.    Heme/lymph/ Immunology No apparent bleeding noted.   Neurologic: Alert and oriented. normal gait, no tremors     Psychiatric: Pleasant, calm, appropriate affect.    A complete 10 system review of systems was performed and is negative except as mentioned in the HPI/subjective.         Past History   Past Medical History:   Past Medical History:   Diagnosis Date     Arthritis      Back pain      Chronic pain      Depressive disorder      Eczema      Fibromyalgia      Hypertension      Mild asthma      Nerve pain      Osteoporosis      Thyroid disease Hyperparathyroid gone from surgery       Past Surgical History:   Past Surgical History:   Procedure Laterality Date     BACK SURGERY      x3     BUNIONECTOMY       OTHER SURGICAL HISTORY N/A 08/16/2016    [place]     RELEASE TRIGGER FINGER       SHOULDER SURGERY       SINUS SURGERY       TONSILLECTOMY         Family History:   Family History   Problem Relation Age of Onset     Osteoporosis Mother      Heart Disease Father      Hyperlipidemia Father      Anxiety Disorder Father      Substance Abuse Father      Sleep Apnea Sister      Snoring Sister       Heart Disease Sister      No Known Problems Daughter      No Known Problems Maternal Grandmother      No Known Problems Maternal Grandfather      No Known Problems Paternal Grandmother      No Known Problems Paternal Grandfather      No Known Problems Maternal Aunt      No Known Problems Paternal Aunt      Hypertension Brother      Depression Sister      Mental Illness Brother      Hereditary Breast and Ovarian Cancer Syndrome No family hx of      Breast Cancer No family hx of      Cancer No family hx of      Colon Cancer No family hx of      Endometrial Cancer No family hx of      Ovarian Cancer No family hx of         Social History:   Social History     Socioeconomic History     Marital status:      Spouse name: Not on file     Number of children: Not on file     Years of education: Not on file     Highest education level: Not on file   Occupational History     Not on file   Tobacco Use     Smoking status: Never     Smokeless tobacco: Never   Substance and Sexual Activity     Alcohol use: Yes     Alcohol/week: 6.0 standard drinks of alcohol     Comment: Alcoholic Drinks/day: hard cider     Drug use: Not Currently     Types: Marijuana     Comment: Drug use: very rare     Sexual activity: Not Currently   Other Topics Concern     Not on file   Social History Narrative     Not on file     Social Determinants of Health     Financial Resource Strain: High Risk (9/28/2023)    Financial Resource Strain      Within the past 12 months, have you or your family members you live with been unable to get utilities (heat, electricity) when it was really needed?: Yes   Food Insecurity: Low Risk  (9/28/2023)    Food Insecurity      Within the past 12 months, did you worry that your food would run out before you got money to buy more?: No      Within the past 12 months, did the food you bought just not last and you didn t have money to get more?: No   Transportation Needs: Low Risk  (9/28/2023)    Transportation Needs       Within the past 12 months, has lack of transportation kept you from medical appointments, getting your medicines, non-medical meetings or appointments, work, or from getting things that you need?: No   Physical Activity: Not on file   Stress: Not on file   Social Connections: Not on file   Interpersonal Safety: Low Risk  (9/28/2023)    Interpersonal Safety      Do you feel physically and emotionally safe where you currently live?: Yes      Within the past 12 months, have you been hit, slapped, kicked or otherwise physically hurt by someone?: No      Within the past 12 months, have you been humiliated or emotionally abused in other ways by your partner or ex-partner?: No   Housing Stability: Low Risk  (9/28/2023)    Housing Stability      Do you have housing? : Yes      Are you worried about losing your housing?: No              Lab Results    Chemistry/lipid CBC Cardiac Enzymes/BNP/TSH/INR   Lab Results   Component Value Date    CHOL 183 07/21/2023    HDL 57 07/21/2023     (H) 07/21/2023    TRIG 55 07/21/2023    CR 0.96 (H) 07/21/2023    BUN 11.3 07/21/2023    POTASSIUM 4.3 07/21/2023     07/21/2023    CO2 22 07/21/2023      Lab Results   Component Value Date    WBC 9.1 07/21/2023    HGB 14.0 07/21/2023    HCT 41.8 07/21/2023    MCV 97 07/21/2023     07/21/2023     No results found for: A1C Lab Results   Component Value Date    TSH 1.57 07/21/2023          Dilan Espinoza MD [facility]  Non-Invasive Cardiologist  [facility]  Pager [phone]

## 2023-10-17 ENCOUNTER — TRANSFERRED RECORDS (OUTPATIENT)
Dept: HEALTH INFORMATION MANAGEMENT | Facility: CLINIC | Age: 78
End: 2023-10-17
Payer: COMMERCIAL

## 2023-12-18 ENCOUNTER — TELEPHONE (OUTPATIENT)
Dept: PHARMACY | Facility: CLINIC | Age: 78
End: 2023-12-18
Payer: COMMERCIAL

## 2023-12-18 NOTE — TELEPHONE ENCOUNTER
Called patient to check in on recent blood pressure readings, patient notes that they do not have any recent blood pressure readings to report. Will start to monitor blood pressures and send over some recent readings.     Additionally patient notes that she has not been able to tolerate pantoprazole with no concerns.     Angelina Oconnor, PharmD  Medication Therapy Management Pharmacist   [facility]

## 2024-01-25 ENCOUNTER — VIRTUAL VISIT (OUTPATIENT)
Dept: PHARMACY | Facility: CLINIC | Age: 79
End: 2024-01-25
Payer: COMMERCIAL

## 2024-01-25 DIAGNOSIS — F41.9 ANXIETY: ICD-10-CM

## 2024-01-25 DIAGNOSIS — K21.9 GASTROESOPHAGEAL REFLUX DISEASE WITHOUT ESOPHAGITIS: ICD-10-CM

## 2024-01-25 DIAGNOSIS — Z79.890 NEED FOR PROPHYLACTIC HORMONE REPLACEMENT THERAPY (POSTMENOPAUSAL): ICD-10-CM

## 2024-01-25 DIAGNOSIS — I10 ESSENTIAL HYPERTENSION: Primary | ICD-10-CM

## 2024-01-25 DIAGNOSIS — J45.20 MILD INTERMITTENT ASTHMA WITHOUT COMPLICATION: ICD-10-CM

## 2024-01-25 DIAGNOSIS — G47.00 INSOMNIA, UNSPECIFIED TYPE: ICD-10-CM

## 2024-01-25 DIAGNOSIS — Z78.9 TAKES DIETARY SUPPLEMENTS: ICD-10-CM

## 2024-01-25 DIAGNOSIS — E78.2 MIXED HYPERLIPIDEMIA: ICD-10-CM

## 2024-01-25 DIAGNOSIS — R00.0 TACHYCARDIA: ICD-10-CM

## 2024-01-25 DIAGNOSIS — M54.40 LOW BACK PAIN WITH SCIATICA, SCIATICA LATERALITY UNSPECIFIED, UNSPECIFIED BACK PAIN LATERALITY, UNSPECIFIED CHRONICITY: ICD-10-CM

## 2024-01-25 PROCEDURE — 99207 PR NO CHARGE LOS: CPT | Mod: 93

## 2024-01-25 RX ORDER — PANTOPRAZOLE SODIUM 20 MG/1
20 TABLET, DELAYED RELEASE ORAL DAILY
COMMUNITY

## 2024-01-25 RX ORDER — TIZANIDINE 2 MG/1
1 TABLET ORAL EVERY 8 HOURS PRN
COMMUNITY
Start: 2024-01-12 | End: 2024-05-07

## 2024-01-25 RX ORDER — FAMOTIDINE 20 MG/1
20 TABLET, FILM COATED ORAL DAILY PRN
COMMUNITY
End: 2024-05-07

## 2024-01-25 NOTE — PATIENT INSTRUCTIONS
"Recommendations from today's MTM visit:                                                      Don't take  medication including Norco due for safety concerns.     Use Flovent inhaler 1 puff twice daily every day, rinse mouth after use.     Get updated bone density (DEXA) scan when seeing Dr. Joe romeo.    Follow-up: Return in about 6 months (around 2024).    It was great speaking with you today.  I value your experience and would be very thankful for your time in providing feedback in our clinic survey. In the next few days, you may receive an email or text message from [facility] with a link to a survey related to your  clinical pharmacist.\"     To schedule another MTM appointment, please call the clinic directly or you may call the MTM scheduling line at [phone].    My Clinical Pharmacist's contact information:                                                      Please feel free to contact me with any questions or concerns you have.      Angelina Oconnor, PharmD  Medication Therapy Management Pharmacist   [facility]    "

## 2024-01-25 NOTE — PROGRESS NOTES
Medication Therapy Management (MTM) Encounter    ASSESSMENT:                            Medication Adherence/Access: No issues identified    Hypertension /Tachycardia: Stable, patient has been tolerating metoprolol. Patient reported blood pressures have been at/near goal of <140/90. Clinic pulse readings have also been controlled.     Hyperlipidemia: Stable, most recent LDL at/near goal of <100.    Asthma: Recommend patient increase use of Flovent to use on a daily basis to help with current symptoms. Can continue to use albuterol as needed.     GERD: Stable, symptoms have improved with addition of pantoprazole therapy.     Pain: Educated on side effects of tizanidine, appropriate for patient to continue to take at night since this has been effective. Recommend patient not use  Norco due to safety concerns and to properly dispose of this medication.     Insomnia/Anxiety: Stable, appears that clonazepam use is minimal to none, educated on side effects/long term risks. Discussed alternative therapy for anxiety management, options included sertraline or fluoxetine however patient declined at this time and would prefer to continue on amitriptyline.     Menopause: Stable.    Supplements: Would be due for an updated DEXA scan, recent results do not indicate for prevention therapy based on FRAX score. Did not have time to assess calcium intake today through diet, will do so at follow up.     PLAN:                            Don't take  medication including Norco due for safety concerns.     Use Flovent inhaler 1 puff twice daily every day, rinse mouth after use.     Get updated bone density (DEXA) scan when seeing Dr. Joe romeo.     Follow-up: Return in about 6 months (around 2024).    SUBJECTIVE/OBJECTIVE:                          Nicole Marcial is a 78 year old female called for a follow-up visit from 8/3/2023.       Reason for visit: Routine follow up.    Allergies/ADRs: Reviewed in chart  Past  Medical History: Reviewed in chart  Tobacco: She reports that she has never smoked. She has never used smokeless tobacco.  Alcohol: 1-3 beverages / week    Medication Adherence/Access: no issues reported    Hypertension /Tachycardia:  Metoprolol succiante 25mg daily  Amlodipine 5mg daily   Patient reports no current medication side effects  Patient self monitors blood pressure.  Home BP monitoring usually 130's/70's but can vary, notes that the highest seen is usually around 140's/80's .       BP Readings from Last 3 Encounters:   10/09/23 (!) 148/74   09/28/23 134/74   09/12/23 (!) 145/86     Pulse Readings from Last 3 Encounters:   10/09/23 78   09/28/23 75   09/12/23 103     Hyperlipidemia   atorvastatin 10mg daily  Patient reports no significant myalgias or other side effects.     Recent Labs   Lab Test 07/21/23  1030 09/28/22  0954   CHOL 183 219*   HDL 57 71   * 132*   TRIG 55 78     Asthma:   ICS - Flovent HFA 44 mcg - 1 puffs twice daily- patient notes that they use only when symptoms have worsened, right now notes that symptoms have worsened but is not using Flovent on a regular basis.    Albuterol (ProAir/Ventolin/Proventil) inhaler as needed - patient reports no recent use.   Triggers include: humidity.    GERD:   Famotidine 20 mg daily as needed  - patient notes using 3 or 4 times in the last couple months but has needed to use less since starting pantoprazole.   Pantoprazole 20mg daily - patient notes that this has been very effective for heartburn.   Patient reports no current symptoms.     Pain:  Recently prescribed tizanidine 2mg three times daily as needed - patient notes that since she has started taking, it has been helpful for pain and usually takes nightly before bed.   Has Norco on hand from previous surgeries for which she notes she may take on a rare occasion maybe once every 90 days.     Insomnia/Anxiety:   Amitriptyline 25mg daily at bedtime, patient notes that she does not take on  nights that she takes tizanidine  Clonazepam 0.5mg twice daily as needed - patient reports no recent use, per dispense report has filled only once in July. Patient reports no recent or regular use and specifically has not used for the past 3 months.   Notes that anxiety is controlled at this time on current regimen of tizanidine and amitriptyline.     Menopause:   Estradiol-norethindrone 1-0.5mg daily - patient notes that this is effective to prevent night time sweating.     Supplements:   Biotin 5000 mcg daily to help with hair thinning   Vitamin D 2000 international unit(s) daily   DEXA history 7/23/2021:   T-SCORES:  Lumbar Spine L1-L2 T-score: -2.3  Left Hip (Neck) T-score: -1.1  Right Hip (Neck) T-score: -1.4  PERCENT CHANGE since 2018:  Lumbar Spine: Decreased significantly by 5.7%  Femurs: Not significantly changed, +1.9%  Radius: Not significantly changed. +0.4%.  FRAX 10-YEAR PROBABILITY OF FRACTURE*:  Major Osteoporotic: 13.3%  Hip: 2.7%    Today's Vitals: LMP  (LMP Unknown)   ----------------  I spent 32 minutes with this patient today. All changes were made via collaborative practice agreement with Shreya Diaz MD. A copy of the visit note was provided to the patient's provider(s).    A summary of these recommendations was sent via [employer].    Suleman KohlerD  Medication Therapy Management Pharmacist   [facility]     Telemedicine Visit Details  Type of service:  Telephone visit  Start Time:  1:38 PM  End Time: 2:10 PM     Medication Therapy Recommendations  Lumbago    Current Medication: HYDROcodone-acetaminophen (NORCO) 5-325 MG tablet (Discontinued)   Rationale: Unsafe medication for the patient - Adverse medication event - Safety   Recommendation: Discontinue Medication   Status: Patient Agreed - Adherence/Education         Mild intermittent asthma without complication    Current Medication: fluticasone (FLOVENT HFA) 44 MCG/ACT inhaler   Rationale:  Dose too low - Dosage too low - Effectiveness   Recommendation: Increase Frequency   Status: Patient Agreed - Adherence/Education

## 2024-02-24 ENCOUNTER — HEALTH MAINTENANCE LETTER (OUTPATIENT)
Age: 79
End: 2024-02-24

## 2024-05-02 DIAGNOSIS — Z79.890 CURRENT LONG-TERM USE OF POSTMENOPAUSAL HORMONE REPLACEMENT THERAPY: ICD-10-CM

## 2024-05-02 NOTE — TELEPHONE ENCOUNTER
Pending Prescriptions:                       Disp   Refills    estradiol-norethindrone (ACTIVELLA) 1-0.5*84 tab*3            Sig: Take 1 tablet by mouth daily       IUGR (intrauterine growth retardation) of  IUGR (intrauterine growth retardation) of  IUGR (intrauterine growth retardation) of  IUGR (intrauterine growth retardation) of  IUGR (intrauterine growth retardation) of  IUGR (intrauterine growth retardation) of  IUGR (intrauterine growth retardation) of  IUGR (intrauterine growth retardation) of  IUGR (intrauterine growth retardation) of  IUGR (intrauterine growth retardation) of  IUGR (intrauterine growth retardation) of  IUGR (intrauterine growth retardation) of  IUGR (intrauterine growth retardation) of  IUGR (intrauterine growth retardation) of

## 2024-05-03 RX ORDER — ESTRADIOL AND NORETHINDRONE ACETATE 1; .5 MG/1; MG/1
1 TABLET ORAL DAILY
Qty: 84 TABLET | Refills: 3 | Status: SHIPPED | OUTPATIENT
Start: 2024-05-03 | End: 2024-05-08

## 2024-05-06 SDOH — HEALTH STABILITY: PHYSICAL HEALTH: ON AVERAGE, HOW MANY DAYS PER WEEK DO YOU ENGAGE IN MODERATE TO STRENUOUS EXERCISE (LIKE A BRISK WALK)?: 7 DAYS

## 2024-05-06 SDOH — HEALTH STABILITY: PHYSICAL HEALTH: ON AVERAGE, HOW MANY MINUTES DO YOU ENGAGE IN EXERCISE AT THIS LEVEL?: 30 MIN

## 2024-05-06 ASSESSMENT — ASTHMA QUESTIONNAIRES
QUESTION_1 LAST FOUR WEEKS HOW MUCH OF THE TIME DID YOUR ASTHMA KEEP YOU FROM GETTING AS MUCH DONE AT WORK, SCHOOL OR AT HOME: NONE OF THE TIME
QUESTION_4 LAST FOUR WEEKS HOW OFTEN HAVE YOU USED YOUR RESCUE INHALER OR NEBULIZER MEDICATION (SUCH AS ALBUTEROL): ONCE A WEEK OR LESS
QUESTION_3 LAST FOUR WEEKS HOW OFTEN DID YOUR ASTHMA SYMPTOMS (WHEEZING, COUGHING, SHORTNESS OF BREATH, CHEST TIGHTNESS OR PAIN) WAKE YOU UP AT NIGHT OR EARLIER THAN USUAL IN THE MORNING: ONCE A WEEK
QUESTION_2 LAST FOUR WEEKS HOW OFTEN HAVE YOU HAD SHORTNESS OF BREATH: ONCE OR TWICE A WEEK
QUESTION_5 LAST FOUR WEEKS HOW WOULD YOU RATE YOUR ASTHMA CONTROL: WELL CONTROLLED
ACT_TOTALSCORE: 20
ACT_TOTALSCORE: 20

## 2024-05-06 ASSESSMENT — SOCIAL DETERMINANTS OF HEALTH (SDOH): HOW OFTEN DO YOU GET TOGETHER WITH FRIENDS OR RELATIVES?: MORE THAN THREE TIMES A WEEK

## 2024-05-06 NOTE — PROGRESS NOTES
HEART CARE ENCOUNTER NOTE      [facility]  [phone]      Assessment/Recommendations   Assessment:   Coronary artery disease: Minimal nonobstructive as seen on CT coronary angiogram 6/19/2023.  Patient denies any anginal symptoms.  Essential hypertension: On metoprolol 25 mg daily, amlodipine 5 mg daily.  Slightly elevated in clinic at 138/80 today.  Patient has been monitoring for the past week and is typically in the 110s to 120s systolic.  Hyperlipidemia: On atorvastatin 10 mg daily.  Last lipids 7/21/2023 showed LDL of 115.  Will recheck cholesterol at PCP appointment tomorrow and could consider increase of atorvastatin if LDL above 100.  Inappropriate sinus tachycardia: On metoprolol 25 mg daily.  Patient notes a slight increase in the flutter/racing heart sensation in the past year.  Feels that a few times a week but notes it is less than a minute.  Still happening at rest.  Will increase metoprolol to 50 mg daily given a pulse of 100 today in clinic.    Plan:   Increase metoprolol to 50 mg daily.  If patient notes lightheadedness could split the dosing to 25 in the morning and 25 in the evening.  Patient will reach out if not tolerating  Continue other current medications  Fasting cholesterol check at appointment with PCP tomorrow.  Could consider increasing atorvastatin if LDL still elevated      Follow up in 1 year with Dr. Espinoza.       History of Present Illness/Subjective    HPI: Nicole Marcial is a 79 year old female with PMHx of hypertension, nonobstructive CAD, hyperlipidemia, inappropriate sinus tachycardia presents for follow-up.  Patient last saw Dr. Espinoza 10/9/2023 for preop evaluation prior to EGD and colonoscopy.  Patient was felt to be low risk and could proceed with her procedure.  Patient denied any recurrence of the chest pain she had prior to the CT coronary angiogram.  Patient had been tolerating metoprolol for her sinus tachycardia with heart rates ranging 70 to  100 bpm.    Patient notes that she has been feeling good for the past year.  Does note a slight increase in the fluttering/racing heart sensation that occurs at rest.  Notes it occurs a few times a week but lasts less than a minute.  Patient denies shortness of breath or chest discomfort with that.  Patient goes for a walk every morning and is going to start going back to [email or web address] classes and riding her bike since she was back from [place].  Patient denies any exertional angina or dyspnea.  Patient notes in [place] she had a little bit of lower extremity edema but this is improved.  Denies lightheadedness, orthopnea.        Sleep study 6/8/2016    Sleep apnea was not seen in this study.   The severity of sleep-disordered breathing can be underestimated during portable test because the apnea/hypopnea index is calculated using total recording time rather than total sleep time. A full night in-lab polysomnography can be considered.   Nasal CPAP is not indicated.   Suggest optimizing sleep hygiene and avoiding any further sleep deprivation.   Recommend evaluation due to periodic leg movements.   Measures aimed at increasing sleep consolidation can be beneficial.     Holter monitor 7/21/2022  1. Normal Holter monitor recording. Patient demonstrates a tendency towards sinus tachycardia during the waking hours. Average heart rate 107 bpm. Appropriate nocturnal heart rate slowing is observed. Sources for sinus tachycardia including dehydration, anemia, hypoxia, endocrine abnormality etc. should be considered. Patient may have postural tachycardia syndrome.   2. Patient had multiple symptomatic recordings all of which corresponded to sinus tachycardia.   3. Rare atrial ectopy and no sustained atrial tachyarrhythmia.   4. No ventricular ectopy   5. No profound bradycardia or pauses.     ECHO 7/21/2022  There is borderline concentric left ventricular hypertrophy.  The visual ejection fraction is 55-60%.  No regional wall motion  abnormalities noted.  No significant valve disease.     Stress test 11/2/2022   1. Objective: No electrocardiographic evidence of ischemia, but with reduced sensitivity due to not attaining target heart rate (patient achieved 71% of predicted maximal heart rate for age).  2. Subjective: Patient reported 1/10 chest discomfort with exercise.  3. Average functional capacity for age.     CT coronary angiogram 6/19/2023     Minimal nonobstructive atherosclerotic coronary artery disease.    Radiology review for incidental non cardiac findings will be under separate report by the radiologist.       Physical Examination  Review of Systems   Vitals: /80 (BP Location: Right arm, Patient Position: Sitting, Cuff Size: Adult Regular)   Pulse 100   Resp 20   Wt 69.5 kg (153 lb 3.2 oz)   LMP  (LMP Unknown)   SpO2 (!) 86%   BMI 28.74 kg/m    BMI= Body mass index is 28.74 kg/m .  Wt Readings from Last 3 Encounters:   05/07/24 69.5 kg (153 lb 3.2 oz)   10/09/23 68 kg (150 lb)   09/28/23 68 kg (149 lb 14.6 oz)           ENT/Mouth: membranes moist, no oral lesions or bleeding gums.      EYES:  no scleral icterus, normal conjunctivae       Chest/Lungs:   lungs are clear to auscultation, no rales or wheezing,  equal chest wall expansion    Cardiovascular:   Regular. Normal first and second heart sounds with no murmurs, rubs, or gallops; the radial and posterior tibial pulses are intact,  no edema bilaterally        Extremities: no cyanosis or clubbing   Skin: no xanthelasma, warm.    Neurologic: no tremors     Psychiatric: alert and oriented x3, calm        Please refer above for cardiac ROS details.        Medical History  Surgical History Family History Social History   Past Medical History:   Diagnosis Date    Arthritis     Back pain     Chronic pain     Depressive disorder     Eczema     Fibromyalgia     Hypertension     Mild asthma     Nerve pain     Osteoporosis     Thyroid disease Hyperparathyroid gone from surgery      Past Surgical History:   Procedure Laterality Date    BACK SURGERY      x3    BUNIONECTOMY      OTHER SURGICAL HISTORY N/A 08/16/2016    parathyroidectomyDr. Walt    RELEASE TRIGGER FINGER      SHOULDER SURGERY      SINUS SURGERY      TONSILLECTOMY       Family History   Problem Relation Age of Onset    Osteoporosis Mother     Heart Disease Father     Hyperlipidemia Father     Anxiety Disorder Father     Substance Abuse Father     Sleep Apnea Sister     Snoring Sister     Heart Disease Sister     No Known Problems Daughter     No Known Problems Maternal Grandmother     No Known Problems Maternal Grandfather     No Known Problems Paternal Grandmother     No Known Problems Paternal Grandfather     No Known Problems Maternal Aunt     No Known Problems Paternal Aunt     Hypertension Brother     Depression Sister     Mental Illness Brother     Hereditary Breast and Ovarian Cancer Syndrome No family hx of     Breast Cancer No family hx of     Cancer No family hx of     Colon Cancer No family hx of     Endometrial Cancer No family hx of     Ovarian Cancer No family hx of         Social History     Socioeconomic History    Marital status:      Spouse name: Not on file    Number of children: Not on file    Years of education: Not on file    Highest education level: Not on file   Occupational History    Not on file   Tobacco Use    Smoking status: Never    Smokeless tobacco: Never   Substance and Sexual Activity    Alcohol use: Yes     Alcohol/week: 6.0 standard drinks of alcohol     Comment: Alcoholic Drinks/day: hard cider    Drug use: Not Currently     Types: Marijuana     Comment: Drug use: very rare    Sexual activity: Not Currently   Other Topics Concern    Not on file   Social History Narrative    Not on file     Social Determinants of Health     Financial Resource Strain: High Risk (9/28/2023)    Financial Resource Strain     Within the past 12 months, have you or your family members you live with been unable  to get utilities (heat, electricity) when it was really needed?: Yes   Food Insecurity: Low Risk  (9/28/2023)    Food Insecurity     Within the past 12 months, did you worry that your food would run out before you got money to buy more?: No     Within the past 12 months, did the food you bought just not last and you didn t have money to get more?: No   Transportation Needs: Low Risk  (9/28/2023)    Transportation Needs     Within the past 12 months, has lack of transportation kept you from medical appointments, getting your medicines, non-medical meetings or appointments, work, or from getting things that you need?: No   Physical Activity: Not on file   Stress: Not on file   Social Connections: Not on file   Interpersonal Safety: Low Risk  (9/28/2023)    Interpersonal Safety     Do you feel physically and emotionally safe where you currently live?: Yes     Within the past 12 months, have you been hit, slapped, kicked or otherwise physically hurt by someone?: No     Within the past 12 months, have you been humiliated or emotionally abused in other ways by your partner or ex-partner?: No   Housing Stability: Low Risk  (9/28/2023)    Housing Stability     Do you have housing? : Yes     Are you worried about losing your housing?: No           Medications  Allergies   Current Outpatient Medications   Medication Sig Dispense Refill    acyclovir (ZOVIRAX) 5 % external ointment Apply topically 5 times daily (Patient not taking: Reported on 1/25/2024) 30 g 3    albuterol (PROAIR HFA/PROVENTIL HFA/VENTOLIN HFA) 108 (90 Base) MCG/ACT inhaler Inhale 2 puffs into the lungs every 6 hours as needed for shortness of breath, wheezing or cough (Patient not taking: Reported on 1/25/2024) 18 g 3    amitriptyline (ELAVIL) 25 MG tablet Take 1 tablet (25 mg) by mouth At Bedtime 180 tablet 3    amLODIPine (NORVASC) 5 MG tablet Take 1 tablet (5 mg) by mouth daily 90 tablet 3    atorvastatin (LIPITOR) 10 MG tablet Take 1 tablet (10 mg) by  mouth daily 90 tablet 3    Biotin 5000 MCG CAPS Take 1 tablet by mouth daily      cholecalciferol, vitamin D3, 1,000 unit tablet [CHOLECALCIFEROL, VITAMIN D3, 1,000 UNIT TABLET] Take 2,000 Units by mouth daily.       clonazePAM (KLONOPIN) 0.5 MG tablet Take 1 tablet (0.5 mg) by mouth 2 times daily as needed for anxiety (Patient not taking: Reported on 1/25/2024) 30 tablet 0    estradiol-norethindrone (ACTIVELLA) 1-0.5 MG tablet Take 1 tablet by mouth daily 84 tablet 3    famotidine (PEPCID) 20 MG tablet Take 20 mg by mouth daily as needed (heartburn)      fluticasone (FLOVENT HFA) 44 MCG/ACT inhaler Inhale 1 puff into the lungs 2 times daily 10.6 g 1    metoprolol succinate ER (TOPROL XL) 25 MG 24 hr tablet Take 1 tablet (25 mg) by mouth daily 30 tablet 11    pantoprazole (PROTONIX) 20 MG EC tablet Take 20 mg by mouth daily      tiZANidine (ZANAFLEX) 2 MG tablet Take 1 tablet by mouth every 8 hours as needed for muscle spasms         Allergies   Allergen Reactions    Sulfamethoxazole-Trimethoprim Rash    Adhesive [Cyanoacrylate] Unknown     Contact allergy    Aspirin (Tartrazine Only) [Tartrazine] Unknown     Abdominal pain      Bee Venom Unknown    Cefuroxime Axetil [Cefuroxime] Unknown     Rash    Cephalosporins Unknown     Rash--ceftin    Chloral Hydrate Analogues [Chloral Hydrate] Unknown     Contact allergy    Chlorhexidine Gluconate [Chlorhexidine] Unknown     Contact allergy    Clindamycin Unknown     C diff    Codeine Unknown    Dairy [Milk (Cow)] Unknown    Doxepin Unknown     hyper    Ergot Alkaloids [Ergoloid Mesylates] Unknown    Erythromycin Base [Erythromycin] Unknown     Stomach pain    Formaldehyde Hives    Gluten [Gluten Meal] Unknown     Celiac panel not positive, patient states has abdominal pain when she eats gluten    Hydroxyzine Unknown    Latex Unknown    Morphine Unknown     Not effective    Nickel Unknown    Nortriptyline Unknown     hyper    Pregabalin Unknown    Pseudoephedrine Unknown     "Sulfamethoxazole     Terconazole Unknown    Tramadol Unknown    Trazodone Nausea and Vomiting    Trimethoprim     Vancomycin Diarrhea    Venlafaxine Unknown    Vistaril [Hydroxyzine] Unknown    Amoxil [Amoxicillin] Rash    Menthyl Valerate Rash    Metronidazole Rash     Rash and neuropathy    Quaternium-15 Rash          Lab Results    Chemistry/lipid CBC Cardiac Enzymes/BNP/TSH/INR   Recent Labs   Lab Test 07/21/23  1030   CHOL 183   HDL 57   *   TRIG 55     Recent Labs   Lab Test 07/21/23  1030 09/28/22  0954 05/09/22  0915   * 132* 86     Recent Labs   Lab Test 07/21/23  1030      POTASSIUM 4.3   CHLORIDE 106   CO2 22   *   BUN 11.3   CR 0.96*   GFRESTIMATED 61   DOMITILA 9.2     Recent Labs   Lab Test 07/21/23  1030 06/19/23  1013 09/28/22  0954   CR 0.96* 1.1 1.04*     No results for input(s): \"A1C\" in the last 02060 hours.       Recent Labs   Lab Test 07/21/23  1030   WBC 9.1   HGB 14.0   HCT 41.8   MCV 97        Recent Labs   Lab Test 07/21/23  1030 09/28/22  0954 05/09/22  0915   HGB 14.0 14.9 13.5    No results for input(s): \"TROPONINI\" in the last 77865 hours.  No results for input(s): \"BNP\", \"NTBNPI\", \"NTBNP\" in the last 18005 hours.  Recent Labs   Lab Test 07/21/23  1030   TSH 1.57     No results for input(s): \"INR\" in the last 17376 hours.       Eleanor Coyle PA-C                                       "

## 2024-05-07 ENCOUNTER — OFFICE VISIT (OUTPATIENT)
Dept: CARDIOLOGY | Facility: CLINIC | Age: 79
End: 2024-05-07
Attending: GENERAL ACUTE CARE HOSPITAL
Payer: COMMERCIAL

## 2024-05-07 VITALS
WEIGHT: 153.2 LBS | SYSTOLIC BLOOD PRESSURE: 138 MMHG | HEART RATE: 100 BPM | RESPIRATION RATE: 20 BRPM | OXYGEN SATURATION: 86 % | DIASTOLIC BLOOD PRESSURE: 80 MMHG | BODY MASS INDEX: 28.74 KG/M2

## 2024-05-07 DIAGNOSIS — E78.5 HYPERLIPIDEMIA, UNSPECIFIED HYPERLIPIDEMIA TYPE: ICD-10-CM

## 2024-05-07 DIAGNOSIS — I10 ESSENTIAL HYPERTENSION: ICD-10-CM

## 2024-05-07 DIAGNOSIS — I47.11 INAPPROPRIATE SINUS TACHYCARDIA (H): Primary | ICD-10-CM

## 2024-05-07 PROBLEM — J45.20 MILD INTERMITTENT ASTHMA WITHOUT COMPLICATION: Status: RESOLVED | Noted: 2020-08-13 | Resolved: 2024-05-07

## 2024-05-07 PROBLEM — N18.31 STAGE 3A CHRONIC KIDNEY DISEASE (H): Status: ACTIVE | Noted: 2021-05-10

## 2024-05-07 PROBLEM — R10.9 NONSPECIFIC ABDOMINAL PAIN: Status: RESOLVED | Noted: 2017-04-03 | Resolved: 2024-05-07

## 2024-05-07 PROBLEM — K57.30 DIVERTICULAR DISEASE OF LARGE INTESTINE: Status: RESOLVED | Noted: 2017-04-03 | Resolved: 2024-05-07

## 2024-05-07 PROBLEM — K21.00 GASTRO-ESOPHAGEAL REFLUX DISEASE WITH ESOPHAGITIS: Status: RESOLVED | Noted: 2018-12-18 | Resolved: 2024-05-07

## 2024-05-07 PROCEDURE — 99214 OFFICE O/P EST MOD 30 MIN: CPT | Performed by: STUDENT IN AN ORGANIZED HEALTH CARE EDUCATION/TRAINING PROGRAM

## 2024-05-07 NOTE — LETTER
5/7/2024    Shreya Diaz MD  [address]  [facility] [address]    RE: Nicole Marcial       Dear Colleague,     I had the pleasure of seeing Nicole Marcial in the [facility].    HEART CARE ENCOUNTER NOTE      [facility]  [phone]      Assessment/Recommendations   Assessment:   Coronary artery disease: Minimal nonobstructive as seen on CT coronary angiogram 6/19/2023.  Patient denies any anginal symptoms.  Essential hypertension: On metoprolol 25 mg daily, amlodipine 5 mg daily.  Slightly elevated in clinic at 138/80 today.  Patient has been monitoring for the past week and is typically in the 110s to 120s systolic.  Hyperlipidemia: On atorvastatin 10 mg daily.  Last lipids 7/21/2023 showed LDL of 115.  Will recheck cholesterol at PCP appointment tomorrow and could consider increase of atorvastatin if LDL above 100.  Inappropriate sinus tachycardia: On metoprolol 25 mg daily.  Patient notes a slight increase in the flutter/racing heart sensation in the past year.  Feels that a few times a week but notes it is less than a minute.  Still happening at rest.  Will increase metoprolol to 50 mg daily given a pulse of 100 today in clinic.    Plan:   Increase metoprolol to 50 mg daily.  If patient notes lightheadedness could split the dosing to 25 in the morning and 25 in the evening.  Patient will reach out if not tolerating  Continue other current medications  Fasting cholesterol check at appointment with PCP tomorrow.  Could consider increasing atorvastatin if LDL still elevated      Follow up in 1 year with Dr. Espinoza.       History of Present Illness/Subjective    HPI: Nicole Marcial is a 79 year old female with PMHx of hypertension, nonobstructive CAD, hyperlipidemia, inappropriate sinus tachycardia presents for follow-up.  Patient last saw Dr. Espinoza 10/9/2023 for preop evaluation prior to EGD and colonoscopy.  Patient was felt to be low risk and could  proceed with her procedure.  Patient denied any recurrence of the chest pain she had prior to the CT coronary angiogram.  Patient had been tolerating metoprolol for her sinus tachycardia with heart rates ranging 70 to 100 bpm.    Patient notes that she has been feeling good for the past year.  Does note a slight increase in the fluttering/racing heart sensation that occurs at rest.  Notes it occurs a few times a week but lasts less than a minute.  Patient denies shortness of breath or chest discomfort with that.  Patient goes for a walk every morning and is going to start going back to [employer] classes and riding her bike since she was back from [place].  Patient denies any exertional angina or dyspnea.  Patient notes in [place] she had a little bit of lower extremity edema but this is improved.  Denies lightheadedness, orthopnea.        Sleep study 6/8/2016    Sleep apnea was not seen in this study.   The severity of sleep-disordered breathing can be underestimated during portable test because the apnea/hypopnea index is calculated using total recording time rather than total sleep time. A full night in-lab polysomnography can be considered.   Nasal CPAP is not indicated.   Suggest optimizing sleep hygiene and avoiding any further sleep deprivation.   Recommend evaluation due to periodic leg movements.   Measures aimed at increasing sleep consolidation can be beneficial.     Holter monitor 7/21/2022  1. Normal Holter monitor recording. Patient demonstrates a tendency towards sinus tachycardia during the waking hours. Average heart rate 107 bpm. Appropriate nocturnal heart rate slowing is observed. Sources for sinus tachycardia including dehydration, anemia, hypoxia, endocrine abnormality etc. should be considered. Patient may have postural tachycardia syndrome.   2. Patient had multiple symptomatic recordings all of which corresponded to sinus tachycardia.   3. Rare atrial ectopy and no sustained atrial  tachyarrhythmia.   4. No ventricular ectopy   5. No profound bradycardia or pauses.     ECHO 7/21/2022  There is borderline concentric left ventricular hypertrophy.  The visual ejection fraction is 55-60%.  No regional wall motion abnormalities noted.  No significant valve disease.     Stress test 11/2/2022   1. Objective: No electrocardiographic evidence of ischemia, but with reduced sensitivity due to not attaining target heart rate (patient achieved 71% of predicted maximal heart rate for age).  2. Subjective: Patient reported 1/10 chest discomfort with exercise.  3. Average functional capacity for age.     CT coronary angiogram 6/19/2023     Minimal nonobstructive atherosclerotic coronary artery disease.    Radiology review for incidental non cardiac findings will be under separate report by the radiologist.       Physical Examination  Review of Systems   Vitals: /80 (BP Location: Right arm, Patient Position: Sitting, Cuff Size: Adult Regular)   Pulse 100   Resp 20   Wt 69.5 kg (153 lb 3.2 oz)   LMP  (LMP Unknown)   SpO2 (!) 86%   BMI 28.74 kg/m    BMI= Body mass index is 28.74 kg/m .  Wt Readings from Last 3 Encounters:   05/07/24 69.5 kg (153 lb 3.2 oz)   10/09/23 68 kg (150 lb)   09/28/23 68 kg (149 lb 14.6 oz)           ENT/Mouth: membranes moist, no oral lesions or bleeding gums.      EYES:  no scleral icterus, normal conjunctivae       Chest/Lungs:   lungs are clear to auscultation, no rales or wheezing,  equal chest wall expansion    Cardiovascular:   Regular. Normal first and second heart sounds with no murmurs, rubs, or gallops; the radial and posterior tibial pulses are intact,  no edema bilaterally        Extremities: no cyanosis or clubbing   Skin: no xanthelasma, warm.    Neurologic: no tremors     Psychiatric: alert and oriented x3, calm        Please refer above for cardiac ROS details.        Medical History  Surgical History Family History Social History   Past Medical History:    Diagnosis Date    Arthritis     Back pain     Chronic pain     Depressive disorder     Eczema     Fibromyalgia     Hypertension     Mild asthma     Nerve pain     Osteoporosis     Thyroid disease 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 Walt    RELEASE TRIGGER FINGER      SHOULDER SURGERY      SINUS SURGERY      TONSILLECTOMY       Family History   Problem Relation Age of Onset    Osteoporosis Mother     Heart Disease Father     Hyperlipidemia Father     Anxiety Disorder Father     Substance Abuse Father     Sleep Apnea Sister     Snoring Sister     Heart Disease Sister     No Known Problems Daughter     No Known Problems Maternal Grandmother     No Known Problems Maternal Grandfather     No Known Problems Paternal Grandmother     No Known Problems Paternal Grandfather     No Known Problems Maternal Aunt     No Known Problems Paternal Aunt     Hypertension Brother     Depression Sister     Mental Illness Brother     Hereditary Breast and Ovarian Cancer Syndrome No family hx of     Breast Cancer No family hx of     Cancer No family hx of     Colon Cancer No family hx of     Endometrial Cancer No family hx of     Ovarian Cancer No family hx of         Social History     Socioeconomic History    Marital status:      Spouse name: Not on file    Number of children: Not on file    Years of education: Not on file    Highest education level: Not on file   Occupational History    Not on file   Tobacco Use    Smoking status: Never    Smokeless tobacco: Never   Substance and Sexual Activity    Alcohol use: Yes     Alcohol/week: 6.0 standard drinks of alcohol     Comment: Alcoholic Drinks/day: hard cider    Drug use: Not Currently     Types: Marijuana     Comment: Drug use: very rare    Sexual activity: Not Currently   Other Topics Concern    Not on file   Social  History Narrative    Not on file     Social Determinants of Health     Financial Resource Strain: High Risk (9/28/2023)    Financial Resource Strain     Within the past 12 months, have you or your family members you live with been unable to get utilities (heat, electricity) when it was really needed?: Yes   Food Insecurity: Low Risk  (9/28/2023)    Food Insecurity     Within the past 12 months, did you worry that your food would run out before you got money to buy more?: No     Within the past 12 months, did the food you bought just not last and you didn t have money to get more?: No   Transportation Needs: Low Risk  (9/28/2023)    Transportation Needs     Within the past 12 months, has lack of transportation kept you from medical appointments, getting your medicines, non-medical meetings or appointments, work, or from getting things that you need?: No   Physical Activity: Not on file   Stress: Not on file   Social Connections: Not on file   Interpersonal Safety: Low Risk  (9/28/2023)    Interpersonal Safety     Do you feel physically and emotionally safe where you currently live?: Yes     Within the past 12 months, have you been hit, slapped, kicked or otherwise physically hurt by someone?: No     Within the past 12 months, have you been humiliated or emotionally abused in other ways by your partner or ex-partner?: No   Housing Stability: Low Risk  (9/28/2023)    Housing Stability     Do you have housing? : Yes     Are you worried about losing your housing?: No           Medications  Allergies   Current Outpatient Medications   Medication Sig Dispense Refill    acyclovir (ZOVIRAX) 5 % external ointment Apply topically 5 times daily (Patient not taking: Reported on 1/25/2024) 30 g 3    albuterol (PROAIR HFA/PROVENTIL HFA/VENTOLIN HFA) 108 (90 Base) MCG/ACT inhaler Inhale 2 puffs into the lungs every 6 hours as needed for shortness of breath, wheezing or cough (Patient not taking: Reported on 1/25/2024) 18 g 3     amitriptyline (ELAVIL) 25 MG tablet Take 1 tablet (25 mg) by mouth At Bedtime 180 tablet 3    amLODIPine (NORVASC) 5 MG tablet Take 1 tablet (5 mg) by mouth daily 90 tablet 3    atorvastatin (LIPITOR) 10 MG tablet Take 1 tablet (10 mg) by mouth daily 90 tablet 3    Biotin 5000 MCG CAPS Take 1 tablet by mouth daily      cholecalciferol, vitamin D3, 1,000 unit tablet [CHOLECALCIFEROL, VITAMIN D3, 1,000 UNIT TABLET] Take 2,000 Units by mouth daily.       clonazePAM (KLONOPIN) 0.5 MG tablet Take 1 tablet (0.5 mg) by mouth 2 times daily as needed for anxiety (Patient not taking: Reported on 1/25/2024) 30 tablet 0    estradiol-norethindrone (ACTIVELLA) 1-0.5 MG tablet Take 1 tablet by mouth daily 84 tablet 3    famotidine (PEPCID) 20 MG tablet Take 20 mg by mouth daily as needed (heartburn)      fluticasone (FLOVENT HFA) 44 MCG/ACT inhaler Inhale 1 puff into the lungs 2 times daily 10.6 g 1    metoprolol succinate ER (TOPROL XL) 25 MG 24 hr tablet Take 1 tablet (25 mg) by mouth daily 30 tablet 11    pantoprazole (PROTONIX) 20 MG EC tablet Take 20 mg by mouth daily      tiZANidine (ZANAFLEX) 2 MG tablet Take 1 tablet by mouth every 8 hours as needed for muscle spasms         Allergies   Allergen Reactions    Sulfamethoxazole-Trimethoprim Rash    Adhesive [Cyanoacrylate] Unknown     Contact allergy    Aspirin (Tartrazine Only) [Tartrazine] Unknown     Abdominal pain      Bee Venom Unknown    Cefuroxime Axetil [Cefuroxime] Unknown     Rash    Cephalosporins Unknown     Rash--ceftin    Chloral Hydrate Analogues [Chloral Hydrate] Unknown     Contact allergy    Chlorhexidine Gluconate [Chlorhexidine] Unknown     Contact allergy    Clindamycin Unknown     C diff    Codeine Unknown    Dairy [Milk (Cow)] Unknown    Doxepin Unknown     hyper    Ergot Alkaloids [Ergoloid Mesylates] Unknown    Erythromycin Base [Erythromycin] Unknown     Stomach pain    Formaldehyde Hives    Gluten [Gluten Meal] Unknown     Celiac panel not positive,  "patient states has abdominal pain when she eats gluten    Hydroxyzine Unknown    Latex Unknown    Morphine Unknown     Not effective    Nickel Unknown    Nortriptyline Unknown     hyper    Pregabalin Unknown    Pseudoephedrine Unknown    Sulfamethoxazole     Terconazole Unknown    Tramadol Unknown    Trazodone Nausea and Vomiting    Trimethoprim     Vancomycin Diarrhea    Venlafaxine Unknown    Vistaril [Hydroxyzine] Unknown    Amoxil [Amoxicillin] Rash    Menthyl Valerate Rash    Metronidazole Rash     Rash and neuropathy    Quaternium-15 Rash          Lab Results    Chemistry/lipid CBC Cardiac Enzymes/BNP/TSH/INR   Recent Labs   Lab Test 07/21/23  1030   CHOL 183   HDL 57   *   TRIG 55     Recent Labs   Lab Test 07/21/23  1030 09/28/22  0954 05/09/22  0915   * 132* 86     Recent Labs   Lab Test 07/21/23  1030      POTASSIUM 4.3   CHLORIDE 106   CO2 22   *   BUN 11.3   CR 0.96*   GFRESTIMATED 61   DOMITILA 9.2     Recent Labs   Lab Test 07/21/23  1030 06/19/23  1013 09/28/22  0954   CR 0.96* 1.1 1.04*     No results for input(s): \"A1C\" in the last 90519 hours.       Recent Labs   Lab Test 07/21/23  1030   WBC 9.1   HGB 14.0   HCT 41.8   MCV 97        Recent Labs   Lab Test 07/21/23  1030 09/28/22  0954 05/09/22  0915   HGB 14.0 14.9 13.5    No results for input(s): \"TROPONINI\" in the last 80924 hours.  No results for input(s): \"BNP\", \"NTBNPI\", \"NTBNP\" in the last 25776 hours.  Recent Labs   Lab Test 07/21/23  1030   TSH 1.57     No results for input(s): \"INR\" in the last 41649 hours.       Eleanor Coyle PA-C      Thank you for allowing me to participate in the care of your patient.      Sincerely,   Eleanor Coyle PA-C     [facility]  cc:   Dilan Espinoza MD  [address]  [place] [address]      "

## 2024-05-07 NOTE — PATIENT INSTRUCTIONS
Nicole Marcial,    It was a pleasure to see you today at the [facility].     My recommendations after this visit include:    - Let's try to increase metoprolol to 50 mg daily (can take two tablets)  - Continue other current medications  - Cholesterol check at PCP appt tomorrow- if LDL above 100 could consider increasing atorvastatin  - Follow up with Dr. Espinoza in 1 year, sooner if needed    - Please call [phone], if you have any questions or concerns      Eleanor Coyle PA-C

## 2024-05-08 ENCOUNTER — OFFICE VISIT (OUTPATIENT)
Dept: FAMILY MEDICINE | Facility: CLINIC | Age: 79
End: 2024-05-08
Payer: COMMERCIAL

## 2024-05-08 VITALS
DIASTOLIC BLOOD PRESSURE: 72 MMHG | HEIGHT: 61 IN | SYSTOLIC BLOOD PRESSURE: 138 MMHG | WEIGHT: 151 LBS | BODY MASS INDEX: 28.51 KG/M2 | OXYGEN SATURATION: 98 % | TEMPERATURE: 97.9 F | RESPIRATION RATE: 18 BRPM | HEART RATE: 73 BPM

## 2024-05-08 DIAGNOSIS — M79.7 FIBROMYALGIA: ICD-10-CM

## 2024-05-08 DIAGNOSIS — K21.9 GASTROESOPHAGEAL REFLUX DISEASE WITHOUT ESOPHAGITIS: ICD-10-CM

## 2024-05-08 DIAGNOSIS — Z00.00 MEDICARE ANNUAL WELLNESS VISIT, SUBSEQUENT: Primary | ICD-10-CM

## 2024-05-08 DIAGNOSIS — F41.9 ANXIETY: ICD-10-CM

## 2024-05-08 DIAGNOSIS — F51.01 PRIMARY INSOMNIA: ICD-10-CM

## 2024-05-08 DIAGNOSIS — J45.30 MILD PERSISTENT ASTHMA WITHOUT COMPLICATION: ICD-10-CM

## 2024-05-08 DIAGNOSIS — R00.0 TACHYCARDIA: ICD-10-CM

## 2024-05-08 DIAGNOSIS — H90.6 MIXED HEARING LOSS, BILATERAL: ICD-10-CM

## 2024-05-08 DIAGNOSIS — N89.8 VAGINAL DISCHARGE: ICD-10-CM

## 2024-05-08 DIAGNOSIS — D80.3 SELECTIVE DEFICIENCY OF IGG (H): ICD-10-CM

## 2024-05-08 DIAGNOSIS — G89.29 OTHER CHRONIC PAIN: ICD-10-CM

## 2024-05-08 DIAGNOSIS — Z79.890 CURRENT LONG-TERM USE OF POSTMENOPAUSAL HORMONE REPLACEMENT THERAPY: ICD-10-CM

## 2024-05-08 DIAGNOSIS — E78.2 MIXED HYPERLIPIDEMIA: ICD-10-CM

## 2024-05-08 DIAGNOSIS — N18.31 STAGE 3A CHRONIC KIDNEY DISEASE (H): ICD-10-CM

## 2024-05-08 DIAGNOSIS — M54.16 LUMBAR RADICULOPATHY: ICD-10-CM

## 2024-05-08 DIAGNOSIS — M81.0 SENILE OSTEOPOROSIS: ICD-10-CM

## 2024-05-08 DIAGNOSIS — J45.20 MILD INTERMITTENT ASTHMA WITHOUT COMPLICATION: ICD-10-CM

## 2024-05-08 DIAGNOSIS — I10 ESSENTIAL HYPERTENSION: ICD-10-CM

## 2024-05-08 LAB
ALBUMIN SERPL BCG-MCNC: 4.6 G/DL (ref 3.5–5.2)
ALP SERPL-CCNC: 69 U/L (ref 40–150)
ALT SERPL W P-5'-P-CCNC: 18 U/L (ref 0–50)
ANION GAP SERPL CALCULATED.3IONS-SCNC: 10 MMOL/L (ref 7–15)
AST SERPL W P-5'-P-CCNC: 23 U/L (ref 0–45)
BILIRUB SERPL-MCNC: 0.3 MG/DL
BUN SERPL-MCNC: 13.8 MG/DL (ref 8–23)
CALCIUM SERPL-MCNC: 9 MG/DL (ref 8.8–10.2)
CHLORIDE SERPL-SCNC: 106 MMOL/L (ref 98–107)
CHOLEST SERPL-MCNC: 152 MG/DL
CLUE CELLS: ABNORMAL
CREAT SERPL-MCNC: 1.1 MG/DL (ref 0.51–0.95)
DEPRECATED HCO3 PLAS-SCNC: 25 MMOL/L (ref 22–29)
EGFRCR SERPLBLD CKD-EPI 2021: 51 ML/MIN/1.73M2
FASTING STATUS PATIENT QL REPORTED: YES
FASTING STATUS PATIENT QL REPORTED: YES
GLUCOSE SERPL-MCNC: 102 MG/DL (ref 70–99)
HDLC SERPL-MCNC: 51 MG/DL
HGB BLD-MCNC: 13.9 G/DL (ref 11.7–15.7)
LDLC SERPL CALC-MCNC: 89 MG/DL
NONHDLC SERPL-MCNC: 101 MG/DL
POTASSIUM SERPL-SCNC: 4.4 MMOL/L (ref 3.4–5.3)
PROT SERPL-MCNC: 7.3 G/DL (ref 6.4–8.3)
SODIUM SERPL-SCNC: 141 MMOL/L (ref 135–145)
TRICHOMONAS, WET PREP: ABNORMAL
TRIGL SERPL-MCNC: 58 MG/DL
VIT D+METAB SERPL-MCNC: 42 NG/ML (ref 20–50)
WBC'S/HIGH POWER FIELD, WET PREP: ABNORMAL
YEAST, WET PREP: ABNORMAL

## 2024-05-08 PROCEDURE — 82787 IGG 1 2 3 OR 4 EACH: CPT | Performed by: FAMILY MEDICINE

## 2024-05-08 PROCEDURE — 83970 ASSAY OF PARATHORMONE: CPT | Performed by: FAMILY MEDICINE

## 2024-05-08 PROCEDURE — 80061 LIPID PANEL: CPT | Performed by: FAMILY MEDICINE

## 2024-05-08 PROCEDURE — G0439 PPPS, SUBSEQ VISIT: HCPCS | Performed by: FAMILY MEDICINE

## 2024-05-08 PROCEDURE — 87210 SMEAR WET MOUNT SALINE/INK: CPT | Performed by: FAMILY MEDICINE

## 2024-05-08 PROCEDURE — 90480 ADMN SARSCOV2 VAC 1/ONLY CMP: CPT | Performed by: FAMILY MEDICINE

## 2024-05-08 PROCEDURE — 80053 COMPREHEN METABOLIC PANEL: CPT | Performed by: FAMILY MEDICINE

## 2024-05-08 PROCEDURE — 36415 COLL VENOUS BLD VENIPUNCTURE: CPT | Performed by: FAMILY MEDICINE

## 2024-05-08 PROCEDURE — 82784 ASSAY IGA/IGD/IGG/IGM EACH: CPT | Mod: 59 | Performed by: FAMILY MEDICINE

## 2024-05-08 PROCEDURE — 91320 SARSCV2 VAC 30MCG TRS-SUC IM: CPT | Performed by: FAMILY MEDICINE

## 2024-05-08 PROCEDURE — 85018 HEMOGLOBIN: CPT | Performed by: FAMILY MEDICINE

## 2024-05-08 PROCEDURE — 82306 VITAMIN D 25 HYDROXY: CPT | Performed by: FAMILY MEDICINE

## 2024-05-08 PROCEDURE — 99214 OFFICE O/P EST MOD 30 MIN: CPT | Mod: 25 | Performed by: FAMILY MEDICINE

## 2024-05-08 PROCEDURE — 82784 ASSAY IGA/IGD/IGG/IGM EACH: CPT | Performed by: FAMILY MEDICINE

## 2024-05-08 RX ORDER — METOPROLOL SUCCINATE 25 MG/1
50 TABLET, EXTENDED RELEASE ORAL DAILY
Qty: 180 TABLET | Refills: 4 | Status: SHIPPED | OUTPATIENT
Start: 2024-05-08

## 2024-05-08 RX ORDER — ESTRADIOL AND NORETHINDRONE ACETATE 1; .5 MG/1; MG/1
1 TABLET ORAL DAILY
Qty: 84 TABLET | Refills: 4 | Status: SHIPPED | OUTPATIENT
Start: 2024-05-08

## 2024-05-08 RX ORDER — FLUTICASONE PROPIONATE 44 UG/1
1 AEROSOL, METERED RESPIRATORY (INHALATION) 2 TIMES DAILY
Qty: 10.6 G | Refills: 1 | Status: SHIPPED | OUTPATIENT
Start: 2024-05-08 | End: 2024-05-23

## 2024-05-08 RX ORDER — ATORVASTATIN CALCIUM 10 MG/1
10 TABLET, FILM COATED ORAL DAILY
Qty: 90 TABLET | Refills: 4 | Status: SHIPPED | OUTPATIENT
Start: 2024-05-08 | End: 2024-05-10 | Stop reason: DRUGHIGH

## 2024-05-08 RX ORDER — ALBUTEROL SULFATE 90 UG/1
2 AEROSOL, METERED RESPIRATORY (INHALATION) EVERY 6 HOURS PRN
Qty: 18 G | Refills: 3 | Status: SHIPPED | OUTPATIENT
Start: 2024-05-08

## 2024-05-08 ASSESSMENT — PAIN SCALES - GENERAL: PAINLEVEL: NO PAIN (0)

## 2024-05-08 NOTE — RESULT ENCOUNTER NOTE
There are no concerning findings on your test, in other words no signs of yeast infection or bacterial infection causing vaginal discharge.  It may be related to hormone intake, change of bacterial chang, or other external irritation.  I think we can safely keep an eye on this.  If you start developing any symptoms from it, be sure to let me know.

## 2024-05-08 NOTE — PROGRESS NOTES
Preventive Care Visit  [facility]  Shreya Diaz MD, Family Medicine  May 8, 2024      Assessment & Plan     Medicare annual wellness visit, subsequent  At today's visit, we discussed lifestyle interventions to promote self-management and wellness, including maintenance of a healthy weight, healthy diet, regular physical activity and exercise, and falls prevention.  COVID booster administered today, remainder of vaccines up-to-date.  Up-to-date with bone density screening.  She would like to continue mammogram screening.    Stage 3a chronic kidney disease (H)  Will continue to avoid nephrotoxic agents.  Could give consideration to transition from amlodipine to lisinopril in the future, we elected not make any changes at today's visit.  Will check renal function, hemoglobin, and parathyroid hormone level.  - Hemoglobin; Future  - Parathyroid Hormone Intact; Future  - Hemoglobin  - Parathyroid Hormone Intact    Selective deficiency of IgG (H)  Will reassess IgG levels.  IgM a little low in the past 2, will recheck this.  - IgG subclasses; Future  - IgM; Future  - IgG subclasses  - IgM    Mild persistent asthma without complication  Stable and controlled.  Continue fluticasone during spring and fall seasons, albuterol as needed.  - albuterol (PROAIR HFA/PROVENTIL HFA/VENTOLIN HFA) 108 (90 Base) MCG/ACT inhaler; Inhale 2 puffs into the lungs every 6 hours as needed for shortness of breath, wheezing or cough  - fluticasone (FLOVENT HFA) 44 MCG/ACT inhaler; Inhale 1 puff into the lungs 2 times daily    Hypertension  Stable and controlled currently on metoprolol and amlodipine, could give consideration to trial of lisinopril in the future instead of amlodipine.  Will check renal function and electrolytes today.    Mixed hyperlipidemia  Encouraged healthy lifestyle habits.  Continue atorvastatin.  Will check liver function and lipids today.  - Comprehensive metabolic panel; Future  - Lipid panel  "reflex to direct LDL Fasting; Future  - Comprehensive metabolic panel  - Lipid panel reflex to direct LDL Fasting    Fibromyalgia  Stable and controlled, continue amitriptyline.    Lumbar radiculopathy  Stable and controlled, continue amitriptyline.    Chronic Pain  Currently managing chronic pain well.    Mixed hearing loss, bilateral  She has hearing aids.    Gastroesophageal reflux disease without esophagitis  Doing well overall, unsuccessful in tapering pantoprazole, continue.    Osteoporosis Senile  Encourage weightbearing activities, measures to reduce risk of falls, adequate calcium and vitamin D intake.  Order placed for future bone density scan.  She will continue to follow with Dr. Chau.  - Vitamin D Deficiency; Future  - DX Bone Density; Future  - Vitamin D Deficiency    Anxiety  Stable and controlled on amitriptyline which she will continue.    Primary insomnia  Sleep is doing well on amitriptyline.  - amitriptyline (ELAVIL) 25 MG tablet; Take 2 tablets (50 mg) by mouth at bedtime    Tachycardia  Metoprolol working well to manage her tachycardia, continue, continue to follow-up with Dr. Espinoza.  - metoprolol succinate ER (TOPROL XL) 25 MG 24 hr tablet; Take 2 tablets (50 mg) by mouth daily    Current long-term use of postmenopausal hormone replacement therapy  This is contributing to bone density support.  At this time, benefit outweighs risks and she would like to continue.  - estradiol-norethindrone (ACTIVELLA) 1-0.5 MG tablet; Take 1 tablet by mouth daily    Vaginal discharge  Wet prep today unremarkable.  Etiology not apparent, but discharge is also not problematic.  Hormone replacement therapy may be contributing to this.  Will follow.  - Wet preparation              BMI  Estimated body mass index is 28.3 kg/m  as calculated from the following:    Height as of this encounter: 1.556 m (5' 1.25\").    Weight as of this encounter: 68.5 kg (151 lb).       Counseling  Appropriate preventive services " were discussed with this patient, including applicable screening as appropriate for fall prevention, nutrition, physical activity, Tobacco-use cessation, weight loss and cognition.  Checklist reviewing preventive services available has been given to the patient.  Reviewed patient's diet, addressing concerns and/or questions.   The patient was provided with written information regarding signs of hearing loss.           Yoshi Rivera is a 79 year old, presenting for the following:  Wellness Visit (fasting), vaginal discharge (On and off the past couple months, no itching or burning), and itching left nipple (Has derm apt next month)          Health Care Directive  Patient does not have a Health Care Directive or Living Will: Discussed advance care planning with patient; information given to patient to review.    Seen in clinic today for annual wellness visit.  She is fasting.  History of selective IgG and IgM deficiency, this has been stable and has not required further intervention, but seen previously by Dr. Gutierrez.  Her asthma has been doing well, usually uses Flovent just in the spring and fall, has not started it yet but asthma has been good well-controlled.  History of chronic kidney disease stage IIIa, she is not taking any ACE inhibitors or SGLT2 inhibitors currently.  Tendency towards sinus tachycardia, followed by Dr. Espinoza, metoprolol dose increased yesterday due to elevated heart rate.  Atorvastatin prescribed to manage cholesterol.  She is chronic pain related to fibromyalgia, lumbar radiculopathy, polyarthralgia, this has been stable and controlled on amitriptyline which is also helping manage her anxiety and insomnia, overall feels she is doing well.  Remains on hormone replacement therapy, tolerating well without side effects, using this to support her bone density.  She has topical acyclovir available for cold sores and that seems to work well for her.  Taking pantoprazole for reflux, has  attempted to taper unsuccessfully, overall is feeling okay on this medication.  Noting itching of her left nipple, has appointment with dermatology in about a month, has not noted rash in the area.  Noting vaginal discharge for the last 2 to 3 months, usually clear to white or gray in color, somewhat sticky, no associated pain, itching, bleeding, or irritation.  Recent visit to cardiology with leg swelling, metoprolol dose was increased and she was advised for compression stockings.          5/6/2024   General Health   How would you rate your overall physical health? Good   Feel stress (tense, anxious, or unable to sleep) To some extent   (!) STRESS CONCERN      5/6/2024   Nutrition   Diet: Regular (no restrictions)         5/6/2024   Exercise   Days per week of moderate/strenous exercise 7 days   Average minutes spent exercising at this level 30 min         5/6/2024   Social Factors   Frequency of gathering with friends or relatives More than three times a week   Worry food won't last until get money to buy more No   Food not last or not have enough money for food? No   Do you have housing?  Yes   Are you worried about losing your housing? No   Lack of transportation? No   Unable to get utilities (heat,electricity)? No         5/6/2024   Fall Risk   Fallen 2 or more times in the past year? No   Trouble with walking or balance? No          5/6/2024   Activities of Daily Living- Home Safety   Needs help with the following daily activites None of the above   Safety concerns in the home None of the above         5/6/2024   Dental   Dentist two times every year? Yes         5/6/2024   Hearing Screening   Hearing concerns? (!) I FEEL THAT PEOPLE ARE MUMBLING OR NOT SPEAKING CLEARLY.    (!) I NEED TO ASK PEOPLE TO SPEAK UP OR REPEAT THEMSELVES.    (!) IT'S HARD TO FOLLOW A CONVERSATION IN A NOISY RESTAURANT OR CROWDED ROOM.    (!) TROUBLE UNDERSTANDING SOFT OR WHISPERED SPEECH.         5/6/2024   Driving Risk Screening    Patient/family members have concerns about driving No         5/6/2024   General Alertness/Fatigue Screening   Have you been more tired than usual lately? No         5/6/2024   Urinary Incontinence Screening   Bothered by leaking urine in past 6 months No         5/6/2024   TB Screening   Were you born outside of the [place]? No         Today's PHQ-2 Score:       5/8/2024     8:30 AM   PHQ-2 ( 1999 [employer])   Q1: Little interest or pleasure in doing things 0   Q2: Feeling down, depressed or hopeless 0   PHQ-2 Score 0           5/6/2024   Substance Use   Alcohol more than 3/day or more than 7/wk No   Do you have a current opioid prescription? No   How severe/bad is pain from 1 to 10? 6/10   Do you use any other substances recreationally? (!) CANNABIS PRODUCTS     Social History     Tobacco Use    Smoking status: Never    Smokeless tobacco: Never   Vaping Use    Vaping status: Never Used   Substance Use Topics    Alcohol use: Yes     Alcohol/week: 6.0 standard drinks of alcohol     Comment: Alcoholic Drinks/day: hard cider    Drug use: Not Currently     Types: Marijuana     Comment: Drug use: very rare           9/25/2023   LAST FHS-7 RESULTS   1st degree relative breast or ovarian cancer No   Any relative bilateral breast cancer No   Any male have breast cancer No   Any ONE woman have BOTH breast AND ovarian cancer No   Any woman with breast cancer before 50yrs No   2 or more relatives with breast AND/OR ovarian cancer No   2 or more relatives with breast AND/OR bowel cancer No            ASCVD Risk   The 10-year ASCVD risk score (Samia NGUYEN, et al., 2019) is: 32.3%    Values used to calculate the score:      Age: 79 years      Sex: Female      Is Non- : No      Diabetic: No      Tobacco smoker: No      Systolic Blood Pressure: 138 mmHg      Is BP treated: Yes      HDL Cholesterol: 57 mg/dL      Total Cholesterol: 183 mg/dL            Reviewed and updated as needed this visit by Provider    "                   Current providers sharing in care for this patient include:  Patient Care Team:  Shreya Diaz MD as PCP - General (Family Medicine)  Дмитрий Henriquez MD as MD (Orthopedics)  Rich Oconnor RPH as Pharmacist (Pharmacist)  Rich Oconnor RPH as Assigned MTM Pharmacist  Dilan Espinoza MD as Assigned Heart and Vascular Provider  Shreya Diaz MD as Assigned PCP  Eleanor Coyle PA-C as Physician Assistant (Physician Assistant - Medical)    The following health maintenance items are reviewed in Epic and correct as of today:  Health Maintenance   Topic Date Due    MEDICARE ANNUAL WELLNESS VISIT  10/27/2022    COVID-19 Vaccine (7 - 2023-24 season) 12/15/2023    HEMOGLOBIN  07/21/2024    BMP  07/21/2024    LIPID  07/21/2024    MICROALBUMIN  07/21/2024    ANNUAL REVIEW OF HM ORDERS  07/21/2024    ASTHMA CONTROL TEST  11/08/2024    ASTHMA ACTION PLAN  01/25/2025    FALL RISK ASSESSMENT  05/08/2025    GLUCOSE  07/21/2026    ADVANCE CARE PLANNING  10/31/2026    DTAP/TDAP/TD IMMUNIZATION (5 - Td or Tdap) 04/11/2028    DEXA  07/23/2036    HEPATITIS C SCREENING  Completed    PHQ-2 (once per calendar year)  Completed    INFLUENZA VACCINE  Completed    Pneumococcal Vaccine: 65+ Years  Completed    URINALYSIS  Completed    ZOSTER IMMUNIZATION  Completed    RSV VACCINE (Pregnancy & 60+)  Completed    IPV IMMUNIZATION  Aged Out    HPV IMMUNIZATION  Aged Out    MENINGITIS IMMUNIZATION  Aged Out    RSV MONOCLONAL ANTIBODY  Aged Out    URINE DRUG SCREEN  Discontinued    MAMMO SCREENING  Discontinued    COLORECTAL CANCER SCREENING  Discontinued         Review of Systems  Constitutional, neuro, ENT, endocrine, pulmonary, cardiac, gastrointestinal, genitourinary, musculoskeletal, integument and psychiatric systems are negative, except as otherwise noted.     Objective    Exam  /72   Pulse 73   Temp 97.9  F (36.6  C) (Temporal)   Resp 18   Ht 1.556 m (5' 1.25\")   Wt 68.5 kg (151 lb)   LMP  (LMP " "Unknown)   SpO2 98%   BMI 28.30 kg/m     Estimated body mass index is 28.3 kg/m  as calculated from the following:    Height as of this encounter: 1.556 m (5' 1.25\").    Weight as of this encounter: 68.5 kg (151 lb).    Physical Exam  Physical Examination: General appearance - alert, well appearing, and in no distress, oriented to person, place, and time and normal appearing weight  Mental status - alert, oriented to person, place, and time, normal mood, behavior, speech, dress, motor activity, and thought processes  Eyes - pupils equal and reactive, extraocular eye movements intact  Ears - bilateral TM's and external ear canals normal  Nose - normal and patent, no erythema, discharge or polyps  Mouth - mucous membranes moist, pharynx normal without lesions  Neck - supple, no significant adenopathy  Lymphatics - no palpable lymphadenopathy, no hepatosplenomegaly  Chest - clear to auscultation, no wheezes, rales or rhonchi, symmetric air entry  Heart - normal rate, regular rhythm, normal S1, S2, no murmurs, rubs, clicks or gallops  Abdomen - soft, nontender, nondistended, no masses or organomegaly  Breasts - breasts appear normal, no suspicious masses, no skin or nipple changes or axillary nodes  Neurological - alert, oriented, normal speech, no focal findings or movement disorder noted  Musculoskeletal - no joint tenderness, deformity or swelling  Extremities - peripheral pulses normal, no pedal edema, no clubbing or cyanosis  Skin - normal coloration and turgor, no rashes, no suspicious skin lesions noted    Pelvic - Normal external female genitalia.  Vaginal and cervical mucosa with mild atrophy on speculum exam.  Moderate amount of thin white discharge noted in vaginal vault, no skin lesions, inflammation, wet prep obtained.         5/8/2024   Mini Cog   Clock Draw Score 2 Normal   3 Item Recall 3 objects recalled   Mini Cog Total Score 5              Signed Electronically by: Shreya Diaz MD    "

## 2024-05-08 NOTE — PATIENT INSTRUCTIONS
"Preventive Care Advice   This is general advice we often give to help people stay healthy. Your care team may have specific advice just for you. Please talk to your care team about your own preventive care needs.  Lifestyle  Exercise at least 150 minutes each week (30 minutes a day, 5 days a week).  Do muscle strengthening activities 2 days a week. These help control your weight and prevent disease.  No smoking.  Wear sunscreen to prevent skin cancer.  Have your home tested for radon every 2 to 5 years. Radon is a colorless, odorless gas that can harm your lungs. To learn more, go to www.health.[facility].mn. and search for \"Radon in Homes.\"  Keep guns unloaded and locked up in a safe place like a safe or gun vault, or, use a gun lock and hide the keys. Always lock away bullets separately. To learn more, visit [employer].[place].gov and search for \"safe gun storage.\"  Nutrition  Eat 5 or more servings of fruits and vegetables each day.  Try wheat bread, brown rice and whole grain pasta (instead of white bread, rice, and pasta).  Get enough calcium and vitamin D. Check the label on foods and aim for 100% of the RDA (recommended daily allowance).  Regular exams  Have a dental exam and cleaning every 6 months.  See your health care team every year to talk about:  Any changes in your health.  Any medicines your care team has prescribed.  Preventive care, family planning, and ways to prevent chronic diseases.  Shots (vaccines)   HPV shots (up to age 26), if you've never had them before.  Hepatitis B shots (up to age 59), if you've never had them before.  COVID-19 shot: Get this shot when it's due.  Flu shot: Get a flu shot every year.  Tetanus shot: Get a tetanus shot every 10 years.  Pneumococcal, hepatitis A, and RSV shots: Ask your care team if you need these based on your risk.  Shingles shot (for age 50 and up).  General health tests  Diabetes screening:  Starting at age 35, Get screened for diabetes at least every 3 years.  If " you are younger than age 35, ask your care team if you should be screened for diabetes.  Cholesterol test: At age 39, start having a cholesterol test every 5 years, or more often if advised.  Bone density scan (DEXA): At age 50, ask your care team if you should have this scan for osteoporosis (brittle bones).  Hepatitis C: Get tested at least once in your life.  Abdominal aortic aneurysm screening: Talk to your doctor about having this screening if you:  Have ever smoked; and  Are biologically male; and  Are between the ages of 65 and 75.  STIs (sexually transmitted infections)  Before age 24: Ask your care team if you should be screened for STIs.  After age 24: Get screened for STIs if you're at risk. You are at risk for STIs (including HIV) if:  You are sexually active with more than one person.  You don't use condoms every time.  You or a partner was diagnosed with a sexually transmitted infection.  If you are at risk for HIV, ask about PrEP medicine to prevent HIV.  Get tested for HIV at least once in your life, whether you are at risk for HIV or not.  Cancer screening tests  Cervical cancer screening: If you have a cervix, begin getting regular cervical cancer screening tests at age 21. Most people who have regular screenings with normal results can stop after age 65. Talk about this with your provider.  Breast cancer scan (mammogram): If you've ever had breasts, begin having regular mammograms starting at age 40. This is a scan to check for breast cancer.  Colon cancer screening: It is important to start screening for colon cancer at age 45.  Have a colonoscopy test every 10 years (or more often if you're at risk) Or, ask your provider about stool tests like a FIT test every year or Cologuard test every 3 years.  To learn more about your testing options, visit: www.[employer]/812571.pdf.  For help making a decision, visit: kevin/cp51365.  Prostate cancer screening test: If you have a prostate and are age 55  to 69, ask your provider if you would benefit from a yearly prostate cancer screening test.  Lung cancer screening: If you are a current or former smoker age 50 to 80, ask your care team if ongoing lung cancer screenings are right for you.  For informational purposes only. Not to replace the advice of your health care provider. Copyright   2023 [employer]. All rights reserved. Clinically reviewed by the [facility] Transitions Program. [employer] 422694 - REV 04/24.    Hearing Loss: Care Instructions  Overview     Hearing loss is a sudden or slow decrease in how well you hear. It can range from slight to profound. Permanent hearing loss can occur with aging. It also can happen when you are exposed long-term to loud noise. Examples include listening to loud music, riding motorcycles, or being around other loud machines.  Hearing loss can affect your work and home life. It can make you feel lonely or depressed. You may feel that you have lost your independence. But hearing aids and other devices can help you hear better and feel connected to others.  Follow-up care is a key part of your treatment and safety. Be sure to make and go to all appointments, and call your doctor if you are having problems. It's also a good idea to know your test results and keep a list of the medicines you take.  How can you care for yourself at home?  Avoid loud noises whenever possible. This helps keep your hearing from getting worse.  Always wear hearing protection around loud noises.  Wear a hearing aid as directed.  A professional can help you pick a hearing aid that will work best for you.  You can also get hearing aids over the counter for mild to moderate hearing loss.  Have hearing tests as your doctor suggests. They can show whether your hearing has changed. Your hearing aid may need to be adjusted.  Use other devices as needed. These may include:  Telephone amplifiers and hearing aids that can connect to a  "television, stereo, radio, or microphone.  Devices that use lights or vibrations. These alert you to the doorbell, a ringing telephone, or a baby monitor.  Television closed-captioning. This shows the words at the bottom of the screen. Most new TVs can do this.  TTY (text telephone). This lets you type messages back and forth on the telephone instead of talking or listening. These devices are also called TDD. When messages are typed on the keyboard, they are sent over the phone line to a receiving TTY. The message is shown on a monitor.  Use text messaging, social media, and email if it is hard for you to communicate by telephone.  Try to learn a listening technique called speechreading. It is not lipreading. You pay attention to people's gestures, expressions, posture, and tone of voice. These clues can help you understand what a person is saying. Face the person you are talking to, and have them face you. Make sure the lighting is good. You need to see the other person's face clearly.  Think about counseling if you need help to adjust to your hearing loss.  When should you call for help?  Watch closely for changes in your health, and be sure to contact your doctor if:    You think your hearing is getting worse.     You have new symptoms, such as dizziness or nausea.   Where can you learn more?  Go to https://www.[employer].net/patiented  Enter R798 in the search box to learn more about \"Hearing Loss: Care Instructions.\"  Current as of: September 27, 2023               Content Version: 14.0    0937-6886 [employer].   Care instructions adapted under license by your healthcare professional. If you have questions about a medical condition or this instruction, always ask your healthcare professional. Healthwise, [employer] disclaims any warranty or liability for your use of this information.      Learning About Stress  What is stress?     Stress is your body's response to a hard situation. Your body can " have a physical, emotional, or mental response. Stress is a fact of life for most people, and it affects everyone differently. What causes stress for you may not be stressful for someone else.  A lot of things can cause stress. You may feel stress when you go on a job interview, take a test, or run a race. This kind of short-term stress is normal and even useful. It can help you if you need to work hard or react quickly. For example, stress can help you finish an important job on time.  Long-term stress is caused by ongoing stressful situations or events. Examples of long-term stress include long-term health problems, ongoing problems at work, or conflicts in your family. Long-term stress can harm your health.  How does stress affect your health?  When you are stressed, your body responds as though you are in danger. It makes hormones that speed up your heart, make you breathe faster, and give you a burst of energy. This is called the fight-or-flight stress response. If the stress is over quickly, your body goes back to normal and no harm is done.  But if stress happens too often or lasts too long, it can have bad effects. Long-term stress can make you more likely to get sick, and it can make symptoms of some diseases worse. If you tense up when you are stressed, you may develop neck, shoulder, or low back pain. Stress is linked to high blood pressure and heart disease.  Stress also harms your emotional health. It can make you dougherty, tense, or depressed. Your relationships may suffer, and you may not do well at work or school.  What can you do to manage stress?  You can try these things to help manage stress:   Do something active. Exercise or activity can help reduce stress. Walking is a great way to get started. Even everyday activities such as housecleaning or yard work can help.  Try yoga or lizett chi. These techniques combine exercise and meditation. You may need some training at first to learn them.  Do  "something you enjoy. For example, listen to music or go to a movie. Practice your hobby or do volunteer work.  Meditate. This can help you relax, because you are not worrying about what happened before or what may happen in the future.  Do guided imagery. Imagine yourself in any setting that helps you feel calm. You can use online videos, books, or a teacher to guide you.  Do breathing exercises. For example:  From a standing position, bend forward from the waist with your knees slightly bent. Let your arms dangle close to the floor.  Breathe in slowly and deeply as you return to a standing position. Roll up slowly and lift your head last.  Hold your breath for just a few seconds in the standing position.  Breathe out slowly and bend forward from the waist.  Let your feelings out. Talk, laugh, cry, and express anger when you need to. Talking with supportive friends or family, a counselor, or a george leader about your feelings is a healthy way to relieve stress. Avoid discussing your feelings with people who make you feel worse.  Write. It may help to write about things that are bothering you. This helps you find out how much stress you feel and what is causing it. When you know this, you can find better ways to cope.  What can you do to prevent stress?  You might try some of these things to help prevent stress:  Manage your time. This helps you find time to do the things you want and need to do.  Get enough sleep. Your body recovers from the stresses of the day while you are sleeping.  Get support. Your family, friends, and community can make a difference in how you experience stress.  Limit your news feed. Avoid or limit time on social media or news that may make you feel stressed.  Do something active. Exercise or activity can help reduce stress. Walking is a great way to get started.  Where can you learn more?  Go to https://www.healthwise.net/patiented  Enter N032 in the search box to learn more about \"Learning " "About Stress.\"  Current as of: October 24, 2023               Content Version: 14.0    2344-6240 [employer].   Care instructions adapted under license by your healthcare professional. If you have questions about a medical condition or this instruction, always ask your healthcare professional. [employer] disclaims any warranty or liability for your use of this information.      Substance Use Disorder: Care Instructions  Overview     You can improve your life and health by stopping your use of alcohol or drugs. When you don't drink or use drugs, you may feel and sleep better. You may get along better with your family, friends, and coworkers. There are medicines and programs that can help with substance use disorder.  How can you care for yourself at home?  Here are some ways to help you stay sober and prevent relapse.  If you have been given medicine to help keep you sober or reduce your cravings, be sure to take it exactly as prescribed.  Talk to your doctor about programs that can help you stop using drugs or drinking alcohol.  Do not keep alcohol or drugs in your home.  Plan ahead. Think about what you'll say if other people ask you to drink or use drugs. Try not to spend time with people who drink or use drugs.  Use the time and money spent on drinking or drugs to do something that's important to you.  Preventing a relapse  Have a plan to deal with relapse. Learn to recognize changes in your thinking that lead you to drink or use drugs. Get help before you start to drink or use drugs again.  Try to stay away from situations, friends, or places that may lead you to drink or use drugs.  If you feel the need to drink alcohol or use drugs again, seek help right away. Call a trusted friend or family member. Some people get support from organizations such as Narcotics Anonymous or SNADEC or from treatment facilities.  If you relapse, get help as soon as you can. Some people make a " plan with another person that outlines what they want that person to do for them if they relapse. The plan usually includes how to handle the relapse and who to notify in case of relapse.  Don't give up. Remember that a relapse doesn't mean that you have failed. Use the experience to learn the triggers that lead you to drink or use drugs. Then quit again. Recovery is a lifelong process. Many people have several relapses before they are able to quit for good.  Follow-up care is a key part of your treatment and safety. Be sure to make and go to all appointments, and call your doctor if you are having problems. It's also a good idea to know your test results and keep a list of the medicines you take.  When should you call for help?   Call 911  anytime you think you may need emergency care. For example, call if you or someone else:    Has overdosed or has withdrawal signs. Be sure to tell the emergency workers that you are or someone else is using or trying to quit using drugs. Overdose or withdrawal signs may include:  Losing consciousness.  Seizure.  Seeing or hearing things that aren't there (hallucinations).     Is thinking or talking about suicide or harming others.   Where to get help 24 hours a day, 7 days a week   If you or someone you know talks about suicide, self-harm, a mental health crisis, a substance use crisis, or any other kind of emotional distress, get help right away. You can:    Call the Suicide and Crisis Lifeline at 989.     Call 1-665-838-TALK (1-239.353.7045).     Text HOME to 118415 to access the Crisis Text Line.   Consider saving these numbers in your phone.  Go to [employer].org for more information or to chat online.  Call your doctor now or seek immediate medical care if:    You are having withdrawal symptoms. These may include nausea or vomiting, sweating, shakiness, and anxiety.   Watch closely for changes in your health, and be sure to contact your doctor if:    You have a relapse.      "You need more help or support to stop.   Where can you learn more?  Go to https://www.healthDistra.net/patiented  Enter H573 in the search box to learn more about \"Substance Use Disorder: Care Instructions.\"  Current as of: November 15, 2023               Content Version: 14.0    8670-1673 Healthwise, [employer].   Care instructions adapted under license by your healthcare professional. If you have questions about a medical condition or this instruction, always ask your healthcare professional. Healthwise, [employer] disclaims any warranty or liability for your use of this information.      "

## 2024-05-09 LAB — PTH-INTACT SERPL-MCNC: 43 PG/ML (ref 15–65)

## 2024-05-09 RX ORDER — FLUTICASONE PROPIONATE 44 UG/1
1 AEROSOL, METERED RESPIRATORY (INHALATION) 2 TIMES DAILY
Qty: 11 G | Refills: 1 | OUTPATIENT
Start: 2024-05-09

## 2024-05-10 DIAGNOSIS — E78.2 MIXED HYPERLIPIDEMIA: ICD-10-CM

## 2024-05-10 LAB
IGG SERPL-MCNC: 858 MG/DL (ref 610–1616)
IGG1 SER-MCNC: 520 MG/DL (ref 382–929)
IGG2 SER-MCNC: 169 MG/DL (ref 242–700)
IGG3 SER-MCNC: 27 MG/DL (ref 22–176)
IGG4 SER-MCNC: 24 MG/DL (ref 4–86)
IGM SERPL-MCNC: 13 MG/DL (ref 35–242)
SUBCLASSES, PERCENT: 86 %

## 2024-05-10 RX ORDER — ATORVASTATIN CALCIUM 20 MG/1
20 TABLET, FILM COATED ORAL DAILY
Qty: 90 TABLET | Refills: 1 | Status: SHIPPED | OUTPATIENT
Start: 2024-05-10

## 2024-05-10 NOTE — PROGRESS NOTES
Atorvastatin dose updated.  -ale Mendez Nicole-     Your LDL or bad cholesterol came down to 89.  In patients without known coronary artery disease I favor this number to be under 100.  If patients have known plaque buildup in the arteries of the heart I favor being under 70.  Your CT scan showed minimal nonobstructive disease in 1 area of your heart.  If we wanted to be aggressive we could increase your atorvastatin to 20 mg daily since you are on a low-dose.  If you develop muscle aches or pains we could always decrease this back to 10 mg daily.  Otherwise would encourage heart healthy diet low in saturated fats and increasing exercise.  Let me know your thoughts.        Regards,  Eleanor Coyle PA-C   Written by Eleanor Coyle PA-C on 5/9/2024 11:37 AM CDT

## 2024-05-13 DIAGNOSIS — R00.0 TACHYCARDIA: ICD-10-CM

## 2024-05-23 DIAGNOSIS — J45.30 MILD PERSISTENT ASTHMA WITHOUT COMPLICATION: ICD-10-CM

## 2024-05-23 NOTE — TELEPHONE ENCOUNTER
Pending Prescriptions:                       Disp   Refills    fluticasone (ARNUITY ELLIPTA) 200 MCG/ACT*11 each1            Sig: Inhale 1 puff into the lungs daily

## 2024-05-24 RX ORDER — FLUTICASONE FUROATE 200 UG/1
1 POWDER RESPIRATORY (INHALATION) DAILY
Qty: 11 EACH | Refills: 1 | Status: SHIPPED | OUTPATIENT
Start: 2024-05-24

## 2024-06-03 DIAGNOSIS — I10 ESSENTIAL HYPERTENSION: ICD-10-CM

## 2024-06-03 RX ORDER — AMLODIPINE BESYLATE 5 MG/1
5 TABLET ORAL DAILY
Qty: 90 TABLET | Refills: 3 | Status: SHIPPED | OUTPATIENT
Start: 2024-06-03

## 2024-06-10 ENCOUNTER — TRANSFERRED RECORDS (OUTPATIENT)
Dept: HEALTH INFORMATION MANAGEMENT | Facility: CLINIC | Age: 79
End: 2024-06-10
Payer: COMMERCIAL

## 2024-07-10 ENCOUNTER — OFFICE VISIT (OUTPATIENT)
Dept: INTERNAL MEDICINE | Facility: CLINIC | Age: 79
End: 2024-07-10
Payer: COMMERCIAL

## 2024-07-10 ENCOUNTER — ANCILLARY PROCEDURE (OUTPATIENT)
Dept: BONE DENSITY | Facility: CLINIC | Age: 79
End: 2024-07-10
Attending: FAMILY MEDICINE
Payer: COMMERCIAL

## 2024-07-10 ENCOUNTER — TRANSFERRED RECORDS (OUTPATIENT)
Dept: HEALTH INFORMATION MANAGEMENT | Facility: CLINIC | Age: 79
End: 2024-07-10

## 2024-07-10 VITALS
OXYGEN SATURATION: 95 % | TEMPERATURE: 97 F | WEIGHT: 149.9 LBS | DIASTOLIC BLOOD PRESSURE: 70 MMHG | BODY MASS INDEX: 28.3 KG/M2 | SYSTOLIC BLOOD PRESSURE: 132 MMHG | HEIGHT: 61 IN | HEART RATE: 76 BPM | RESPIRATION RATE: 16 BRPM

## 2024-07-10 DIAGNOSIS — K21.9 GASTROESOPHAGEAL REFLUX DISEASE WITHOUT ESOPHAGITIS: ICD-10-CM

## 2024-07-10 DIAGNOSIS — M81.0 SENILE OSTEOPOROSIS: Primary | ICD-10-CM

## 2024-07-10 DIAGNOSIS — N18.31 STAGE 3A CHRONIC KIDNEY DISEASE (H): ICD-10-CM

## 2024-07-10 DIAGNOSIS — J45.30 MILD PERSISTENT ASTHMA WITHOUT COMPLICATION: ICD-10-CM

## 2024-07-10 DIAGNOSIS — M81.0 SENILE OSTEOPOROSIS: ICD-10-CM

## 2024-07-10 DIAGNOSIS — Z79.890 NEED FOR PROPHYLACTIC HORMONE REPLACEMENT THERAPY (POSTMENOPAUSAL): ICD-10-CM

## 2024-07-10 PROCEDURE — 77089 TBS DXA CAL W/I&R FX RISK: CPT | Performed by: PHYSICIAN ASSISTANT

## 2024-07-10 PROCEDURE — 77081 DXA BONE DENSITY APPENDICULR: CPT | Mod: TC | Performed by: PHYSICIAN ASSISTANT

## 2024-07-10 PROCEDURE — 77080 DXA BONE DENSITY AXIAL: CPT | Mod: TC | Performed by: PHYSICIAN ASSISTANT

## 2024-07-10 PROCEDURE — G2211 COMPLEX E/M VISIT ADD ON: HCPCS | Performed by: INTERNAL MEDICINE

## 2024-07-10 PROCEDURE — 99214 OFFICE O/P EST MOD 30 MIN: CPT | Performed by: INTERNAL MEDICINE

## 2024-07-10 NOTE — PATIENT INSTRUCTIONS
Your DXA scan showed osteopenia with moderate fracture risk.    Appropriate calcium, vitamin D supplements, along with balance and weight bearing exercise recommended with followup bone density scan in 2 years.     Repeat DXA scan in 2 years.

## 2024-07-10 NOTE — PROGRESS NOTES
Assessment & Plan     Osteoporosis Senile  Nicole Marcial is a 79 year old female  here for follow up.  Last visit was in 8/2021.  She had Reclast infusion in 2017, 2018 and 2021.She tolerated Reclast infusion very well.   She was treated with Prolia for a few years but developed eczema after few shots and decided to stop.   She is on postmenopausal HRT for many years.  She denies history of fragility fractures.  DXA was done today and was reviewed with the patient.  L1-L2 T-score. -2.2 with no significant change compared to 2021.  RFN T-score -1.3 with no significant change compared to 2021.  1/3 radius T-score 0.1.    FRAX MOP 13 %, hip 2.7%    Patient has osteopenia with moderate fracture risk.No need for active treatment at this point.  Appropriate calcium, vitamin D supplements, along with balance and weight bearing exercise recommended with followup bone density scan in 2 years.     Labs reviewed:  Component      Latest Ref Rng 5/8/2024  9:24 AM   Sodium      135 - 145 mmol/L 141    Potassium      3.4 - 5.3 mmol/L 4.4    Carbon Dioxide (CO2)      22 - 29 mmol/L 25    Anion Gap      7 - 15 mmol/L 10    Urea Nitrogen      8.0 - 23.0 mg/dL 13.8    Creatinine      0.51 - 0.95 mg/dL 1.10 (H)    GFR Estimate      >60 mL/min/1.73m2 51 (L)    Calcium      8.8 - 10.2 mg/dL 9.0    Chloride      98 - 107 mmol/L 106    Glucose      70 - 99 mg/dL 102 (H)    Alkaline Phosphatase      40 - 150 U/L 69    AST      0 - 45 U/L 23    ALT      0 - 50 U/L 18    Protein Total      6.4 - 8.3 g/dL 7.3    Albumin      3.5 - 5.2 g/dL 4.6    Bilirubin Total      <=1.2 mg/dL 0.3    Patient Fasting? Yes    Vitamin D, Total (25-Hydroxy)      20 - 50 ng/mL 42           Taking Female Hormones For Postmenopausal HRT  For many years, was not able to stop this treatment because of the severe postmenopausal symptoms.    Mild persistent asthma without complication  Stable on using Arnuity ellipta as needed.    Gastroesophageal reflux disease  "without esophagitis  Stable on Protonix.    Stage 3a chronic kidney disease (H)  stable      The longitudinal plan of care for the diagnosis(es)/condition(s) as documented were addressed during this visit. Due to the added complexity in care, I will continue to support Nicole in the subsequent management and with ongoing continuity of care.    BMI  Estimated body mass index is 28.09 kg/m  as calculated from the following:    Height as of this encounter: 1.556 m (5' 1.25\").    Weight as of this encounter: 68 kg (149 lb 14.4 oz).         Patient Instructions   Your DXA scan showed osteopenia with moderate fracture risk.    Appropriate calcium, vitamin D supplements, along with balance and weight bearing exercise recommended with followup bone density scan in 2 years.     Repeat DXA scan in 2 years.     Subjective   Nicole is a 79 year old, presenting for the following health issues:  Osteoporosis (Discuss DXA Scan)      7/10/2024    11:42 AM   Additional Questions   Roomed by Albina GARCIA                 Objective    /70   Pulse 76   Temp 97  F (36.1  C)   Resp 16   Ht 1.556 m (5' 1.25\")   Wt 68 kg (149 lb 14.4 oz)   LMP  (LMP Unknown)   SpO2 95%   BMI 28.09 kg/m    Body mass index is 28.09 kg/m .  Physical Exam               Signed Electronically by: Viry Chau MD    "

## 2024-08-26 ENCOUNTER — PATIENT OUTREACH (OUTPATIENT)
Dept: CARE COORDINATION | Facility: CLINIC | Age: 79
End: 2024-08-26
Payer: COMMERCIAL

## 2024-09-09 ENCOUNTER — OFFICE VISIT (OUTPATIENT)
Dept: FAMILY MEDICINE | Facility: CLINIC | Age: 79
End: 2024-09-09
Payer: COMMERCIAL

## 2024-09-09 VITALS
DIASTOLIC BLOOD PRESSURE: 85 MMHG | RESPIRATION RATE: 16 BRPM | SYSTOLIC BLOOD PRESSURE: 161 MMHG | TEMPERATURE: 98.3 F | HEART RATE: 91 BPM | OXYGEN SATURATION: 98 %

## 2024-09-09 DIAGNOSIS — J06.9 UPPER RESPIRATORY TRACT INFECTION, UNSPECIFIED TYPE: ICD-10-CM

## 2024-09-09 DIAGNOSIS — R51.9 ACUTE INTRACTABLE HEADACHE, UNSPECIFIED HEADACHE TYPE: Primary | ICD-10-CM

## 2024-09-09 LAB — SARS-COV-2 RNA RESP QL NAA+PROBE: NEGATIVE

## 2024-09-09 PROCEDURE — 99213 OFFICE O/P EST LOW 20 MIN: CPT | Performed by: NURSE PRACTITIONER

## 2024-09-09 PROCEDURE — 87635 SARS-COV-2 COVID-19 AMP PRB: CPT | Performed by: NURSE PRACTITIONER

## 2024-09-09 NOTE — PATIENT INSTRUCTIONS
Mucinex for congestion (guaifenesin plain)     Tylenol 1000 mg 3 times daily as needed if it's not in your cough medicine.      You are experiencing common viral symptoms. Viruses take 2-3 weeks to resolve on average. Antibiotics don't work on viruses.      Recheck if fevers, shortness of breath, persistent ear pain or not starting to feel better in about 7 days      COVID test.  Your results will come to MyChart tomorrow or tonight.

## 2024-09-09 NOTE — PROGRESS NOTES
Assessment & Plan     Upper respiratory tract infection, unspecified type    - Symptomatic COVID-19 Virus (Coronavirus) by PCR Nose    Acute intractable headache, unspecified headache type    - Symptomatic COVID-19 Virus (Coronavirus) by PCR Nose       Focused exam and history done due to COVID-19 pandemic in a walk-in setting.      History, exam, and vital signs consistent with a viral URI.  Headache associated with congestion and mild cough.  Lungs are clear today.  Feels like previous COVID.  Wants a COVID test primarily.  No shortness of breath.  Had paxlovid previously and tolerated it other than bad taste.  Would need reduced kidney function dosing if positive.  Recommend rechecking in 2 days with home test if our test is negative.    No red flags.     Isolate pending covid results and as per CDC guidelines which were reviewed.    Recheck if shortness of breath or new fevers develop.  Rest.     OTCs recommended: None [   ].  Dextromethorphan  [  ], guaifenesin [  x], pseudoephedrine [   ], Afrin for no greater than 3 days [  ], Tylenol  [x  ].      Estimated Creatinine Clearance: 37.4 mL/min (A) (based on SCr of 1.1 mg/dL (H)).                Return in about 1 year (around 9/9/2025).    [facility]    Yoshi Rivera is a 79 year old female who presents to clinic today for the following health issues:  Chief Complaint   Patient presents with    Cough     Has had  slight cough chest congestion    Headache     Has had headache for 2 days      HPI    Patient with a history of asthma being treated with Arnuity elliptica and albuterol as needed presents to walk-in clinic for congestion with cough, congestion and headache for 2 days. Pain is 8/10.  Feels like previous covid.  Wants covid test.      Headache is a ring around the top of the head.      Bilateral ear pain.       Feels like asthma is doing well now.       Of note, she has an allergy to  numerous drug classes of antibiotics.    Has previously had paxlovid and tolerated it.  History of CKD with creatinine clearance 37.     Did try Tylenol yesterday which helped the headache.        Review of Systems    See HPI         Objective    BP (!) 161/85   Pulse 91   Temp 98.3  F (36.8  C) (Oral)   Resp 16   LMP  (LMP Unknown)   SpO2 98%   Physical Exam  Constitutional:       General: She is not in acute distress.     Appearance: She is well-developed. She is not ill-appearing.   HENT:      Right Ear: Tympanic membrane normal.      Left Ear: Tympanic membrane normal.      Nose: Congestion present.   Eyes:      General:         Right eye: No discharge.         Left eye: No discharge.      Conjunctiva/sclera: Conjunctivae normal.   Pulmonary:      Effort: Pulmonary effort is normal.      Breath sounds: Normal breath sounds.      Comments: Intermittent wet cough  Musculoskeletal:         General: Normal range of motion.   Skin:     General: Skin is warm and dry.      Capillary Refill: Capillary refill takes less than 2 seconds.   Neurological:      Mental Status: She is alert and oriented to person, place, and time.   Psychiatric:         Mood and Affect: Mood normal.         Behavior: Behavior normal.         Thought Content: Thought content normal.         Judgment: Judgment normal.

## 2024-09-23 ENCOUNTER — PATIENT OUTREACH (OUTPATIENT)
Dept: CARE COORDINATION | Facility: CLINIC | Age: 79
End: 2024-09-23
Payer: COMMERCIAL

## 2024-09-30 ENCOUNTER — HOSPITAL ENCOUNTER (OUTPATIENT)
Dept: MAMMOGRAPHY | Facility: CLINIC | Age: 79
Discharge: HOME | End: 2024-09-30
Attending: FAMILY MEDICINE | Admitting: FAMILY MEDICINE
Payer: COMMERCIAL

## 2024-09-30 DIAGNOSIS — Z12.31 VISIT FOR SCREENING MAMMOGRAM: ICD-10-CM

## 2024-09-30 PROCEDURE — 77063 BREAST TOMOSYNTHESIS BI: CPT

## 2024-10-03 ENCOUNTER — HOSPITAL ENCOUNTER (OUTPATIENT)
Dept: MAMMOGRAPHY | Facility: CLINIC | Age: 79
Discharge: HOME | End: 2024-10-03
Attending: FAMILY MEDICINE
Payer: COMMERCIAL

## 2024-10-03 DIAGNOSIS — R92.8 ABNORMAL MAMMOGRAM: ICD-10-CM

## 2024-10-03 PROCEDURE — 77065 DX MAMMO INCL CAD UNI: CPT | Mod: LT

## 2024-10-03 PROCEDURE — 76642 ULTRASOUND BREAST LIMITED: CPT | Mod: LT

## 2024-10-18 ENCOUNTER — MYC MEDICAL ADVICE (OUTPATIENT)
Dept: FAMILY MEDICINE | Facility: CLINIC | Age: 79
End: 2024-10-18
Payer: COMMERCIAL

## 2024-10-18 NOTE — TELEPHONE ENCOUNTER
Patient Quality Outreach    Patient is due for the following:   Hypertension -  BP check    Next Steps:   Schedule BP check with MA or RN    Type of outreach:    Sent Gemmyo message.    Next Steps:  Reach out within 90 days via Phone.    Max number of attempts reached: No. Will try again in 90 days if patient still on fail list.    Questions for provider review:    None           Antonio Avilez RN

## 2024-10-21 ENCOUNTER — OFFICE VISIT (OUTPATIENT)
Dept: FAMILY MEDICINE | Facility: CLINIC | Age: 79
End: 2024-10-21
Payer: COMMERCIAL

## 2024-10-21 VITALS
DIASTOLIC BLOOD PRESSURE: 81 MMHG | BODY MASS INDEX: 27.55 KG/M2 | WEIGHT: 147 LBS | RESPIRATION RATE: 18 BRPM | TEMPERATURE: 98.5 F | OXYGEN SATURATION: 97 % | HEART RATE: 87 BPM | SYSTOLIC BLOOD PRESSURE: 187 MMHG

## 2024-10-21 DIAGNOSIS — J02.9 SORE THROAT: Primary | ICD-10-CM

## 2024-10-21 DIAGNOSIS — J06.9 VIRAL URI: ICD-10-CM

## 2024-10-21 LAB
DEPRECATED S PYO AG THROAT QL EIA: NEGATIVE
GROUP A STREP BY PCR: NOT DETECTED
SARS-COV-2 RNA RESP QL NAA+PROBE: POSITIVE

## 2024-10-21 PROCEDURE — 87651 STREP A DNA AMP PROBE: CPT | Performed by: PHYSICIAN ASSISTANT

## 2024-10-21 PROCEDURE — 99213 OFFICE O/P EST LOW 20 MIN: CPT | Performed by: PHYSICIAN ASSISTANT

## 2024-10-21 PROCEDURE — 87635 SARS-COV-2 COVID-19 AMP PRB: CPT | Performed by: PHYSICIAN ASSISTANT

## 2024-10-21 RX ORDER — IBANDRONATE SODIUM 150 MG/1
150 TABLET, FILM COATED ORAL
COMMUNITY

## 2024-10-21 NOTE — PROGRESS NOTES
Chief Complaint   Patient presents with    Throat Pain     X2 days      Nasal Congestion     X2 days      Cough     X2 days         ASSESSMENT/PLAN:  Nicole was seen today for throat pain, nasal congestion and cough.    Diagnoses and all orders for this visit:    Sore throat  -     Symptomatic COVID-19 Virus (Coronavirus) by PCR Nose  -     Streptococcus A Rapid Screen w/Reflex to PCR  -     Group A Streptococcus PCR Throat Swab    Viral URI    Strep negative.  Likely viral.  Reassuring exam and vitals  Symptomatic cares and expected length of symptoms discussed at length and outlined in AVS  Return precautions also discussed    Daniel Santamaria PA-C      SUBJECTIVE:  Nicole is a 79 year old female who presents to urgent care with 2 days of sore throat some nasal congestion and minimal cough.  Has had pneumonia before and this does not feel the same.  No shortness of breath, chest pain or crackling sensation.  Her  just had major surgery and is taking care of him.  Wants to make sure she does not have anything that she can give him..    ROS: Pertinent ROS neg other than the symptoms noted above in the HPI.     OBJECTIVE:  BP (!) 187/81 (BP Location: Right arm, Patient Position: Sitting, Cuff Size: Adult Regular)   Pulse 87   Temp 98.5  F (36.9  C) (Oral)   Resp 18   Wt 66.7 kg (147 lb)   LMP  (LMP Unknown)   SpO2 97%   BMI 27.55 kg/m     GENERAL: alert and no distress  EYES: Eyes grossly normal to inspection, PERRL and conjunctivae and sclerae normal  HENT: ear canals and TM's normal, nose and mouth without ulcers or lesions  RESP: lungs clear to auscultation - no rales, rhonchi or wheezes  CV: regular rate and rhythm, normal S1 S2, no S3 or S4, no murmur, click or rub, no peripheral edema     DIAGNOSTICS    No results found for any visits on 10/21/24.     Current Outpatient Medications   Medication Sig Dispense Refill    acyclovir (ZOVIRAX) 5 % external ointment Apply topically 5 times daily 30 g  3    albuterol (PROAIR HFA/PROVENTIL HFA/VENTOLIN HFA) 108 (90 Base) MCG/ACT inhaler Inhale 2 puffs into the lungs every 6 hours as needed for shortness of breath, wheezing or cough 18 g 3    amitriptyline (ELAVIL) 25 MG tablet Take 2 tablets (50 mg) by mouth at bedtime 180 tablet 4    amLODIPine (NORVASC) 5 MG tablet Take 1 tablet (5 mg) by mouth daily 90 tablet 3    atorvastatin (LIPITOR) 20 MG tablet Take 1 tablet (20 mg) by mouth daily 90 tablet 1    Biotin 5000 MCG CAPS Take 1 tablet by mouth daily      cholecalciferol, vitamin D3, 1,000 unit tablet [CHOLECALCIFEROL, VITAMIN D3, 1,000 UNIT TABLET] Take 2,000 Units by mouth daily.       estradiol-norethindrone (ACTIVELLA) 1-0.5 MG tablet Take 1 tablet by mouth daily 84 tablet 4    fluticasone (ARNUITY ELLIPTA) 200 MCG/ACT inhaler Inhale 1 puff into the lungs daily 11 each 1    metoprolol succinate ER (TOPROL XL) 25 MG 24 hr tablet Take 2 tablets (50 mg) by mouth daily 180 tablet 4    pantoprazole (PROTONIX) 20 MG EC tablet Take 20 mg by mouth daily      IBANdronate (BONIVA) 150 MG tablet Take 150 mg by mouth every 28 days. (Patient not taking: Reported on 10/21/2024)       No current facility-administered medications for this visit.      Patient Active Problem List   Diagnosis    Hypertension    Lower Back Pain    Esophageal Reflux    Renal Cyst    Fibromyalgia    Taking Female Hormones For Postmenopausal HRT    Allergic Rhinitis    Lumbar radiculopathy    Intrinsic eczema    Serum Enzyme Levels - Lipase Elevated    Osteoporosis Senile    Herniated Disc (L4 - L5) Right    Mixed hyperlipidemia    Selective deficiency of IgG (H)    Arthralgias In Multiple Sites    Temperature Intolerance To Cold   (Consistent)    Chronic Pain    Drug allergy    Guerrero's neuroma of left foot    Anxiety    Primary insomnia    Recurrent cold sores    Stage 3a chronic kidney disease (H)    Mild persistent asthma without complication    Polyp of duodenum    History of colonic polyps     Contact dermatitis and other eczema, due to unspecified cause    Bilateral plantar fasciitis    Benign neoplasm of transverse colon    Mixed hearing loss, bilateral      Past Medical History:   Diagnosis Date    Arthritis     Back pain     Chronic pain     Depressive disorder     Eczema     Fibromyalgia     Hypertension     Mild asthma     Nerve pain     Osteoporosis     Thyroid disease 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 Wildomar    RELEASE TRIGGER FINGER      SHOULDER SURGERY      SINUS SURGERY      TONSILLECTOMY       Family History   Problem Relation Age of Onset    Osteoporosis Mother     Heart Disease Father     Hyperlipidemia Father     Anxiety Disorder Father     Substance Abuse Father     Sleep Apnea Sister     Snoring Sister     Heart Disease Sister     No Known Problems Daughter     No Known Problems Maternal Grandmother     No Known Problems Maternal Grandfather     No Known Problems Paternal Grandmother     No Known Problems Paternal Grandfather     No Known Problems Maternal Aunt     No Known Problems Paternal Aunt     Hypertension Brother     Depression Sister     Mental Illness Brother     Hereditary Breast and Ovarian Cancer Syndrome No family hx of     Breast Cancer No family hx of     Cancer No family hx of     Colon Cancer No family hx of     Endometrial Cancer No family hx of     Ovarian Cancer No family hx of      Social History     Tobacco Use    Smoking status: Never    Smokeless tobacco: Never   Substance Use Topics    Alcohol use: Yes     Alcohol/week: 6.0 standard drinks of alcohol     Comment: Alcoholic Drinks/day: hard cider              The plan of care was discussed with the patient. They understand and agree with the course of treatment prescribed. A printed summary was given including instructions and medications.  The use of  [email or web address] dictation services may have been used to construct the content in this note; any grammatical or spelling errors are non-intentional. Please contact the author of this note directly if you are in need of any clarification.

## 2024-10-22 ENCOUNTER — NURSE TRIAGE (OUTPATIENT)
Dept: NURSING | Facility: CLINIC | Age: 79
End: 2024-10-22
Payer: COMMERCIAL

## 2024-10-22 DIAGNOSIS — U07.1 INFECTION DUE TO 2019 NOVEL CORONAVIRUS: Primary | ICD-10-CM

## 2024-10-22 NOTE — TELEPHONE ENCOUNTER
Coronavirus (COVID-19) Notification    Caller Name (Patient, parent, daughter/son, grandparent, etc)   Patient    Reason for call  Notify of Positive Coronavirus (COVID-19) lab results, assess symptoms,  review [facility] recommendations    Lab Result    Lab test:  2019-nCoV rRt-PCR or SARS-CoV-2 PCR    Oropharyngeal AND/OR nasopharyngeal swabs is POSITIVE for 2019-nCoV RNA/SARS-COV-2 PCR (COVID-19 virus)      Gather patient reported symptoms   Assessment   Current Symptoms at time of phone call, reported by patient: (if no symptoms, document: No symptoms]  Sore Throat, Congestion, cough and now temperature    Date of symptom(s) onset (if applicable)  10-20-24     If at time of call, Patients symptoms have worsened, the Patient should contact 911 or have someone drive them to Emergency Dept promptly:    If Patient calling 911, inform 911 personal that you have tested positive for the Coronavirus (COVID-19).  Place mask on and await 911 to arrive.  If Emergency Dept, If possible, please have another adult drive you to the Emergency Dept but you need to wear mask when in contact with other people.      Treatment Options:   Is patient interested in discussing COVID treatment? yyes       Review information with Patient    Your result was positive. This means you have COVID-19 (coronavirus).    How can I protect others?    These guidelines are for isolating before returning to work, school or .    If you DO have symptoms  Stay home and away from others   For at least 5 days after your symptoms started, AND  You are fever free for 24 hours (with no medicine that reduces fever), AND  Your other symptoms are better  Wear a mask for 10 full days anytime you are around others    If you DON'T have symptoms  Stay home and away from others for at least 5 days after your positive test  Wear a mask for 10 full days anytime you are around others    There may be different guidelines for healthcare facilities.  Please  check with the specific sites before arriving.    If you have been told by a doctor that you were severely ill with COVID-19 or are immunocompromised, you should isolate for at least 10 days.    You should not go back to work until you meet the guidelines above for ending your home isolation. You don't need to be retested for COVID-19 before going back to work--studies show that you won't spread the virus if it's been at least 10 days since your symptoms started (or 20 days, if you have a weak immune system).    Employers, schools, and daycares: This is an official notice for this person's medical guidelines for returning in-person.  They must meet the above guidelines before going back to work, school or  in person.    You will receive a positive COVID-19 letter via BATS or the mail soon with additional self-care information.    Would you like me to review some of that information with you now?  No    If you were tested for an upcoming procedure, please contact your provider for next steps.    Adry Owens

## 2024-10-22 NOTE — TELEPHONE ENCOUNTER
Nurse Triage SBAR    Is this a 2nd Level Triage? YES, LICENSED PRACTITIONER REVIEW IS REQUIRED    Situation:   Telephone call into the clinic, on 10/22/24, to report a positive COVID-19 test with current COVID-19 symptoms    Also see office visit notes, from 10/21/24, regarding throat pain and congestion    Background:   Hx of chronic pain, fibromyalgia, allergic rhinitis, mild persistent asthma, GERD, HLD, HTN, osteoporosis, CKD3a, anxiety, insomnia, and selective deficiency of IgG    Assessment:   Patient tested positive for COVID-19 via in-house lab test on 10/21/24-see office visit notes from 10/21/24    Current COVID-19 symptoms started on Sunday, 10/20/24    Current COVID-19 symptoms:  Throat pain, nasal congestion, cough (with occasional clear/yellow phlegm), headache (rated as 3 out of ten), fatigue, earache, slight runny nose, fever of 100.4    Feels worse than yesterday    Denies chest pain, trouble breathing, chills, loss of smell or taste, muscle pain, dizziness, lightheadedness, vomiting, diarrhea, nausea, or dysuria    Patient is taking amlodipine (med list #2), which requires a licensed practitioner to discuss treatment with Paxlovid    Also taking Atorvastatin (med list #3)-(Lipitor)   Instruct patient to stop atorvastatin while taking Paxlovid and restart atorvastatin 1 day after the completion of Paxlovid.     Also taking ethinyl estradiol/ethinyl estradiol containing meds-med list #3     Most Common: Apri, Aviane, Ortho Cyclen, Ortho Tri Cyclen, Sprintec, Tri-Sprintec, Cryselle,    Instruct patient to use a back-up method of birth control or abstain from intercourse while on Paxlovid until both one month after completion of Paxlovid and a new pill pack starts.     Also see recent lab results notes below    Protocol Recommended Disposition:   Discuss With PCP And Callback By Nurse Within 1 Hour    Recommendation:  Gave care advice. Recommended that the patient meet with a virtual provider to  discuss Paxlovid treatment for current COVID-19 infection due to use of amlodipine.    Patient verbalized understanding and agrees with the plan.    Writer assisted the patient with scheduling a telephone visit, with a provider, today, Tuesday, 10/22/24, at 6:00 pm, to discuss Paxlovid treatment.    Patient would like a Paxlovid prescription to be sent to the [facility] in [place], if appropriate.    Denies other questions or concerns at this time.    Routed to provider    Does the patient meet one of the following criteria for ADS visit consideration? 16+ years old, with an [facility] PCP     TIP  Providers, please consider if this condition is appropriate for management at one of our Acute and Diagnostic Services sites.     If patient is a good candidate, please use dotphrase <dot>triageresponse and select Refer to ADS to document.     Lab Results   Component Value Date    ALT 18 05/08/2024     AST   Date Value Ref Range Status   05/08/2024 23 0 - 45 U/L Final     Comment:     Reference intervals for this test were updated on 6/12/2023 to more accurately reflect our healthy population. There may be differences in the flagging of prior results with similar values performed with this method. Interpretation of those prior results can be made in the context of the updated reference intervals.     Lab Results   Component Value Date    ALKPHOS 69 05/08/2024     Recent Labs   Lab Test 05/08/24  0924 07/21/23  1030 09/28/22  0954 05/09/22  0915 10/28/21  0848 07/29/19  1434 12/05/18  1413   BILITOTAL 0.3 0.4 0.4 0.5 0.5   < > 0.3   DBIL  --   --  <0.20  --   --   --  0.1    < > = values in this interval not displayed.     GFR Estimate   Date Value Ref Range Status   05/08/2024 51 (L) >60 mL/min/1.73m2 Final   07/21/2023 61 >60 mL/min/1.73m2 Final   09/28/2022 55 (L) >60 mL/min/1.73m2 Final     Comment:     Effective December 21, 2021 eGFRcr in adults is calculated using the 2021 CKD-EPI creatinine equation which includes  age and gender (Nubia lowe al., [place], DOI: 10.1056/BRGPbv1285491)   05/10/2021 58 (L) >60 mL/min/1.73m2 Final   10/08/2020 48 (L) >60 mL/min/1.73m2 Final   08/13/2020 53 (L) >60 mL/min/1.73m2 Final     GFR, ESTIMATED POCT   Date Value Ref Range Status   06/19/2023 51 (L) >60 mL/min/1.73m2 Final       Reason for Disposition   HIGH RISK patient (e.g., weak immune system, age > 64 years, obesity with BMI of 30 or higher, pregnant, chronic lung disease or other chronic medical condition) and COVID symptoms (e.g., cough, fever)  (Exceptions: Already seen by doctor or NP/PA and no new or worsening symptoms.)    Additional Information   Negative: SEVERE difficulty breathing (e.g., struggling for each breath, speaks in single words)   Negative: Difficult to awaken or acting confused (e.g., disoriented, slurred speech)   Negative: Bluish (or gray) lips or face now   Negative: Shock suspected (e.g., cold/pale/clammy skin, too weak to stand, low BP, rapid pulse)   Negative: Sounds like a life-threatening emergency to the triager   Negative: Diagnosed or suspected COVID-19 and symptoms lasting 3 or more weeks   Negative: COVID-19 exposure and no symptoms   Negative: COVID-19 vaccine reaction suspected (e.g., fever, headache, muscle aches) occurring 1 to 3 days after getting vaccine   Negative: COVID-19 vaccine, questions about   Negative: Lives with someone known to have influenza (flu test positive) and flu-like symptoms (e.g., cough, runny nose, sore throat, SOB; with or without fever)   Negative: Possible COVID-19 symptoms and triager concerned about severity of symptoms or other causes   Negative: COVID-19 and breastfeeding, questions about   Negative: SEVERE or constant chest pain or pressure  (Exception: Mild central chest pain, present only when coughing.)   Negative: MODERATE difficulty breathing (e.g., speaks in phrases, SOB even at rest, pulse 100-120)   Negative: Headache and stiff neck (can't touch chin to chest)    "Negative: Oxygen level (e.g., pulse oximetry) 90% or lower   Negative: Chest pain or pressure  (Exception: MILD central chest pain, present only when coughing.)   Negative: Drinking very little and dehydration suspected (e.g., no urine > 12 hours, very dry mouth, very lightheaded)   Negative: Patient sounds very sick or weak to the triager   Negative: MILD difficulty breathing (e.g., minimal/no SOB at rest, SOB with walking, pulse <100)   Negative: Fever > 103 F (39.4 C)   Negative: Fever > 101 F (38.3 C) and over 60 years of age   Negative: Fever > 100.0 F (37.8 C) and bedridden (e.g., CVA, chronic illness, recovering from surgery)    Answer Assessment - Initial Assessment Questions  1. COVID-19 DIAGNOSIS: \"How do you know that you have COVID?\" (e.g., positive lab test or self-test, diagnosed by doctor or NP/PA, symptoms after exposure).        Tested positive for COVID-19 on 10/21/24, Monday    2. COVID-19 EXPOSURE: \"Was there any known exposure to COVID before the symptoms began?\" CDC Definition of close contact: within 6 feet (2 meters) for a total of 15 minutes or more over a 24-hour period.         Unknown- was in hospital recently    3. ONSET: \"When did the COVID-19 symptoms start?\"         Sunday, 10/20/24    4. WORST SYMPTOM: \"What is your worst symptom?\" (e.g., cough, fever, shortness of breath, muscle aches)          Headache and sore throat    5. COUGH: \"Do you have a cough?\" If Yes, ask: \"How bad is the cough?\"          Cough-occasional clear/yellow phlegm    6. FEVER: \"Do you have a fever?\" If Yes, ask: \"What is your temperature, how was it measured, and when did it start?\"        100.4    7. RESPIRATORY STATUS: \"Describe your breathing?\" (e.g., normal; shortness of breath, wheezing, unable to speak)         None noted      8. BETTER-SAME-WORSE: \"Are you getting better, staying the same or getting worse compared to yesterday?\"  If getting worse, ask, \"In what way?\"        worse    9. OTHER " "SYMPTOMS: \"Do you have any other symptoms?\"  (e.g., chills, fatigue, headache, loss of smell or taste, muscle pain, sore throat)        Denies chest pain, trouble breathing, chills, loss of smell or taste, muscle pain, dizziness, lightheadedness, vomiting, diarrhea, nausea, dysuria    Headache (rated as 3 out of ten), fatigue, cough, sore throat, earache, sinus congestion, slight runny nose    10. HIGH RISK DISEASE: \"Do you have any chronic medical problems?\" (e.g., asthma, heart or lung disease, weak immune system, obesity, etc.)          Asthma , HTN, HLD, CKD3a    11. VACCINE: \"Have you had the COVID-19 vaccine?\" If Yes, ask: \"Which one, how many shots, when did you get it?\"          See immunization list    12. PREGNANCY: \"Is there any chance you are pregnant?\" \"When was your last menstrual period?\"            N/A    13. O2 SATURATION MONITOR:  \"Do you use an oxygen saturation monitor (pulse oximeter) at home?\" If Yes, ask \"What is your reading (oxygen level) today?\" \"What is your usual oxygen saturation reading?\" (e.g., 95%)    Protocols used: Coronavirus (COVID-19) Diagnosed or [id number]    Charlene Barron RN, BSN  [facility]    "

## 2024-10-22 NOTE — TELEPHONE ENCOUNTER
Okay to receive Paxlovid without additional provider visit since patient just had visit with urgent care yesterday, she will just need to take half dose of amlodipine (1/2 of 5 mg tab) daily while taking Paxlovid, then resume 1 full tab daily.  VJ

## 2024-10-22 NOTE — TELEPHONE ENCOUNTER
Pt calling back as she states she was told to call back,    No RN available, please call back for treatment options

## 2024-10-22 NOTE — TELEPHONE ENCOUNTER
Unable to place Paxlovid order with RN protocol due to medications. Attempted to huddle with provider, but provider not available. Routing to provider to order medication.    Dee Hernández RN  [facility]

## 2024-10-22 NOTE — TELEPHONE ENCOUNTER
Prescription sent for Paxlovid.  Please instruct patient to take one half tab of her amlodipine daily and to stop atorvastatin while taking Paxlovid.  VJ

## 2024-10-22 NOTE — TELEPHONE ENCOUNTER
See PCP's responses, on 10/22/24, regarding Paxlovid prescription, for current COVID-19 infection treatment.    Writer called the patient and relayed the above PCP's recommendations regarding Paxlovid and current medications.    Patient verbalized understanding and agrees with the plan.    Patient stated that the [facility] does not have the Paxlovid in stock currently, so the patient is requesting that the Paxlovid prescription be sent to the [facility] in [place].    Writer assisted the patient with cancelling the telephone appointment for this evening, 10/22/24, at 6:00 pm to discuss Paxlovid treatment with a virtual provider, per PCP recommendation.    Writer also sent the current Paxlovid prescription to the [facility] in [place] as requested by the patient.    Denies other questions or concerns at this time.    Charlene Barron RN, BSN  [facility]

## 2024-10-24 DIAGNOSIS — E78.2 MIXED HYPERLIPIDEMIA: ICD-10-CM

## 2024-10-24 RX ORDER — ATORVASTATIN CALCIUM 20 MG/1
20 TABLET, FILM COATED ORAL DAILY
Qty: 90 TABLET | Refills: 3 | Status: SHIPPED | OUTPATIENT
Start: 2024-10-24

## 2024-12-24 ENCOUNTER — OFFICE VISIT (OUTPATIENT)
Dept: FAMILY MEDICINE | Facility: CLINIC | Age: 79
End: 2024-12-24
Payer: COMMERCIAL

## 2024-12-24 VITALS
HEIGHT: 61 IN | DIASTOLIC BLOOD PRESSURE: 68 MMHG | BODY MASS INDEX: 27.19 KG/M2 | TEMPERATURE: 98.1 F | HEART RATE: 99 BPM | WEIGHT: 144 LBS | SYSTOLIC BLOOD PRESSURE: 138 MMHG | OXYGEN SATURATION: 96 % | RESPIRATION RATE: 20 BRPM

## 2024-12-24 DIAGNOSIS — N89.8 VAGINAL DISCHARGE: Primary | ICD-10-CM

## 2024-12-24 DIAGNOSIS — N18.31 STAGE 3A CHRONIC KIDNEY DISEASE (H): ICD-10-CM

## 2024-12-24 DIAGNOSIS — N95.0 POSTMENOPAUSAL VAGINAL BLEEDING: ICD-10-CM

## 2024-12-24 DIAGNOSIS — N36.2 URETHRAL CARUNCLE: ICD-10-CM

## 2024-12-24 LAB
CREAT UR-MCNC: 101 MG/DL
MICROALBUMIN UR-MCNC: 24.7 MG/L
MICROALBUMIN/CREAT UR: 24.46 MG/G CR (ref 0–25)

## 2024-12-24 PROCEDURE — 82043 UR ALBUMIN QUANTITATIVE: CPT | Performed by: FAMILY MEDICINE

## 2024-12-24 PROCEDURE — 82570 ASSAY OF URINE CREATININE: CPT | Performed by: FAMILY MEDICINE

## 2024-12-24 PROCEDURE — 99213 OFFICE O/P EST LOW 20 MIN: CPT | Performed by: FAMILY MEDICINE

## 2024-12-24 ASSESSMENT — PAIN SCALES - GENERAL: PAINLEVEL_OUTOF10: NO PAIN (0)

## 2024-12-24 ASSESSMENT — ASTHMA QUESTIONNAIRES
QUESTION_2 LAST FOUR WEEKS HOW OFTEN HAVE YOU HAD SHORTNESS OF BREATH: NOT AT ALL
QUESTION_4 LAST FOUR WEEKS HOW OFTEN HAVE YOU USED YOUR RESCUE INHALER OR NEBULIZER MEDICATION (SUCH AS ALBUTEROL): NOT AT ALL
QUESTION_3 LAST FOUR WEEKS HOW OFTEN DID YOUR ASTHMA SYMPTOMS (WHEEZING, COUGHING, SHORTNESS OF BREATH, CHEST TIGHTNESS OR PAIN) WAKE YOU UP AT NIGHT OR EARLIER THAN USUAL IN THE MORNING: NOT AT ALL
QUESTION_5 LAST FOUR WEEKS HOW WOULD YOU RATE YOUR ASTHMA CONTROL: COMPLETELY CONTROLLED
ACT_TOTALSCORE: 25
ACT_TOTALSCORE: 25
QUESTION_1 LAST FOUR WEEKS HOW MUCH OF THE TIME DID YOUR ASTHMA KEEP YOU FROM GETTING AS MUCH DONE AT WORK, SCHOOL OR AT HOME: NONE OF THE TIME

## 2024-12-24 NOTE — PATIENT INSTRUCTIONS
While your exam is very reassuring, I think we should do an ultrasound to ensure there are no abnormal findings of your uterus.  I have placed an order.

## 2024-12-24 NOTE — PROGRESS NOTES
"  Assessment & Plan     Vaginal discharge  Postmenopausal vaginal bleeding  Urethral caruncle  Chronic vaginal discharge unchanged, but with feet blood it is unclear whether this is vaginal in nature or from urethral carbuncle.  Pelvic examination including speculum exam without concerning findings.  With cervical os atrophic and closed, uterine source is unlikely, however she is also taking hormone replacement therapy and therefore I feel it is prudent to complete pelvic ultrasound to ensure we are not seeing any abnormalities of her uterine lining.  Order placed.  If unremarkable, it is likely her symptoms are from urethral carbuncle, in which case we can monitor.  She states understanding.  - US Pelvic Complete with Transvaginal; Future    Stage 3a chronic kidney disease (H)  Will obtain urine microalbumin today.  - Albumin Random Urine Quantitative with Creat Ratio; Future  - Albumin Random Urine Quantitative with Creat Ratio          BMI  Estimated body mass index is 26.99 kg/m  as calculated from the following:    Height as of this encounter: 1.556 m (5' 1.25\").    Weight as of this encounter: 65.3 kg (144 lb).             Yoshi Rivera is a 79 year old, presenting for the following health issues:  Vaginal Problem (Had a discharge with slight pink, same as last time.)        12/24/2024    10:45 AM   Additional Questions   Roomed by NAHEED Rodriguez   Accompanied by Self     Seen in clinic today because she is some pink to brown vaginal discharge on a daily basis, noting it in her clothing.  She is confident it is not in her urine or in her stools.  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  She has not had any recent intercourse.  Is not having any pelvic pain, cramping, or fullness.  Taking hormone replacement therapy for many years due to osteoporosis.  Notes significant recent stressors as her  had perforation of tumors in his stomach, " "required removal of nearly all of his stomach, currently has a feeding tube in place.  It has been a very stressful period of time.  [place] but is still waiting  stability to make arrangements.    History of Present Illness       CKD: She uses over the counter pain medication, including tylenol, a few times a week.    Reason for visit:  Check kidney function  vaginal discharge    She eats 2-3 servings of fruits and vegetables daily.She consumes 2 sweetened beverage(s) daily.She exercises with enough effort to increase her heart rate 20 to 29 minutes per day.  She exercises with enough effort to increase her heart rate 5 days per week.   She is taking medications regularly.                     Objective    /68   Pulse 99   Temp 98.1  F (36.7  C) (Temporal)   Resp 20   Ht 1.556 m (5' 1.25\")   Wt 65.3 kg (144 lb)   LMP  (LMP Unknown)   SpO2 96%   BMI 26.99 kg/m    Body mass index is 26.99 kg/m .  Physical Exam   Alert and very pleasant.  Normal external female genitalia, no significant external atrophy noted.  She has a caruncle present at the urethral meatus.  Speculum exam she is very healthy appearing vaginal and cervical mucosa.  There is atrophy of cervical mucosa and it is difficult to identify her cervical os-- it appears to be atrophic and closed.            Signed Electronically by: Shreya Diaz MD    "

## 2024-12-30 ENCOUNTER — HOSPITAL ENCOUNTER (OUTPATIENT)
Dept: ULTRASOUND IMAGING | Facility: CLINIC | Age: 79
Discharge: HOME | End: 2024-12-30
Attending: FAMILY MEDICINE | Admitting: FAMILY MEDICINE
Payer: COMMERCIAL

## 2024-12-30 DIAGNOSIS — N95.0 POSTMENOPAUSAL VAGINAL BLEEDING: ICD-10-CM

## 2024-12-30 DIAGNOSIS — N89.8 VAGINAL DISCHARGE: ICD-10-CM

## 2024-12-30 PROCEDURE — 76830 TRANSVAGINAL US NON-OB: CPT

## 2025-01-02 NOTE — RESULT ENCOUNTER NOTE
Your ultrasound indicates some material in your cervix-- it is not clear if this is of concern, but it could be related to your symptoms of bleeding and requires further evaluation.  I recommend scheduling a visit with gynecology for further evaluation-- if you do not have a gynecologist, I recommend [facility] (formerly [facility]).

## 2025-01-03 ENCOUNTER — MYC MEDICAL ADVICE (OUTPATIENT)
Dept: FAMILY MEDICINE | Facility: CLINIC | Age: 80
End: 2025-01-03
Payer: COMMERCIAL

## 2025-01-03 DIAGNOSIS — R93.5 ABNORMAL ULTRASOUND OF UTERUS: ICD-10-CM

## 2025-01-03 DIAGNOSIS — N95.0 POSTMENOPAUSAL VAGINAL BLEEDING: ICD-10-CM

## 2025-01-03 DIAGNOSIS — N89.8 VAGINAL DISCHARGE: Primary | ICD-10-CM

## 2025-01-06 NOTE — TELEPHONE ENCOUNTER
I placed a formal referral to [email or web address].  Please fax.  VJ

## 2025-01-11 ENCOUNTER — HEALTH MAINTENANCE LETTER (OUTPATIENT)
Age: 80
End: 2025-01-11

## 2025-01-30 ENCOUNTER — LAB REQUISITION (OUTPATIENT)
Dept: LAB | Facility: CLINIC | Age: 80
End: 2025-01-30
Payer: COMMERCIAL

## 2025-01-30 DIAGNOSIS — N95.0 POSTMENOPAUSAL BLEEDING: ICD-10-CM

## 2025-01-30 PROCEDURE — 88305 TISSUE EXAM BY PATHOLOGIST: CPT | Mod: TC,ORL | Performed by: OBSTETRICS & GYNECOLOGY

## 2025-02-03 LAB
PATH REPORT.COMMENTS IMP SPEC: NORMAL
PATH REPORT.FINAL DX SPEC: NORMAL
PATH REPORT.GROSS SPEC: NORMAL
PATH REPORT.MICROSCOPIC SPEC OTHER STN: NORMAL
PATH REPORT.RELEVANT HX SPEC: NORMAL
PHOTO IMAGE: NORMAL

## 2025-02-03 PROCEDURE — 88305 TISSUE EXAM BY PATHOLOGIST: CPT | Mod: 26 | Performed by: PATHOLOGY

## 2025-05-11 PROBLEM — L25.9 CONTACT DERMATITIS AND OTHER ECZEMA, DUE TO UNSPECIFIED CAUSE: Status: RESOLVED | Noted: 2021-11-11 | Resolved: 2025-05-11

## 2025-05-13 SDOH — HEALTH STABILITY: PHYSICAL HEALTH: ON AVERAGE, HOW MANY MINUTES DO YOU ENGAGE IN EXERCISE AT THIS LEVEL?: PATIENT DECLINED

## 2025-05-13 SDOH — HEALTH STABILITY: PHYSICAL HEALTH
ON AVERAGE, HOW MANY DAYS PER WEEK DO YOU ENGAGE IN MODERATE TO STRENUOUS EXERCISE (LIKE A BRISK WALK)?: PATIENT DECLINED

## 2025-05-13 ASSESSMENT — SOCIAL DETERMINANTS OF HEALTH (SDOH): HOW OFTEN DO YOU GET TOGETHER WITH FRIENDS OR RELATIVES?: PATIENT DECLINED

## 2025-05-13 ASSESSMENT — ASTHMA QUESTIONNAIRES
QUESTION_1 LAST FOUR WEEKS HOW MUCH OF THE TIME DID YOUR ASTHMA KEEP YOU FROM GETTING AS MUCH DONE AT WORK, SCHOOL OR AT HOME: NONE OF THE TIME
QUESTION_2 LAST FOUR WEEKS HOW OFTEN HAVE YOU HAD SHORTNESS OF BREATH: ONCE OR TWICE A WEEK
ACT_TOTALSCORE: 19
QUESTION_5 LAST FOUR WEEKS HOW WOULD YOU RATE YOUR ASTHMA CONTROL: SOMEWHAT CONTROLLED
QUESTION_3 LAST FOUR WEEKS HOW OFTEN DID YOUR ASTHMA SYMPTOMS (WHEEZING, COUGHING, SHORTNESS OF BREATH, CHEST TIGHTNESS OR PAIN) WAKE YOU UP AT NIGHT OR EARLIER THAN USUAL IN THE MORNING: NOT AT ALL
QUESTION_4 LAST FOUR WEEKS HOW OFTEN HAVE YOU USED YOUR RESCUE INHALER OR NEBULIZER MEDICATION (SUCH AS ALBUTEROL): ONE OR TWO TIMES PER DAY

## 2025-05-15 ENCOUNTER — OFFICE VISIT (OUTPATIENT)
Dept: FAMILY MEDICINE | Facility: CLINIC | Age: 80
End: 2025-05-15
Attending: FAMILY MEDICINE
Payer: COMMERCIAL

## 2025-05-15 ENCOUNTER — RESULTS FOLLOW-UP (OUTPATIENT)
Dept: FAMILY MEDICINE | Facility: CLINIC | Age: 80
End: 2025-05-15

## 2025-05-15 VITALS
BODY MASS INDEX: 27.4 KG/M2 | HEART RATE: 76 BPM | TEMPERATURE: 98.1 F | SYSTOLIC BLOOD PRESSURE: 136 MMHG | WEIGHT: 145.1 LBS | DIASTOLIC BLOOD PRESSURE: 68 MMHG | HEIGHT: 61 IN | OXYGEN SATURATION: 98 % | RESPIRATION RATE: 16 BRPM

## 2025-05-15 DIAGNOSIS — E78.2 MIXED HYPERLIPIDEMIA: ICD-10-CM

## 2025-05-15 DIAGNOSIS — H90.6 MIXED HEARING LOSS, BILATERAL: ICD-10-CM

## 2025-05-15 DIAGNOSIS — I10 ESSENTIAL HYPERTENSION: ICD-10-CM

## 2025-05-15 DIAGNOSIS — L20.84 INTRINSIC ECZEMA: ICD-10-CM

## 2025-05-15 DIAGNOSIS — M79.662 PAIN OF LEFT CALF: ICD-10-CM

## 2025-05-15 DIAGNOSIS — Z79.890 NEED FOR PROPHYLACTIC HORMONE REPLACEMENT THERAPY (POSTMENOPAUSAL): ICD-10-CM

## 2025-05-15 DIAGNOSIS — F41.9 ANXIETY: ICD-10-CM

## 2025-05-15 DIAGNOSIS — M81.0 SENILE OSTEOPOROSIS: ICD-10-CM

## 2025-05-15 DIAGNOSIS — Z79.890 CURRENT LONG-TERM USE OF POSTMENOPAUSAL HORMONE REPLACEMENT THERAPY: ICD-10-CM

## 2025-05-15 DIAGNOSIS — M54.16 LUMBAR RADICULOPATHY: ICD-10-CM

## 2025-05-15 DIAGNOSIS — J45.30 MILD PERSISTENT ASTHMA WITHOUT COMPLICATION: ICD-10-CM

## 2025-05-15 DIAGNOSIS — F51.01 PRIMARY INSOMNIA: ICD-10-CM

## 2025-05-15 DIAGNOSIS — K21.9 GASTROESOPHAGEAL REFLUX DISEASE WITHOUT ESOPHAGITIS: ICD-10-CM

## 2025-05-15 DIAGNOSIS — M79.7 FIBROMYALGIA: ICD-10-CM

## 2025-05-15 DIAGNOSIS — N95.0 POSTMENOPAUSAL BLEEDING: ICD-10-CM

## 2025-05-15 DIAGNOSIS — N18.31 STAGE 3A CHRONIC KIDNEY DISEASE (H): ICD-10-CM

## 2025-05-15 DIAGNOSIS — B00.1 RECURRENT COLD SORES: ICD-10-CM

## 2025-05-15 DIAGNOSIS — Z00.00 MEDICARE ANNUAL WELLNESS VISIT, SUBSEQUENT: Primary | ICD-10-CM

## 2025-05-15 DIAGNOSIS — D80.3 SELECTIVE DEFICIENCY OF IGG (H): ICD-10-CM

## 2025-05-15 LAB
ALBUMIN SERPL BCG-MCNC: 4.7 G/DL (ref 3.5–5.2)
ALP SERPL-CCNC: 66 U/L (ref 40–150)
ALT SERPL W P-5'-P-CCNC: 15 U/L (ref 0–50)
ANION GAP SERPL CALCULATED.3IONS-SCNC: 12 MMOL/L (ref 7–15)
AST SERPL W P-5'-P-CCNC: 23 U/L (ref 0–45)
BILIRUB SERPL-MCNC: 0.3 MG/DL
BUN SERPL-MCNC: 13.6 MG/DL (ref 8–23)
CALCIUM SERPL-MCNC: 9.2 MG/DL (ref 8.8–10.4)
CHLORIDE SERPL-SCNC: 105 MMOL/L (ref 98–107)
CHOLEST SERPL-MCNC: 151 MG/DL
CREAT SERPL-MCNC: 1.02 MG/DL (ref 0.51–0.95)
EGFRCR SERPLBLD CKD-EPI 2021: 56 ML/MIN/1.73M2
ERYTHROCYTE [DISTWIDTH] IN BLOOD BY AUTOMATED COUNT: 14.3 % (ref 10–15)
FASTING STATUS PATIENT QL REPORTED: YES
FASTING STATUS PATIENT QL REPORTED: YES
GLUCOSE SERPL-MCNC: 99 MG/DL (ref 70–99)
HCO3 SERPL-SCNC: 24 MMOL/L (ref 22–29)
HCT VFR BLD AUTO: 40.8 % (ref 35–47)
HDLC SERPL-MCNC: 53 MG/DL
HGB BLD-MCNC: 13.7 G/DL (ref 11.7–15.7)
LDLC SERPL CALC-MCNC: 85 MG/DL
MAGNESIUM SERPL-MCNC: 2.7 MG/DL (ref 1.7–2.3)
MCH RBC QN AUTO: 31.4 PG (ref 26.5–33)
MCHC RBC AUTO-ENTMCNC: 33.6 G/DL (ref 31.5–36.5)
MCV RBC AUTO: 94 FL (ref 78–100)
NONHDLC SERPL-MCNC: 98 MG/DL
PLATELET # BLD AUTO: 240 10E3/UL (ref 150–450)
POTASSIUM SERPL-SCNC: 4.3 MMOL/L (ref 3.4–5.3)
PROT SERPL-MCNC: 7.3 G/DL (ref 6.4–8.3)
PTH-INTACT SERPL-MCNC: 57 PG/ML (ref 15–65)
RBC # BLD AUTO: 4.36 10E6/UL (ref 3.8–5.2)
SODIUM SERPL-SCNC: 141 MMOL/L (ref 135–145)
TRIGL SERPL-MCNC: 64 MG/DL
VIT D+METAB SERPL-MCNC: 43 NG/ML (ref 20–50)
WBC # BLD AUTO: 9.6 10E3/UL (ref 4–11)

## 2025-05-15 RX ORDER — PANTOPRAZOLE SODIUM 20 MG/1
20 TABLET, DELAYED RELEASE ORAL DAILY
Status: CANCELLED | OUTPATIENT
Start: 2025-05-15

## 2025-05-15 RX ORDER — ESTRADIOL AND NORETHINDRONE ACETATE 1; .5 MG/1; MG/1
1 TABLET ORAL DAILY
Qty: 84 TABLET | Refills: 4 | Status: SHIPPED | OUTPATIENT
Start: 2025-05-15

## 2025-05-15 RX ORDER — FLUTICASONE FUROATE 200 UG/1
1 POWDER RESPIRATORY (INHALATION) DAILY
Qty: 11 EACH | Refills: 1 | Status: SHIPPED | OUTPATIENT
Start: 2025-05-15

## 2025-05-15 RX ORDER — ACYCLOVIR 50 MG/G
OINTMENT TOPICAL
Qty: 30 G | Refills: 3 | Status: SHIPPED | OUTPATIENT
Start: 2025-05-15

## 2025-05-15 RX ORDER — ALBUTEROL SULFATE 90 UG/1
2 INHALANT RESPIRATORY (INHALATION) EVERY 6 HOURS PRN
Qty: 18 G | Refills: 3 | Status: SHIPPED | OUTPATIENT
Start: 2025-05-15

## 2025-05-15 RX ORDER — LORATADINE 10 MG/1
TABLET ORAL
COMMUNITY
Start: 2025-03-03

## 2025-05-15 RX ORDER — METOPROLOL SUCCINATE 25 MG/1
50 TABLET, EXTENDED RELEASE ORAL DAILY
Qty: 180 TABLET | Refills: 4 | Status: SHIPPED | OUTPATIENT
Start: 2025-05-15

## 2025-05-15 ASSESSMENT — PAIN SCALES - GENERAL: PAINLEVEL_OUTOF10: NO PAIN (0)

## 2025-05-15 NOTE — PROGRESS NOTES
Preventive Care Visit  [facility]  Shreya Diaz MD, Family Medicine  May 15, 2025      Assessment & Plan     Medicare annual wellness visit, subsequent  At today's visit, we discussed lifestyle interventions to promote self-management and wellness, including maintenance of a healthy weight, healthy diet, regular physical activity and exercise, and falls prevention.  Encourage consideration of 6-month COVID booster, she will receive with  at the pharmacy.  Up-to-date with bone density screening.  Information provided regarding hearing opportunities, she has hearing aids present.    Hypertension  Encouraged efforts at healthy lifestyle habits.  Stable and controlled on amlodipine and metoprolol, continue.  We will check kidney function and electrolytes today.  - Comprehensive metabolic panel; Future  - Comprehensive metabolic panel    Mixed hyperlipidemia  Encouraged efforts at healthy lifestyle habits.  Continue atorvastatin, refills provided.  We will check liver function, fasting lipids.  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  We will reassess levels today.  - IgG subclasses; Future  - IgM; Future  - IgG subclasses  - IgM    Stage 3a chronic kidney disease (H)  Continue to minimize and avoid nephrotoxic agents.  We will check kidney function, will assess for secondary vitamin D deficiency, anemia, hyperparathyroidism.  - Comprehensive metabolic panel; Future  - CBC with platelets; Future  - Parathyroid Hormone Intact; Future  - Lipid panel reflex to direct LDL Fasting; Future  - Comprehensive metabolic panel  - CBC with platelets  - Parathyroid Hormone Intact  - Lipid panel reflex to direct LDL Fasting    Mild persistent asthma without  complication  Overall doing much better, ACT slightly reduced at 19 though on questioning in clinic today she is really using her albuterol inhaler preps 1-2 times per week and is having minimal symptoms besides this, it sounds as though it is in better control than her ACT score is suggested.  Continue Arnuity Ellipta.  Continue loratadine to manage associated allergies.  Notify me with onset visual symptoms or worsening.  - albuterol (PROAIR HFA/PROVENTIL HFA/VENTOLIN HFA) 108 (90 Base) MCG/ACT inhaler; Inhale 2 puffs into the lungs every 6 hours as needed for shortness of breath, wheezing or cough.  - fluticasone (ARNUITY ELLIPTA) 200 MCG/ACT inhaler; Inhale 1 puff into the lungs daily.    Fibromyalgia  Chronic, stable with amitriptyline.    Lumbar radiculopathy  Symptoms controlled with amitriptyline.  Unclear if the left calf spasm could be related to mild left-sided lumbar radiculopathy though no other radiculopathy symptoms present.  Could also be related to lumbar stenosis though nothing clear on exam today, will treat as below.    Mixed hearing loss, bilateral  She has hearing aids bilaterally, feels adequately supported.    Gastroesophageal reflux disease without esophagitis  Stable and controlled on pantoprazole under the direction of GI.  Discussed that I would be comfortable taking over this prescription if stable and going to be continued long-term.  She will clarify dose.    Recurrent cold sores  Stable, topical acyclovir as needed.    Osteoporosis Senile  Encourage weightbearing activities, measures to reduce risk of falls, adequate calcium and vitamin D intake.  She remains on Activella HRT.  - Vitamin D Deficiency; Future  - Vitamin D Deficiency    Primary insomnia  Stable and controlled on amitriptyline which is working well for her, takes 1-2 tabs.  - amitriptyline (ELAVIL) 25 MG tablet; Take 2 tablets (50 mg) by mouth at bedtime.    Current long-term use of postmenopausal hormone replacement  "therapy  Refill provided, this is working well for her.  - estradiol-norethindrone (ACTIVELLA) 1-0.5 MG tablet; Take 1 tablet by mouth daily.    Tachycardia  Stable and controlled.  - metoprolol succinate ER (TOPROL XL) 25 MG 24 hr tablet; Take 2 tablets (50 mg) by mouth daily.    Herpes simplex virus (HSV) infection  Refill provided.  - acyclovir (ZOVIRAX) 5 % external ointment; Apply topically 5 times daily.    Pain of left calf  No specific findings on exam.  Symptoms are primarily related to prolonged seated posture, nothing to suggest underlying vascular etiology.  Could be related to lumbar stenosis or radiculopathy though not clear.  Will look for electrolyte disturbances including magnesium.  Advise magnesium supplement, gentle stretching.  Could give consideration to physical therapy if persisting.  - Magnesium; Future  - Magnesium    Postmenopausal bleeding  Seen and evaluated by gynecology, additional follow-up was not necessary.            BMI  Estimated body mass index is 27.19 kg/m  as calculated from the following:    Height as of this encounter: 1.556 m (5' 1.25\").    Weight as of this encounter: 65.8 kg (145 lb 1.6 oz).       Counseling  Appropriate preventive services were addressed with this patient via screening, questionnaire, or discussion as appropriate for fall prevention, nutrition, physical activity, Tobacco-use cessation, social engagement, weight loss and cognition.  Checklist reviewing preventive services available has been given to the patient.  Reviewed patient's diet, addressing concerns and/or questions.   She is at risk for psychosocial distress and has been provided with information to reduce risk.   Discussed possible causes of fatigue. The patient was provided with written information regarding signs of hearing loss.     The longitudinal plan of care for the diagnosis(es)/condition(s) as documented were addressed during this visit. Due to the added complexity in care, I will " continue to support Nicole in the subsequent management and with ongoing continuity of care.        Subjective   Nicole is a 80 year old, presenting for the following:  Wellness Visit (fasting), Forms (Renewal for disability certificate), and Spasm left calf (Drove 3 days from [place])            HPI      History of Present Illness-  Left Calf Pain  - Off and on pain, primarily severe during long car rides, such as a recent 3-day drive from [place].  - Described as spasm and pain, worst in the left calf, sometimes extending lower or up to the thigh.  - Noticed left foot appearing more purple than the right in the shower, suggesting possible circulation issues.  - No history of blood clots.  - Swelling in ankles, wearing compression socks helps somewhat but not completely.  - Pain has been present for a few years, especially during car rides.  - No lumps or bumps felt in the back of the leg or thigh.    Vaginal Bleeding  - Seen by gynecology, endometrial biopsy performed with benign findings, thought to be related to hormone replacement therapy  - Heavy vaginal bleeding occurred 3 weeks after endometrial biopsy, leading to an emergency room visit.  No concerning findings found, has not recurred.    Asthma  - Not well-controlled in [place] due to blooming, requiring increased use of inhaler.  - Used inhaler once or twice in the last 2 weeks, improving.    Fibromyalgia  - Diagnosed many years ago, described as nerve-related issues.  - Amitriptyline helps with sleep and nerve pain.    Back Pain  - Described as horrible but manageable.  - Different from sciatica, which is not currently present.    Depression  - Family history of depression and suicide.  - Personal stress due to 's health issues and caregiving responsibilities.  -  is status post complete gastrectomy, significant gagging on secretions, struggling with this.  - Does not feel like she would like medication or counseling at this time but  feels she may be getting closer.           Advance Care Planning    Discussed advance care planning with patient; informed AVS has link to [facility].        5/13/2025   General Health   How would you rate your overall physical health? (!) FAIR   Feel stress (tense, anxious, or unable to sleep) Rather much   (!) STRESS CONCERN      5/13/2025   Nutrition   Diet: Regular (no restrictions)         5/13/2025   Exercise   Days per week of moderate/strenous exercise Patient declined   Average minutes spent exercising at this level Patient declined         5/13/2025   Social Factors   Frequency of gathering with friends or relatives Patient declined   Worry food won't last until get money to buy more No   Food not last or not have enough money for food? No   Do you have housing? (Housing is defined as stable permanent housing and does not include staying outside in a car, in a tent, in an abandoned building, in an overnight shelter, or couch-surfing.) Yes   Are you worried about losing your housing? No   Lack of transportation? No   Unable to get utilities (heat,electricity)? No         5/15/2025   Fall Risk   Fallen 2 or more times in the past year? No   Trouble with walking or balance? No          5/13/2025   Activities of Daily Living- Home Safety   Needs help with the following daily activites None of the above   Safety concerns in the home None of the above         5/13/2025   Dental   Dentist two times every year? Yes         5/13/2025   Hearing Screening   Hearing concerns? (!) I FEEL THAT PEOPLE ARE MUMBLING OR NOT SPEAKING CLEARLY.         5/13/2025   Driving Risk Screening   Patient/family members have concerns about driving No         5/13/2025   General Alertness/Fatigue Screening   Have you been more tired than usual lately? (!) YES         5/13/2025   Urinary Incontinence Screening   Bothered by leaking urine in past 6 months No         Today's PHQ-2 Score:       5/15/2025     8:19 AM   PHQ-2 ( 1999  [employer])   Q1: Little interest or pleasure in doing things 1   Q2: Feeling down, depressed or hopeless 1   PHQ-2 Score 2    Q1: Little interest or pleasure in doing things Several days   Q2: Feeling down, depressed or hopeless Several days   PHQ-2 Score 2       Patient-reported           5/13/2025   Substance Use   Alcohol more than 3/day or more than 7/wk No   Do you have a current opioid prescription? No   How severe/bad is pain from 1 to 10? 4/10   Do you use any other substances recreationally? No     Social History     Tobacco Use    Smoking status: Never    Smokeless tobacco: Never   Vaping Use    Vaping status: Never Used   Substance Use Topics    Alcohol use: Yes     Alcohol/week: 6.0 standard drinks of alcohol     Comment: Alcoholic Drinks/day: hard cider    Drug use: Not Currently     Types: Marijuana     Comment: Drug use: very rare           9/30/2024   LAST FHS-7 RESULTS   1st degree relative breast or ovarian cancer No   Any relative bilateral breast cancer No   Any male have breast cancer No   Any ONE woman have BOTH breast AND ovarian cancer No   Any woman with breast cancer before 50yrs No   2 or more relatives with breast AND/OR ovarian cancer No   2 or more relatives with breast AND/OR bowel cancer No        Mammogram Screening - After age 74- determine frequency with patient based on health status, life expectancy and patient goals    ASCVD Risk   The 10-year ASCVD risk score (Samia NGUYEN, et al., 2019) is: 34.5%    Values used to calculate the score:      Age: 80 years      Sex: Female      Is Non- : No      Diabetic: No      Tobacco smoker: No      Systolic Blood Pressure: 136 mmHg      Is BP treated: Yes      HDL Cholesterol: 51 mg/dL      Total Cholesterol: 152 mg/dL            Reviewed and updated as needed this visit by Provider                    Past Medical History:   Diagnosis Date    Arthritis     Back pain     Chronic pain     Depressive disorder      Eczema     Fibromyalgia     Hypertension     Mild asthma     Nerve pain     Osteoporosis     Thyroid disease 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 Walt    RELEASE TRIGGER FINGER      SHOULDER SURGERY      SINUS SURGERY      TONSILLECTOMY       OB History   No obstetric history on file.     Lab work is in process  Labs reviewed in EPIC  BP Readings from Last 3 Encounters:   05/15/25 136/68   12/24/24 138/68   10/21/24 (!) 187/81    Wt Readings from Last 3 Encounters:   05/15/25 65.8 kg (145 lb 1.6 oz)   12/24/24 65.3 kg (144 lb)   10/21/24 66.7 kg (147 lb)                  Patient Active Problem List   Diagnosis    Hypertension    Lower Back Pain    Esophageal Reflux    Renal Cyst    Fibromyalgia    Taking Female Hormones For Postmenopausal HRT    Allergic Rhinitis    Lumbar radiculopathy    Intrinsic eczema    Serum Enzyme Levels - Lipase Elevated    Osteoporosis Senile    Herniated Disc (L4 - L5) Right    Mixed hyperlipidemia    Selective deficiency of IgG (H)    Arthralgias In Multiple Sites    Temperature Intolerance To Cold   (Consistent)    Chronic Pain    Guerrero's neuroma of left foot    Anxiety    Primary insomnia    Recurrent cold sores    Stage 3a chronic kidney disease (H)    Mild persistent asthma without complication    Polyp of duodenum    History of colonic polyps    Bilateral plantar fasciitis    Benign neoplasm of transverse colon    Mixed hearing loss, bilateral     Past Surgical History:   Procedure Laterality Date    BACK SURGERY      x3    BUNIONECTOMY      OTHER SURGICAL HISTORY N/A 08/16/2016    parathyroidectomyDr. Walt    RELEASE TRIGGER FINGER      SHOULDER SURGERY      SINUS SURGERY      TONSILLECTOMY         Social History     Tobacco Use    Smoking status: Never    Smokeless tobacco: Never   Substance Use Topics    Alcohol use:  Yes     Alcohol/week: 6.0 standard drinks of alcohol     Comment: Alcoholic Drinks/day: hard cider     Family History   Problem Relation Age of Onset    Osteoporosis Mother     Heart Disease Father     Hyperlipidemia Father     Anxiety Disorder Father     Substance Abuse Father     Sleep Apnea Sister     Snoring Sister     Heart Disease Sister     No Known Problems Daughter     No Known Problems Maternal Grandmother     No Known Problems Maternal Grandfather     No Known Problems Paternal Grandmother     No Known Problems Paternal Grandfather     No Known Problems Maternal Aunt     No Known Problems Paternal Aunt     Hypertension Brother     Depression Sister     Mental Illness Brother     Hereditary Breast and Ovarian Cancer Syndrome No family hx of     Breast Cancer No family hx of     Cancer No family hx of     Colon Cancer No family hx of     Endometrial Cancer No family hx of     Ovarian Cancer No family hx of          Current Outpatient Medications   Medication Sig Dispense Refill    acyclovir (ZOVIRAX) 5 % external ointment Apply topically 5 times daily. 30 g 3    albuterol (PROAIR HFA/PROVENTIL HFA/VENTOLIN HFA) 108 (90 Base) MCG/ACT inhaler Inhale 2 puffs into the lungs every 6 hours as needed for shortness of breath, wheezing or cough. 18 g 3    amitriptyline (ELAVIL) 25 MG tablet Take 2 tablets (50 mg) by mouth at bedtime. 180 tablet 4    amLODIPine (NORVASC) 5 MG tablet Take 1 tablet (5 mg) by mouth daily 90 tablet 3    atorvastatin (LIPITOR) 20 MG tablet Take 1 tablet (20 mg) by mouth daily. 90 tablet 3    Biotin 5000 MCG CAPS Take 1 tablet by mouth daily      cholecalciferol, vitamin D3, 1,000 unit tablet [CHOLECALCIFEROL, VITAMIN D3, 1,000 UNIT TABLET] Take 2,000 Units by mouth daily.       estradiol-norethindrone (ACTIVELLA) 1-0.5 MG tablet Take 1 tablet by mouth daily. 84 tablet 4    fluticasone (ARNUITY ELLIPTA) 200 MCG/ACT inhaler Inhale 1 puff into the lungs daily. 11 each 1    loratadine  (CLARITIN) 10 MG tablet       metoprolol succinate ER (TOPROL XL) 25 MG 24 hr tablet Take 2 tablets (50 mg) by mouth daily. 180 tablet 4    pantoprazole (PROTONIX) 20 MG EC tablet Take 20 mg by mouth daily       Allergies   Allergen Reactions    Sulfamethoxazole-Trimethoprim Rash    Adhesive [Cyanoacrylate] Unknown     Contact allergy    Aspirin (Tartrazine Only) [Tartrazine] Unknown     Abdominal pain      Bee Venom Unknown    Cefuroxime Axetil [Cefuroxime] Unknown     Rash    Cephalosporins Unknown     Rash--ceftin    Chloral Hydrate Analogues [Chloral Hydrate] Unknown     Contact allergy    Chlorhexidine Gluconate [Chlorhexidine] Unknown     Contact allergy    Clindamycin Unknown     C diff    Doxepin Unknown     hyper    Ergot Alkaloids [Ergoloid Mesylates] Unknown    Erythromycin Base [Erythromycin] Unknown     Stomach pain    Formaldehyde Hives    Hydroxyzine Unknown    Latex Unknown    Morphine Unknown     Not effective    Nickel Unknown    Nortriptyline Unknown     hyper    Pregabalin Unknown    Pseudoephedrine Unknown    Sulfamethoxazole     Terconazole Unknown    Tramadol Unknown    Trazodone Nausea and Vomiting    Trimethoprim     Vancomycin Diarrhea    Venlafaxine Unknown    Vistaril [Hydroxyzine] Unknown    Amoxil [Amoxicillin] Rash    Menthyl Valerate Rash    Metronidazole Rash     Rash and neuropathy    Quaternium-15 Rash     Recent Labs   Lab Test 05/08/24  0924 07/21/23  1030 06/19/23  1013 09/28/22  0954 08/17/21  1814 05/10/21  1201 10/08/20  1212   LDL 89 115*  --  132*   < > 136*  --    HDL 51 57  --  71   < > 60  --    TRIG 58 55  --  78   < > 60  --    ALT 18 16  --  28   < >  --   --    CR 1.10* 0.96*   < > 1.04*   < > 0.94 1.11*   GFRESTIMATED 51* 61   < > 55*   < > 58* 48*   GFRESTBLACK  --   --   --   --   --  >60 58*   POTASSIUM 4.4 4.3  --  4.4   < > 4.2 4.6   TSH  --  1.57  --   --   --   --  2.55    < > = values in this interval not displayed.      Current providers sharing in care for  "this patient include:  Patient Care Team:  Shreya Diaz MD as PCP - General (Family Medicine)  Дмитрий Henriquez MD as MD (Orthopedics)  Shreya Diaz MD as Assigned PCP  Eleanor Coyle PA-C as Physician Assistant (Physician Assistant - Medical)  Sonal Sam RPH as Pharmacist (Pharmacist)  Rich Barros RPH as Assigned MTM Pharmacist  Eleanor Coyle PA-C as Assigned Heart and Vascular Provider    The following health maintenance items are reviewed in Epic and correct as of today:  Health Maintenance   Topic Date Due    ASTHMA ACTION PLAN  01/25/2025    BMP  05/08/2025    LIPID  05/08/2025    MEDICARE ANNUAL WELLNESS VISIT  05/08/2025    HEMOGLOBIN  05/08/2025    COVID-19 Vaccine (9 - Pfizer risk 2024-25 season) 05/18/2025    MAMMO SCREENING  10/03/2025    ASTHMA CONTROL TEST  11/15/2025    MICROALBUMIN  12/24/2025    ANNUAL REVIEW OF HM ORDERS  12/24/2025    FALL RISK ASSESSMENT  05/15/2026    DEXA  07/10/2026    DIABETES SCREENING  05/08/2027    DTAP/TDAP/TD IMMUNIZATION (5 - Td or Tdap) 04/11/2028    ADVANCE CARE PLANNING  05/08/2029    HEPATITIS C SCREENING  Completed    PHQ-2 (once per calendar year)  Completed    INFLUENZA VACCINE  Completed    Pneumococcal Vaccine: 50+ Years  Completed    URINALYSIS  Completed    ZOSTER IMMUNIZATION  Completed    RSV VACCINE  Completed    HPV IMMUNIZATION  Aged Out    MENINGITIS IMMUNIZATION  Aged Out    URINE DRUG SCREEN  Discontinued    COLORECTAL CANCER SCREENING  Discontinued         Review of Systems  Constitutional, neuro, ENT, endocrine, pulmonary, cardiac, gastrointestinal, genitourinary, musculoskeletal, integument and psychiatric systems are negative, except as otherwise noted.     Objective    Exam  /68   Pulse 76   Temp 98.1  F (36.7  C) (Oral)   Resp 16   Ht 1.556 m (5' 1.25\")   Wt 65.8 kg (145 lb 1.6 oz)   LMP  (LMP Unknown)   SpO2 98%   BMI 27.19 kg/m     Estimated body mass index is 27.19 kg/m  as calculated from the " "following:    Height as of this encounter: 1.556 m (5' 1.25\").    Weight as of this encounter: 65.8 kg (145 lb 1.6 oz).    Physical Exam  GENERAL: alert and no distress  EYES: Eyes grossly normal to inspection, PERRL and conjunctivae and sclerae normal  HENT: Hearing aids bilaterally, nose and mouth without ulcers or lesions  NECK: no adenopathy, no asymmetry, masses, or scars  RESP: lungs clear to auscultation - no rales, rhonchi or wheezes  CV: regular rate and rhythm, normal S1 S2, no S3 or S4, no murmur, click or rub, no peripheral edema  ABDOMEN: soft, nontender, no hepatosplenomegaly, no masses and bowel sounds normal  MS: no gross musculoskeletal defects noted, no edema  SKIN: no suspicious lesions or rashes  NEURO: Normal strength and tone, mentation intact and speech normal  PSYCH: mentation appears normal, affect normal/bright        5/15/2025   Mini Cog   Clock Draw Score 2 Normal   3 Item Recall 3 objects recalled   Mini Cog Total Score 5              Signed Electronically by: Shreya Diaz MD    "

## 2025-05-15 NOTE — PATIENT INSTRUCTIONS
Patient Education   Preventive Care Advice   This is general advice given by our system to help you stay healthy. However, your care team may have specific advice just for you. Please talk to your care team about your preventive care needs.  Nutrition  Eat 5 or more servings of fruits and vegetables each day.  Try wheat bread, brown rice and whole grain pasta (instead of white bread, rice, and pasta).  Get enough calcium and vitamin D. Check the label on foods and aim for 100% of the RDA (recommended daily allowance).  Lifestyle  Exercise at least 150 minutes each week  (30 minutes a day, 5 days a week).  Do muscle strengthening activities 2 days a week. These help control your weight and prevent disease.  No smoking.  Wear sunscreen to prevent skin cancer.  Have a dental exam and cleaning every 6 months.  Yearly exams  See your health care team every year to talk about:  Any changes in your health.  Any medicines your care team has prescribed.  Preventive care, family planning, and ways to prevent chronic diseases.  Shots (vaccines)   HPV shots (up to age 26), if you've never had them before.  Hepatitis B shots (up to age 59), if you've never had them before.  COVID-19 shot: Get this shot when it's due.  Flu shot: Get a flu shot every year.  Tetanus shot: Get a tetanus shot every 10 years.  Pneumococcal, hepatitis A, and RSV shots: Ask your care team if you need these based on your risk.  Shingles shot (for age 50 and up)  General health tests  Diabetes screening:  Starting at age 35, Get screened for diabetes at least every 3 years.  If you are younger than age 35, ask your care team if you should be screened for diabetes.  Cholesterol test: At age 39, start having a cholesterol test every 5 years, or more often if advised.  Bone density scan (DEXA): At age 50, ask your care team if you should have this scan for osteoporosis (brittle bones).  Hepatitis C: Get tested at least once in your life.  STIs (sexually  transmitted infections)  Before age 24: Ask your care team if you should be screened for STIs.  After age 24: Get screened for STIs if you're at risk. You are at risk for STIs (including HIV) if:  You are sexually active with more than one person.  You don't use condoms every time.  You or a partner was diagnosed with a sexually transmitted infection.  If you are at risk for HIV, ask about PrEP medicine to prevent HIV.  Get tested for HIV at least once in your life, whether you are at risk for HIV or not.  Cancer screening tests  Cervical cancer screening: If you have a cervix, begin getting regular cervical cancer screening tests starting at age 21.  Breast cancer scan (mammogram): If you've ever had breasts, begin having regular mammograms starting at age 40. This is a scan to check for breast cancer.  Colon cancer screening: It is important to start screening for colon cancer at age 45.  Have a colonoscopy test every 10 years (or more often if you're at risk) Or, ask your provider about stool tests like a FIT test every year or Cologuard test every 3 years.  To learn more about your testing options, visit:   .  For help making a decision, visit:   https://bit.ly/qh33568.  Prostate cancer screening test: If you have a prostate, ask your care team if a prostate cancer screening test (PSA) at age 55 is right for you.  Lung cancer screening: If you are a current or former smoker ages 50 to 80, ask your care team if ongoing lung cancer screenings are right for you.  For informational purposes only. Not to replace the advice of your health care provider. Copyright   2023 [facility] [employer]. All rights reserved. Clinically reviewed by the [facility]. [employer] [id number] - REV 01/24.  Hearing Loss: Care Instructions  Overview     Hearing loss is a sudden or slow decrease in how well you hear. It can range from slight to profound. Permanent hearing loss can occur with aging. It also can  happen when you are exposed long-term to loud noise. Examples include listening to loud music, riding motorcycles, or being around other loud machines.  Hearing loss can affect your work and home life. It can make you feel lonely or depressed. You may feel that you have lost your independence. But hearing aids and other devices can help you hear better and feel connected to others.  Follow-up care is a key part of your treatment and safety. Be sure to make and go to all appointments, and call your doctor if you are having problems. It's also a good idea to know your test results and keep a list of the medicines you take.  How can you care for yourself at home?  Avoid loud noises whenever possible. This helps keep your hearing from getting worse.  Always wear hearing protection around loud noises.  Wear a hearing aid as directed.  A professional can help you pick a hearing aid that will work best for you.  You can also get hearing aids over the counter for mild to moderate hearing loss.  Have hearing tests as your doctor suggests. They can show whether your hearing has changed. Your hearing aid may need to be adjusted.  Use other devices as needed. These may include:  Telephone amplifiers and hearing aids that can connect to a television, stereo, radio, or microphone.  Devices that use lights or vibrations. These alert you to the doorbell, a ringing telephone, or a baby monitor.  Television closed-captioning. This shows the words at the bottom of the screen. Most new TVs can do this.  TTY (text telephone). This lets you type messages back and forth on the telephone instead of talking or listening. These devices are also called TDD. When messages are typed on the keyboard, they are sent over the phone line to a receiving TTY. The message is shown on a monitor.  Use text messaging, social media, and email if it is hard for you to communicate by telephone.  Try to learn a listening technique called speechreading. It is  "not lipreading. You pay attention to people's gestures, expressions, posture, and tone of voice. These clues can help you understand what a person is saying. Face the person you are talking to, and have them face you. Make sure the lighting is good. You need to see the other person's face clearly.  Think about counseling if you need help to adjust to your hearing loss.  When should you call for help?  Watch closely for changes in your health, and be sure to contact your doctor if:    You think your hearing is getting worse.     You have new symptoms, such as dizziness or nausea.   Where can you learn more?  Go to https://www.[employer]/patiented  Enter R798 in the search box to learn more about \"Hearing Loss: Care Instructions.\"  Current as of: October 27, 2024  Content Version: 14.4    3697-5208 [employer].   Care instructions adapted under license by your healthcare professional. If you have questions about a medical condition or this instruction, always ask your healthcare professional. [employer] disclaims any warranty or liability for your use of this information.    Learning About Stress  What is stress?     Stress is your body's response to a hard situation. Your body can have a physical, emotional, or mental response. Stress is a fact of life for most people, and it affects everyone differently. What causes stress for you may not be stressful for someone else.  A lot of things can cause stress. You may feel stress when you go on a job interview, take a test, or run a race. This kind of short-term stress is normal and even useful. It can help you if you need to work hard or react quickly. For example, stress can help you finish an important job on time.  Long-term stress is caused by ongoing stressful situations or events. Examples of long-term stress include long-term health problems, ongoing problems at work, or conflicts in your family. Long-term stress can harm your " health.  How does stress affect your health?  When you are stressed, your body responds as though you are in danger. It makes hormones that speed up your heart, make you breathe faster, and give you a burst of energy. This is called the fight-or-flight stress response. If the stress is over quickly, your body goes back to normal and no harm is done.  But if stress happens too often or lasts too long, it can have bad effects. Long-term stress can make you more likely to get sick, and it can make symptoms of some diseases worse. If you tense up when you are stressed, you may develop neck, shoulder, or low back pain. Stress is linked to high blood pressure and heart disease.  Stress also harms your emotional health. It can make you dougherty, tense, or depressed. Your relationships may suffer, and you may not do well at work or school.  What can you do to manage stress?  You can try these things to help manage stress:   Do something active. Exercise or activity can help reduce stress. Walking is a great way to get started. Even everyday activities such as housecleaning or yard work can help.  Try yoga or lizett chi. These techniques combine exercise and meditation. You may need some training at first to learn them.  Do something you enjoy. For example, listen to music or go to a movie. Practice your hobby or do volunteer work.  Meditate. This can help you relax, because you are not worrying about what happened before or what may happen in the future.  Do guided imagery. Imagine yourself in any setting that helps you feel calm. You can use online videos, books, or a teacher to guide you.  Do breathing exercises. For example:  From a standing position, bend forward from the waist with your knees slightly bent. Let your arms dangle close to the floor.  Breathe in slowly and deeply as you return to a standing position. Roll up slowly and lift your head last.  Hold your breath for just a few seconds in the standing  "position.  Breathe out slowly and bend forward from the waist.  Let your feelings out. Talk, laugh, cry, and express anger when you need to. Talking with supportive friends or family, a counselor, or a george leader about your feelings is a healthy way to relieve stress. Avoid discussing your feelings with people who make you feel worse.  Write. It may help to write about things that are bothering you. This helps you find out how much stress you feel and what is causing it. When you know this, you can find better ways to cope.  What can you do to prevent stress?  You might try some of these things to help prevent stress:  Manage your time. This helps you find time to do the things you want and need to do.  Get enough sleep. Your body recovers from the stresses of the day while you are sleeping.  Get support. Your family, friends, and community can make a difference in how you experience stress.  Limit your news feed. Avoid or limit time on social media or news that may make you feel stressed.  Do something active. Exercise or activity can help reduce stress. Walking is a great way to get started.  Where can you learn more?  Go to https://www.[employer].net/patiented  Enter N032 in the search box to learn more about \"Learning About Stress.\"  Current as of: October 24, 2024  Content Version: 14.4 2024-2025 [employer].   Care instructions adapted under license by your healthcare professional. If you have questions about a medical condition or this instruction, always ask your healthcare professional. [employer] disclaims any warranty or liability for your use of this information.    Learning About Sleeping Well  What does sleeping well mean?     Sleeping well means getting enough sleep to feel good and stay healthy. How much sleep is enough varies among people.  The number of hours you sleep and how you feel when you wake up are both important. If you do not feel refreshed, you probably need " "more sleep. Another sign of not getting enough sleep is feeling tired during the day.  Experts recommend that adults get at least 7 or more hours of sleep per day. Children and older adults need more sleep.  Why is getting enough sleep important?  Getting enough quality sleep is a basic part of good health. When your sleep suffers, your physical health, mood, and your thoughts can suffer too. You may find yourself feeling more grumpy or stressed. Not getting enough sleep also can lead to serious problems, including injury, accidents, anxiety, and depression.  What might cause poor sleeping?  Many things can cause sleep problems, including:  Changes to your sleep schedule.  Stress. Stress can be caused by fear about a single event, such as giving a speech. Or you may have ongoing stress, such as worry about work or school.  Depression, anxiety, and other mental or emotional conditions.  Changes in your sleep habits or surroundings. This includes changes that happen where you sleep, such as noise, light, or sleeping in a different bed. It also includes changes in your sleep pattern, such as having jet lag or working a late shift.  Health problems, such as pain, breathing problems, and restless legs syndrome.  Lack of regular exercise.  Using alcohol, nicotine, or caffeine before bed.  How can you help yourself?  Here are some tips that may help you sleep more soundly and wake up feeling more refreshed.  Your sleeping area   Use your bedroom only for sleeping and sex. A bit of light reading may help you fall asleep. But if it doesn't, do your reading elsewhere in the house. Try not to use your TV, computer, smartphone, or tablet while you are in bed.  Be sure your bed is big enough to stretch out comfortably, especially if you have a sleep partner.  Keep your bedroom quiet, dark, and cool. Use curtains, blinds, or a sleep mask to block out light. To block out noise, use earplugs, soothing music, or a \"white noise\" " "machine.  Your evening and bedtime routine   Create a relaxing bedtime routine. You might want to take a warm shower or bath, or listen to soothing music.  Go to bed at the same time every night. And get up at the same time every morning, even if you feel tired.  What to avoid   Limit caffeine (coffee, tea, caffeinated sodas) during the day, and don't have any for at least 6 hours before bedtime.  Avoid drinking alcohol before bedtime. Alcohol can cause you to wake up more often during the night.  Try not to smoke or use tobacco, especially in the evening. Nicotine can keep you awake.  Limit naps during the day, especially close to bedtime.  Avoid lying in bed awake for too long. If you can't fall asleep or if you wake up in the middle of the night and can't get back to sleep within about 20 minutes, get out of bed and go to another room until you feel sleepy.  Avoid taking medicine right before bed that may keep you awake or make you feel hyper or energized. Your doctor can tell you if your medicine may do this and if you can take it earlier in the day.  If you can't sleep   Imagine yourself in a peaceful, pleasant scene. Focus on the details and feelings of being in a place that is relaxing.  Get up and do a quiet or boring activity until you feel sleepy.  Avoid drinking any liquids before going to bed to help prevent waking up often to use the bathroom.  Where can you learn more?  Go to https://www.[employer].net/patiented  Enter J942 in the search box to learn more about \"Learning About Sleeping Well.\"  Current as of: July 31, 2024  Content Version: 14.4    6930-6161 [employer].   Care instructions adapted under license by your healthcare professional. If you have questions about a medical condition or this instruction, always ask your healthcare professional. [employer] disclaims any warranty or liability for your use of this information.       "

## 2025-05-17 ENCOUNTER — HEALTH MAINTENANCE LETTER (OUTPATIENT)
Age: 80
End: 2025-05-17

## 2025-05-19 ENCOUNTER — MYC MEDICAL ADVICE (OUTPATIENT)
Dept: FAMILY MEDICINE | Facility: CLINIC | Age: 80
End: 2025-05-19
Payer: COMMERCIAL

## 2025-06-12 ENCOUNTER — TRANSFERRED RECORDS (OUTPATIENT)
Dept: HEALTH INFORMATION MANAGEMENT | Facility: CLINIC | Age: 80
End: 2025-06-12
Payer: COMMERCIAL

## 2025-06-12 ENCOUNTER — MYC MEDICAL ADVICE (OUTPATIENT)
Dept: FAMILY MEDICINE | Facility: CLINIC | Age: 80
End: 2025-06-12
Payer: COMMERCIAL

## 2025-06-12 DIAGNOSIS — I10 ESSENTIAL HYPERTENSION: ICD-10-CM

## 2025-06-12 DIAGNOSIS — K21.9 GASTROESOPHAGEAL REFLUX DISEASE WITHOUT ESOPHAGITIS: Primary | ICD-10-CM

## 2025-06-12 RX ORDER — AMLODIPINE BESYLATE 5 MG/1
5 TABLET ORAL DAILY
Qty: 90 TABLET | Refills: 3 | Status: SHIPPED | OUTPATIENT
Start: 2025-06-12

## 2025-06-12 RX ORDER — PANTOPRAZOLE SODIUM 20 MG/1
20 TABLET, DELAYED RELEASE ORAL DAILY
Qty: 90 TABLET | Refills: 3 | Status: SHIPPED | OUTPATIENT
Start: 2025-06-12

## 2025-06-29 DIAGNOSIS — Z79.890 CURRENT LONG-TERM USE OF POSTMENOPAUSAL HORMONE REPLACEMENT THERAPY: ICD-10-CM

## 2025-06-29 RX ORDER — ESTRADIOL AND NORETHINDRONE ACETATE 1; .5 MG/1; MG/1
1 TABLET ORAL DAILY
Qty: 84 TABLET | Refills: 3 | Status: SHIPPED | OUTPATIENT
Start: 2025-06-29

## 2025-07-04 DIAGNOSIS — I10 ESSENTIAL HYPERTENSION: ICD-10-CM

## 2025-07-04 DIAGNOSIS — I10 ESSENTIAL HYPERTENSION: Primary | ICD-10-CM

## 2025-07-07 RX ORDER — AMLODIPINE BESYLATE 5 MG/1
5 TABLET ORAL DAILY
Qty: 90 TABLET | Refills: 0 | OUTPATIENT
Start: 2025-07-07

## 2025-07-07 RX ORDER — METOPROLOL SUCCINATE 25 MG/1
50 TABLET, EXTENDED RELEASE ORAL DAILY
Qty: 180 TABLET | Refills: 3 | Status: SHIPPED | OUTPATIENT
Start: 2025-07-07

## 2025-07-07 RX ORDER — AMLODIPINE BESYLATE 5 MG/1
5 TABLET ORAL DAILY
Qty: 90 TABLET | Refills: 3 | Status: SHIPPED | OUTPATIENT
Start: 2025-07-07

## 2025-07-14 NOTE — PROGRESS NOTES
HEART CARE ENCOUNTER NOTE      [facility]  [phone]      Assessment/Recommendations   Assessment:   Coronary artery disease: Minimal nonobstructive as seen on CT coronary angiogram 6/19/2023.  Patient denies any anginal symptoms.  Essential hypertension: Controlled on metoprolol succinate 25 mg twice daily, amlodipine 5 mg daily.    Hyperlipidemia: Controlled on atorvastatin 20 mg daily.  Last lipids 5/15/2025 showed LDL of 85  Inappropriate sinus tachycardia: On metoprolol 25 mg twice daily.  Fairly well controlled.  Patient notes a few seconds of palpitations maybe once a week    Plan:   Continue current medications  Patient will continue monitoring her asthma symptoms and if worsening may consider decreasing metoprolol to see if there is an improvement.  Could trial diltiazem however would likely stop amlodipine and trial a different antihypertensive given both her calcium channel blockers  Continue trying to stay active      Follow up in 1 year with me or Dr. Espinoza.       History of Present Illness/Subjective    HPI: Nicole Marcial is a 79 year old female with PMHx of hypertension, nonobstructive CAD, hyperlipidemia, inappropriate sinus tachycardia presents for follow-up.  Patient last saw Dr. Espinoza 10/9/2023 for preop evaluation and denied any recurrence of the chest pain she had prior to the CT coronary angiogram.  Patient had been tolerating metoprolol for her sinus tachycardia with heart rates ranging 70 to 100 bpm.  I last saw patient 5/7/2024 where she had been feeling good though noted an increase in fluttering occurring at rest a few times a week but less than a minute.  Was back at the gym and riding her bike denying exertional angina or dyspnea.  We increased metoprolol to 50 mg daily to try and improve palpitations.    Patient notes from a cardiac standpoint she has been doing well for the past year.  Notes her fluttering is fairly well-controlled only notes of for a few  seconds maybe once a week.  Patient tries to go to the [facility] 2-3 times a week and recently got out her electric bike.  Takes half a mile walk every day denying any exertional angina or dyspnea.  Patient notes she has been starting to read out lab to improve her hearing and noticed her asthma seems to be worse this summer.  Uses inhalers occasionally but not daily.  Patient notes occasional swelling in her ankles and feet with long drives and warmer weather.  Wears compression stockings occasionally.  Patient notes her  has stomach cancer and she has been going through a bit of a depression.  Is able to talk with friends and feels she is okay right now.        Sleep study 6/8/2016    Sleep apnea was not seen in this study.   The severity of sleep-disordered breathing can be underestimated during portable test because the apnea/hypopnea index is calculated using total recording time rather than total sleep time. A full night in-lab polysomnography can be considered.   Nasal CPAP is not indicated.   Suggest optimizing sleep hygiene and avoiding any further sleep deprivation.   Recommend evaluation due to periodic leg movements.   Measures aimed at increasing sleep consolidation can be beneficial.     Holter monitor 7/21/2022  1. Normal Holter monitor recording. Patient demonstrates a tendency towards sinus tachycardia during the waking hours. Average heart rate 107 bpm. Appropriate nocturnal heart rate slowing is observed. Sources for sinus tachycardia including dehydration, anemia, hypoxia, endocrine abnormality etc. should be considered. Patient may have postural tachycardia syndrome.   2. Patient had multiple symptomatic recordings all of which corresponded to sinus tachycardia.   3. Rare atrial ectopy and no sustained atrial tachyarrhythmia.   4. No ventricular ectopy   5. No profound bradycardia or pauses.     ECHO 7/21/2022  There is borderline concentric left ventricular hypertrophy.  The visual ejection  fraction is 55-60%.  No regional wall motion abnormalities noted.  No significant valve disease.     Stress test 11/2/2022   1. Objective: No electrocardiographic evidence of ischemia, but with reduced sensitivity due to not attaining target heart rate (patient achieved 71% of predicted maximal heart rate for age).  2. Subjective: Patient reported 1/10 chest discomfort with exercise.  3. Average functional capacity for age.     CT coronary angiogram 6/19/2023     Minimal nonobstructive atherosclerotic coronary artery disease.    Radiology review for incidental non cardiac findings will be under separate report by the radiologist.       Physical Examination  Review of Systems   Vitals: /60 (BP Location: Left arm, Patient Position: Sitting, Cuff Size: Adult Regular)   Pulse 72   Resp 20   Wt 67 kg (147 lb 9.6 oz)   LMP  (LMP Unknown)   SpO2 97%   BMI 27.66 kg/m    BMI= Body mass index is 27.66 kg/m .  Wt Readings from Last 3 Encounters:   07/21/25 67 kg (147 lb 9.6 oz)   05/15/25 65.8 kg (145 lb 1.6 oz)   12/24/24 65.3 kg (144 lb)           ENT/Mouth: membranes moist, no oral lesions or bleeding gums.      EYES:  no scleral icterus, normal conjunctivae       Chest/Lungs:   lungs are clear to auscultation, no rales or wheezing,  equal chest wall expansion    Cardiovascular:   Regular. Normal first and second heart sounds with no murmurs, rubs, or gallops; the radial and posterior tibial pulses are intact,  no edema bilaterally        Extremities: no cyanosis or clubbing   Skin: no xanthelasma, warm.    Neurologic: no tremors     Psychiatric: alert and oriented x3, calm        Please refer above for cardiac ROS details.        Medical History  Surgical History Family History Social History   Past Medical History:   Diagnosis Date    Arthritis     Back pain     Chronic pain     Depressive disorder     Eczema     Fibromyalgia     Hypertension     Mild asthma     Nerve pain     Osteoporosis     Thyroid disease  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 Pauma    RELEASE TRIGGER FINGER      SHOULDER SURGERY      SINUS SURGERY      TONSILLECTOMY       Family History   Problem Relation Age of Onset    Osteoporosis Mother     Heart Disease Father     Hyperlipidemia Father     Anxiety Disorder Father     Substance Abuse Father     Sleep Apnea Sister     Snoring Sister     Heart Disease Sister     No Known Problems Daughter     No Known Problems Maternal Grandmother     No Known Problems Maternal Grandfather     No Known Problems Paternal Grandmother     No Known Problems Paternal Grandfather     No Known Problems Maternal Aunt     No Known Problems Paternal Aunt     Hypertension Brother     Depression Sister     Mental Illness Brother     Hereditary Breast and Ovarian Cancer Syndrome No family hx of     Breast Cancer No family hx of     Cancer No family hx of     Colon Cancer No family hx of     Endometrial Cancer No family hx of     Ovarian Cancer No family hx of         Social History     Socioeconomic History    Marital status:      Spouse name: Not on file    Number of children: Not on file    Years of education: Not on file    Highest education level: Not on file   Occupational History    Not on file   Tobacco Use    Smoking status: Never    Smokeless tobacco: Never   Vaping Use    Vaping status: Never Used   Substance and Sexual Activity    Alcohol use: Yes     Alcohol/week: 6.0 standard drinks of alcohol     Comment: Alcoholic Drinks/day: hard cider    Drug use: Not Currently     Types: Marijuana     Comment: Drug use: very rare    Sexual activity: Not Currently   Other Topics Concern    Not on file   Social History Narrative    Not on file     Social Drivers of Health     Financial Resource Strain: Low Risk  (5/13/2025)    Financial Resource Strain     Within  the past 12 months, have you or your family members you live with been unable to get utilities (heat, electricity) when it was really needed?: No   Food Insecurity: Low Risk  (5/13/2025)    Food Insecurity     Within the past 12 months, did you worry that your food would run out before you got money to buy more?: No     Within the past 12 months, did the food you bought just not last and you didn t have money to get more?: No   Transportation Needs: Low Risk  (5/13/2025)    Transportation Needs     Within the past 12 months, has lack of transportation kept you from medical appointments, getting your medicines, non-medical meetings or appointments, work, or from getting things that you need?: No   Physical Activity: Patient Declined (5/13/2025)    Exercise Vital Sign     Days of Exercise per Week: Patient declined     Minutes of Exercise per Session: Patient declined   Stress: Stress Concern Present (5/13/2025)    [place] [employer] - Occupational Stress Questionnaire     Feeling of Stress : Rather much   Social Connections: Unknown (5/13/2025)    Social Connection and Isolation Panel [NHANES]     Frequency of Communication with Friends and Family: Not on file     Frequency of Social Gatherings with Friends and Family: Patient declined     Attends [employer] Services: Not on file     Active Member of Clubs or Organizations: Not on file     Attends Club or Organization Meetings: Not on file     Marital Status: Not on file   Interpersonal Safety: Low Risk  (5/15/2025)    Interpersonal Safety     Do you feel physically and emotionally safe where you currently live?: Yes     Within the past 12 months, have you been hit, slapped, kicked or otherwise physically hurt by someone?: No     Within the past 12 months, have you been humiliated or emotionally abused in other ways by your partner or ex-partner?: No   Housing Stability: Low Risk  (5/13/2025)    Housing Stability     Do you have housing? : Yes      Are you worried about losing your housing?: No           Medications  Allergies   Current Outpatient Medications   Medication Sig Dispense Refill    ketorolac (ACULAR) 0.5 % ophthalmic solution 1 drop 4 times daily.      acyclovir (ZOVIRAX) 5 % external ointment Apply topically 5 times daily. 30 g 3    albuterol (PROAIR HFA/PROVENTIL HFA/VENTOLIN HFA) 108 (90 Base) MCG/ACT inhaler Inhale 2 puffs into the lungs every 6 hours as needed for shortness of breath, wheezing or cough. 18 g 3    amitriptyline (ELAVIL) 25 MG tablet Take 2 tablets (50 mg) by mouth at bedtime. 180 tablet 4    amLODIPine (NORVASC) 5 MG tablet Take 1 tablet (5 mg) by mouth daily. 90 tablet 3    atorvastatin (LIPITOR) 20 MG tablet Take 1 tablet (20 mg) by mouth daily. 90 tablet 3    Biotin 5000 MCG CAPS Take 1 tablet by mouth daily      cholecalciferol, vitamin D3, 1,000 unit tablet [CHOLECALCIFEROL, VITAMIN D3, 1,000 UNIT TABLET] Take 2,000 Units by mouth daily.       estradiol-norethindrone (ACTIVELLA) 1-0.5 MG tablet Take 1 tablet by mouth once daily 84 tablet 3    fluticasone (ARNUITY ELLIPTA) 200 MCG/ACT inhaler Inhale 1 puff into the lungs daily. 11 each 1    loratadine (CLARITIN) 10 MG tablet       metoprolol succinate ER (TOPROL XL) 25 MG 24 hr tablet Take 2 tablets by mouth once daily 180 tablet 3    pantoprazole (PROTONIX) 20 MG EC tablet Take 1 tablet (20 mg) by mouth daily. 90 tablet 3       Allergies   Allergen Reactions    Sulfamethoxazole-Trimethoprim Rash    Acyclovir      Other Reaction(s): G I problems with oral acyclov    Adhesive [Cyanoacrylate] Unknown     Contact allergy    Aspirin (Tartrazine Only) [Tartrazine] Unknown     Abdominal pain      Bee Venom Unknown    Cefuroxime Axetil [Cefuroxime] Unknown     Rash    Cephalosporins Unknown     Rash--ceftin    Chloral Hydrate Analogues [Chloral Hydrate] Unknown     Contact allergy    Chlorhexidine Gluconate [Chlorhexidine] Unknown     Contact allergy    Clindamycin Unknown     C  "diff    Doxepin Unknown     hyper    Ergot Alkaloids [Ergoloid Mesylates] Unknown    Erythromycin Base [Erythromycin] Unknown     Stomach pain    Formaldehyde Hives    Hydroxyzine Unknown    Ibuprofen GI Disturbance    Latex Unknown    Morphine Unknown     Not effective    Nickel Unknown    Nortriptyline Unknown     hyper    Pregabalin Unknown    Pseudoephedrine Unknown    Ranitidine      Other Reaction(s): GI problems, diarrhea    Sulfamethoxazole     Terconazole Unknown    Tramadol Unknown    Trazodone Nausea and Vomiting    Trimethoprim     Vancomycin Diarrhea    Venlafaxine Unknown    Vistaril [Hydroxyzine] Unknown    Adhesive Tape Rash    Amoxil [Amoxicillin] Rash    Menthyl Valerate Rash    Metronidazole Rash     Rash and neuropathy    Quaternium-15 Rash          Lab Results    Chemistry/lipid CBC Cardiac Enzymes/BNP/TSH/INR   Recent Labs   Lab Test 07/21/23  1030   CHOL 183   HDL 57   *   TRIG 55     Recent Labs   Lab Test 07/21/23  1030 09/28/22  0954 05/09/22  0915   * 132* 86     Recent Labs   Lab Test 07/21/23  1030      POTASSIUM 4.3   CHLORIDE 106   CO2 22   *   BUN 11.3   CR 0.96*   GFRESTIMATED 61   DOMITILA 9.2     Recent Labs   Lab Test 07/21/23  1030 06/19/23  1013 09/28/22  0954   CR 0.96* 1.1 1.04*     No results for input(s): \"A1C\" in the last 98331 hours.       Recent Labs   Lab Test 07/21/23  1030   WBC 9.1   HGB 14.0   HCT 41.8   MCV 97        Recent Labs   Lab Test 07/21/23  1030 09/28/22  0954 05/09/22  0915   HGB 14.0 14.9 13.5    No results for input(s): \"TROPONINI\" in the last 92203 hours.  No results for input(s): \"BNP\", \"NTBNPI\", \"NTBNP\" in the last 42587 hours.  Recent Labs   Lab Test 07/21/23  1030   TSH 1.57     No results for input(s): \"INR\" in the last 05503 hours.       Eleanor Coyle PA-C                                       "

## 2025-07-21 ENCOUNTER — OFFICE VISIT (OUTPATIENT)
Dept: CARDIOLOGY | Facility: CLINIC | Age: 80
End: 2025-07-21
Payer: COMMERCIAL

## 2025-07-21 VITALS
WEIGHT: 147.6 LBS | BODY MASS INDEX: 27.66 KG/M2 | OXYGEN SATURATION: 97 % | SYSTOLIC BLOOD PRESSURE: 138 MMHG | RESPIRATION RATE: 20 BRPM | DIASTOLIC BLOOD PRESSURE: 60 MMHG | HEART RATE: 72 BPM

## 2025-07-21 DIAGNOSIS — I10 ESSENTIAL HYPERTENSION: Primary | ICD-10-CM

## 2025-07-21 DIAGNOSIS — I47.11 INAPPROPRIATE SINUS TACHYCARDIA: ICD-10-CM

## 2025-07-21 DIAGNOSIS — E78.2 MIXED HYPERLIPIDEMIA: ICD-10-CM

## 2025-07-21 PROCEDURE — 99214 OFFICE O/P EST MOD 30 MIN: CPT | Performed by: STUDENT IN AN ORGANIZED HEALTH CARE EDUCATION/TRAINING PROGRAM

## 2025-07-21 PROCEDURE — 3078F DIAST BP <80 MM HG: CPT | Performed by: STUDENT IN AN ORGANIZED HEALTH CARE EDUCATION/TRAINING PROGRAM

## 2025-07-21 PROCEDURE — 3075F SYST BP GE 130 - 139MM HG: CPT | Performed by: STUDENT IN AN ORGANIZED HEALTH CARE EDUCATION/TRAINING PROGRAM

## 2025-07-21 RX ORDER — KETOROLAC TROMETHAMINE 5 MG/ML
1 SOLUTION OPHTHALMIC 4 TIMES DAILY
COMMUNITY
Start: 2025-07-18

## 2025-07-21 NOTE — PATIENT INSTRUCTIONS
Nicole Marcial,    It was a pleasure to see you today at the [facility].     My recommendations after this visit include:    - Continue current medications.   - Monitor swelling- if it is getting worse or not improving in the winter let me know  - If asthma isn't better with weather changes let me know and can consider decreasing metoprolol to see if that helps  - Follow up with Dr. Espinoza in 1 year, sooner if needed    - Please call [phone], if you have any questions or concerns      Eleanor Coyle PA-C  \

## 2025-07-21 NOTE — LETTER
7/21/2025    Shreya Diaz MD  [address]  [facility] [address]    RE: Nicole Marcial       Dear Colleague,     I had the pleasure of seeing Nicole Mracial in the [facility].    HEART CARE ENCOUNTER NOTE      [facility]  [phone]      Assessment/Recommendations   Assessment:   Coronary artery disease: Minimal nonobstructive as seen on CT coronary angiogram 6/19/2023.  Patient denies any anginal symptoms.  Essential hypertension: Controlled on metoprolol succinate 25 mg twice daily, amlodipine 5 mg daily.    Hyperlipidemia: Controlled on atorvastatin 20 mg daily.  Last lipids 5/15/2025 showed LDL of 85  Inappropriate sinus tachycardia: On metoprolol 25 mg twice daily.  Fairly well controlled.  Patient notes a few seconds of palpitations maybe once a week    Plan:   Continue current medications  Patient will continue monitoring her asthma symptoms and if worsening may consider decreasing metoprolol to see if there is an improvement.  Could trial diltiazem however would likely stop amlodipine and trial a different antihypertensive given both her calcium channel blockers  Continue trying to stay active      Follow up in 1 year with me or Dr. Espinoza.       History of Present Illness/Subjective    HPI: Nicole Marcial is a 79 year old female with PMHx of hypertension, nonobstructive CAD, hyperlipidemia, inappropriate sinus tachycardia presents for follow-up.  Patient last saw Dr. Espinoza 10/9/2023 for preop evaluation and denied any recurrence of the chest pain she had prior to the CT coronary angiogram.  Patient had been tolerating metoprolol for her sinus tachycardia with heart rates ranging 70 to 100 bpm.  I last saw patient 5/7/2024 where she had been feeling good though noted an increase in fluttering occurring at rest a few times a week but less than a minute.  Was back at the gym and riding her bike denying exertional angina or dyspnea.  We increased  metoprolol to 50 mg daily to try and improve palpitations.    Patient notes from a cardiac standpoint she has been doing well for the past year.  Notes her fluttering is fairly well-controlled only notes of for a few seconds maybe once a week.  Patient tries to go to the [facility] 2-3 times a week and recently got out her electric bike.  Takes half a mile walk every day denying any exertional angina or dyspnea.  Patient notes she has been starting to read out lab to improve her hearing and noticed her asthma seems to be worse this summer.  Uses inhalers occasionally but not daily.  Patient notes occasional swelling in her ankles and feet with long drives and warmer weather.  Wears compression stockings occasionally.  Patient notes her  has stomach cancer and she has been going through a bit of a depression.  Is able to talk with friends and feels she is okay right now.        Sleep study 6/8/2016     Sleep apnea was not seen in this study.    The severity of sleep-disordered breathing can be underestimated during portable test because the apnea/hypopnea index is calculated using total recording time rather than total sleep time. A full night in-lab polysomnography can be considered.    Nasal CPAP is not indicated.    Suggest optimizing sleep hygiene and avoiding any further sleep deprivation.    Recommend evaluation due to periodic leg movements.    Measures aimed at increasing sleep consolidation can be beneficial.     Holter monitor 7/21/2022  1. Normal Holter monitor recording. Patient demonstrates a tendency towards sinus tachycardia during the waking hours. Average heart rate 107 bpm. Appropriate nocturnal heart rate slowing is observed. Sources for sinus tachycardia including dehydration, anemia, hypoxia, endocrine abnormality etc. should be considered. Patient may have postural tachycardia syndrome.   2. Patient had multiple symptomatic recordings all of which corresponded to sinus tachycardia.   3. Rare  atrial ectopy and no sustained atrial tachyarrhythmia.   4. No ventricular ectopy   5. No profound bradycardia or pauses.     ECHO 7/21/2022  There is borderline concentric left ventricular hypertrophy.  The visual ejection fraction is 55-60%.  No regional wall motion abnormalities noted.  No significant valve disease.     Stress test 11/2/2022   1. Objective: No electrocardiographic evidence of ischemia, but with reduced sensitivity due to not attaining target heart rate (patient achieved 71% of predicted maximal heart rate for age).  2. Subjective: Patient reported 1/10 chest discomfort with exercise.  3. Average functional capacity for age.     CT coronary angiogram 6/19/2023      Minimal nonobstructive atherosclerotic coronary artery disease.     Radiology review for incidental non cardiac findings will be under separate report by the radiologist.       Physical Examination  Review of Systems   Vitals: /60 (BP Location: Left arm, Patient Position: Sitting, Cuff Size: Adult Regular)   Pulse 72   Resp 20   Wt 67 kg (147 lb 9.6 oz)   LMP  (LMP Unknown)   SpO2 97%   BMI 27.66 kg/m    BMI= Body mass index is 27.66 kg/m .  Wt Readings from Last 3 Encounters:   07/21/25 67 kg (147 lb 9.6 oz)   05/15/25 65.8 kg (145 lb 1.6 oz)   12/24/24 65.3 kg (144 lb)           ENT/Mouth: membranes moist, no oral lesions or bleeding gums.      EYES:  no scleral icterus, normal conjunctivae       Chest/Lungs:   lungs are clear to auscultation, no rales or wheezing,  equal chest wall expansion    Cardiovascular:   Regular. Normal first and second heart sounds with no murmurs, rubs, or gallops; the radial and posterior tibial pulses are intact,  no edema bilaterally        Extremities: no cyanosis or clubbing   Skin: no xanthelasma, warm.    Neurologic: no tremors     Psychiatric: alert and oriented x3, calm        Please refer above for cardiac ROS details.        Medical History  Surgical History Family History Social  History   Past Medical History:   Diagnosis Date     Arthritis      Back pain      Chronic pain      Depressive disorder      Eczema      Fibromyalgia      Hypertension      Mild asthma      Nerve pain      Osteoporosis      Thyroid disease Hyperparathyroid gone from surgery     Past Surgical History:   Procedure Laterality Date     BACK SURGERY      x3     BUNIONECTOMY       OTHER SURGICAL HISTORY N/A 08/16/2016    parathyroidectomyDr. Walt     RELEASE TRIGGER FINGER       SHOULDER SURGERY       SINUS SURGERY       TONSILLECTOMY       Family History   Problem Relation Age of Onset     Osteoporosis Mother      Heart Disease Father      Hyperlipidemia Father      Anxiety Disorder Father      Substance Abuse Father      Sleep Apnea Sister      Snoring Sister      Heart Disease Sister      No Known Problems Daughter      No Known Problems Maternal Grandmother      No Known Problems Maternal Grandfather      No Known Problems Paternal Grandmother      No Known Problems Paternal Grandfather      No Known Problems Maternal Aunt      No Known Problems Paternal Aunt      Hypertension Brother      Depression Sister      Mental Illness Brother      Hereditary Breast and Ovarian Cancer Syndrome No family hx of      Breast Cancer No family hx of      Cancer No family hx of      Colon Cancer No family hx of      Endometrial Cancer No family hx of      Ovarian Cancer No family hx of         Social History     Socioeconomic History     Marital status:      Spouse name: Not on file     Number of children: Not on file     Years of education: Not on file     Highest education level: Not on file   Occupational History     Not on file   Tobacco Use     Smoking status: Never     Smokeless tobacco: Never   Vaping Use     Vaping status: Never Used   Substance and Sexual Activity     Alcohol use: Yes     Alcohol/week: 6.0 standard drinks of alcohol     Comment: Alcoholic Drinks/day: hard cider     Drug use: Not Currently      Types: Marijuana     Comment: Drug use: very rare     Sexual activity: Not Currently   Other Topics Concern     Not on file   Social History Narrative     Not on file     Social Drivers of Health     Financial Resource Strain: Low Risk  (5/13/2025)    Financial Resource Strain      Within the past 12 months, have you or your family members you live with been unable to get utilities (heat, electricity) when it was really needed?: No   Food Insecurity: Low Risk  (5/13/2025)    Food Insecurity      Within the past 12 months, did you worry that your food would run out before you got money to buy more?: No      Within the past 12 months, did the food you bought just not last and you didn t have money to get more?: No   Transportation Needs: Low Risk  (5/13/2025)    Transportation Needs      Within the past 12 months, has lack of transportation kept you from medical appointments, getting your medicines, non-medical meetings or appointments, work, or from getting things that you need?: No   Physical Activity: Patient Declined (5/13/2025)    Exercise Vital Sign      Days of Exercise per Week: Patient declined      Minutes of Exercise per Session: Patient declined   Stress: Stress Concern Present (5/13/2025)    [employer] - Occupational Stress Questionnaire      Feeling of Stress : Rather much   Social Connections: Unknown (5/13/2025)    Social Connection and Isolation Panel [NHANES]      Frequency of Communication with Friends and Family: Not on file      Frequency of Social Gatherings with Friends and Family: Patient declined      Attends Orthodoxy Services: Not on file      Active Member of Clubs or Organizations: Not on file      Attends Club or Organization Meetings: Not on file      Marital Status: Not on file   Interpersonal Safety: Low Risk  (5/15/2025)    Interpersonal Safety      Do you feel physically and emotionally safe where you currently live?: Yes      Within the past 12 months,  have you been hit, slapped, kicked or otherwise physically hurt by someone?: No      Within the past 12 months, have you been humiliated or emotionally abused in other ways by your partner or ex-partner?: No   Housing Stability: Low Risk  (5/13/2025)    Housing Stability      Do you have housing? : Yes      Are you worried about losing your housing?: No           Medications  Allergies   Current Outpatient Medications   Medication Sig Dispense Refill     ketorolac (ACULAR) 0.5 % ophthalmic solution 1 drop 4 times daily.       acyclovir (ZOVIRAX) 5 % external ointment Apply topically 5 times daily. 30 g 3     albuterol (PROAIR HFA/PROVENTIL HFA/VENTOLIN HFA) 108 (90 Base) MCG/ACT inhaler Inhale 2 puffs into the lungs every 6 hours as needed for shortness of breath, wheezing or cough. 18 g 3     amitriptyline (ELAVIL) 25 MG tablet Take 2 tablets (50 mg) by mouth at bedtime. 180 tablet 4     amLODIPine (NORVASC) 5 MG tablet Take 1 tablet (5 mg) by mouth daily. 90 tablet 3     atorvastatin (LIPITOR) 20 MG tablet Take 1 tablet (20 mg) by mouth daily. 90 tablet 3     Biotin 5000 MCG CAPS Take 1 tablet by mouth daily       cholecalciferol, vitamin D3, 1,000 unit tablet [CHOLECALCIFEROL, VITAMIN D3, 1,000 UNIT TABLET] Take 2,000 Units by mouth daily.        estradiol-norethindrone (ACTIVELLA) 1-0.5 MG tablet Take 1 tablet by mouth once daily 84 tablet 3     fluticasone (ARNUITY ELLIPTA) 200 MCG/ACT inhaler Inhale 1 puff into the lungs daily. 11 each 1     loratadine (CLARITIN) 10 MG tablet        metoprolol succinate ER (TOPROL XL) 25 MG 24 hr tablet Take 2 tablets by mouth once daily 180 tablet 3     pantoprazole (PROTONIX) 20 MG EC tablet Take 1 tablet (20 mg) by mouth daily. 90 tablet 3       Allergies   Allergen Reactions     Sulfamethoxazole-Trimethoprim Rash     Acyclovir      Other Reaction(s): G I problems with oral acyclov     Adhesive [Cyanoacrylate] Unknown     Contact allergy     Aspirin (Tartrazine Only)  "[Tartrazine] Unknown     Abdominal pain       Bee Venom Unknown     Cefuroxime Axetil [Cefuroxime] Unknown     Rash     Cephalosporins Unknown     Rash--ceftin     Chloral Hydrate Analogues [Chloral Hydrate] Unknown     Contact allergy     Chlorhexidine Gluconate [Chlorhexidine] Unknown     Contact allergy     Clindamycin Unknown     C diff     Doxepin Unknown     hyper     Ergot Alkaloids [Ergoloid Mesylates] Unknown     Erythromycin Base [Erythromycin] Unknown     Stomach pain     Formaldehyde Hives     Hydroxyzine Unknown     Ibuprofen GI Disturbance     Latex Unknown     Morphine Unknown     Not effective     Nickel Unknown     Nortriptyline Unknown     hyper     Pregabalin Unknown     Pseudoephedrine Unknown     Ranitidine      Other Reaction(s): GI problems, diarrhea     Sulfamethoxazole      Terconazole Unknown     Tramadol Unknown     Trazodone Nausea and Vomiting     Trimethoprim      Vancomycin Diarrhea     Venlafaxine Unknown     Vistaril [Hydroxyzine] Unknown     Adhesive Tape Rash     Amoxil [Amoxicillin] Rash     Menthyl Valerate Rash     Metronidazole Rash     Rash and neuropathy     Quaternium-15 Rash          Lab Results    Chemistry/lipid CBC Cardiac Enzymes/BNP/TSH/INR   Recent Labs   Lab Test 07/21/23  1030   CHOL 183   HDL 57   *   TRIG 55     Recent Labs   Lab Test 07/21/23  1030 09/28/22  0954 05/09/22  0915   * 132* 86     Recent Labs   Lab Test 07/21/23  1030      POTASSIUM 4.3   CHLORIDE 106   CO2 22   *   BUN 11.3   CR 0.96*   GFRESTIMATED 61   DOMITILA 9.2     Recent Labs   Lab Test 07/21/23  1030 06/19/23  1013 09/28/22  0954   CR 0.96* 1.1 1.04*     No results for input(s): \"A1C\" in the last 57345 hours.       Recent Labs   Lab Test 07/21/23  1030   WBC 9.1   HGB 14.0   HCT 41.8   MCV 97        Recent Labs   Lab Test 07/21/23  1030 09/28/22  0954 05/09/22  0915   HGB 14.0 14.9 13.5    No results for input(s): \"TROPONINI\" in the last 44573 hours.  No results " "for input(s): \"BNP\", \"NTBNPI\", \"NTBNP\" in the last 06358 hours.  Recent Labs   Lab Test 07/21/23  1030   TSH 1.57     No results for input(s): \"INR\" in the last 43756 hours.       Eleanor Coyle PA-C                                         Thank you for allowing me to participate in the care of your patient.      Sincerely,     Eleanor Coyle PA-C     [facility]  cc:   Eleanor Coyle PA-C  [address] [place] [id number]      "

## 2025-07-22 ENCOUNTER — TRANSFERRED RECORDS (OUTPATIENT)
Dept: HEALTH INFORMATION MANAGEMENT | Facility: CLINIC | Age: 80
End: 2025-07-22
Payer: COMMERCIAL

## 2025-07-23 ENCOUNTER — MYC MEDICAL ADVICE (OUTPATIENT)
Dept: FAMILY MEDICINE | Facility: CLINIC | Age: 80
End: 2025-07-23
Payer: COMMERCIAL

## 2025-07-23 ENCOUNTER — TELEPHONE (OUTPATIENT)
Dept: FAMILY MEDICINE | Facility: CLINIC | Age: 80
End: 2025-07-23
Payer: COMMERCIAL

## 2025-07-23 DIAGNOSIS — J45.30 MILD PERSISTENT ASTHMA WITHOUT COMPLICATION: Primary | ICD-10-CM

## 2025-07-23 DIAGNOSIS — J45.30 MILD PERSISTENT ASTHMA WITHOUT COMPLICATION: ICD-10-CM

## 2025-07-23 DIAGNOSIS — F51.01 PRIMARY INSOMNIA: ICD-10-CM

## 2025-07-23 RX ORDER — FLUTICASONE FUROATE 200 UG/1
1 POWDER RESPIRATORY (INHALATION) DAILY
Qty: 90 EACH | Refills: 4 | Status: SHIPPED | OUTPATIENT
Start: 2025-07-23

## 2025-07-23 RX ORDER — FLUTICASONE PROPIONATE 220 UG/1
1 AEROSOL, METERED RESPIRATORY (INHALATION) 2 TIMES DAILY
Qty: 36 G | Refills: 4 | Status: SHIPPED | OUTPATIENT
Start: 2025-07-23

## 2025-07-23 NOTE — TELEPHONE ENCOUNTER
Medication Question or Refill    Contacts       Contact Date/Time Type Contact Phone/Fax    07/23/2025 01:56 PM CDT Phone (Incoming) [id number] - [place], [place] - [address] (Pharmacy) [phone]     Anjel            What medication are you calling about (include dose and sig)?: Arnuity Ellipta 200 MCG/ ACT inhaler    Preferred Pharmacy:   [id number] - [place], [place] - [address]  [address] [address]  Phone: [phone] Fax: [phone]      Controlled Substance Agreement on file:   CSA -- Patient Level:    CSA: None found at the patient level.       Who prescribed the medication?:     Do you need a refill? Yes    When did you use the medication last?     Patient offered an appointment? No    Do you have any questions or concerns?  Yes: Per Anjel at pharmacy need clarification on dispense 11 each. Please resubmit new Rx.

## 2025-08-06 ENCOUNTER — MYC MEDICAL ADVICE (OUTPATIENT)
Dept: FAMILY MEDICINE | Facility: CLINIC | Age: 80
End: 2025-08-06
Payer: COMMERCIAL

## 2025-08-06 DIAGNOSIS — E78.2 MIXED HYPERLIPIDEMIA: ICD-10-CM

## 2025-08-11 RX ORDER — ATORVASTATIN CALCIUM 20 MG/1
20 TABLET, FILM COATED ORAL DAILY
Qty: 90 TABLET | Refills: 2 | Status: SHIPPED | OUTPATIENT
Start: 2025-08-11

## 2025-08-27 ENCOUNTER — OFFICE VISIT (OUTPATIENT)
Dept: FAMILY MEDICINE | Facility: CLINIC | Age: 80
End: 2025-08-27
Payer: COMMERCIAL

## 2025-08-27 VITALS
WEIGHT: 146.8 LBS | TEMPERATURE: 98.3 F | BODY MASS INDEX: 27.72 KG/M2 | RESPIRATION RATE: 18 BRPM | SYSTOLIC BLOOD PRESSURE: 124 MMHG | HEART RATE: 75 BPM | HEIGHT: 61 IN | OXYGEN SATURATION: 98 % | DIASTOLIC BLOOD PRESSURE: 68 MMHG

## 2025-08-27 DIAGNOSIS — I10 ESSENTIAL HYPERTENSION: ICD-10-CM

## 2025-08-27 DIAGNOSIS — N18.31 STAGE 3A CHRONIC KIDNEY DISEASE (H): ICD-10-CM

## 2025-08-27 DIAGNOSIS — E78.2 MIXED HYPERLIPIDEMIA: ICD-10-CM

## 2025-08-27 DIAGNOSIS — J45.30 MILD PERSISTENT ASTHMA WITHOUT COMPLICATION: ICD-10-CM

## 2025-08-27 DIAGNOSIS — M67.432 GANGLION CYST OF WRIST, LEFT: ICD-10-CM

## 2025-08-27 DIAGNOSIS — Z79.890 NEED FOR PROPHYLACTIC HORMONE REPLACEMENT THERAPY (POSTMENOPAUSAL): ICD-10-CM

## 2025-08-27 DIAGNOSIS — Z01.818 PREOP GENERAL PHYSICAL EXAM: Primary | ICD-10-CM

## 2025-08-27 LAB
ERYTHROCYTE [DISTWIDTH] IN BLOOD BY AUTOMATED COUNT: 14.6 % (ref 10–15)
HCT VFR BLD AUTO: 39.2 % (ref 35–47)
HGB BLD-MCNC: 13.2 G/DL (ref 11.7–15.7)
MCH RBC QN AUTO: 31.9 PG (ref 26.5–33)
MCHC RBC AUTO-ENTMCNC: 33.7 G/DL (ref 31.5–36.5)
MCV RBC AUTO: 94.7 FL (ref 78–100)
PLATELET # BLD AUTO: 227 10E3/UL (ref 150–450)
RBC # BLD AUTO: 4.14 10E6/UL (ref 3.8–5.2)
WBC # BLD AUTO: 9.31 10E3/UL (ref 4–11)

## 2025-08-27 PROCEDURE — 1126F AMNT PAIN NOTED NONE PRSNT: CPT | Performed by: FAMILY MEDICINE

## 2025-08-27 PROCEDURE — 85027 COMPLETE CBC AUTOMATED: CPT | Performed by: FAMILY MEDICINE

## 2025-08-27 PROCEDURE — 36415 COLL VENOUS BLD VENIPUNCTURE: CPT | Performed by: FAMILY MEDICINE

## 2025-08-27 PROCEDURE — 3074F SYST BP LT 130 MM HG: CPT | Performed by: FAMILY MEDICINE

## 2025-08-27 PROCEDURE — 80048 BASIC METABOLIC PNL TOTAL CA: CPT | Performed by: FAMILY MEDICINE

## 2025-08-27 PROCEDURE — 3078F DIAST BP <80 MM HG: CPT | Performed by: FAMILY MEDICINE

## 2025-08-27 PROCEDURE — 99214 OFFICE O/P EST MOD 30 MIN: CPT | Performed by: FAMILY MEDICINE

## 2025-08-27 RX ORDER — FLUTICASONE PROPIONATE 220 UG/1
1 AEROSOL, METERED RESPIRATORY (INHALATION) 2 TIMES DAILY
Qty: 36 G | Refills: 4 | Status: SHIPPED | OUTPATIENT
Start: 2025-08-27

## 2025-08-27 ASSESSMENT — ASTHMA QUESTIONNAIRES
QUESTION_3 LAST FOUR WEEKS HOW OFTEN DID YOUR ASTHMA SYMPTOMS (WHEEZING, COUGHING, SHORTNESS OF BREATH, CHEST TIGHTNESS OR PAIN) WAKE YOU UP AT NIGHT OR EARLIER THAN USUAL IN THE MORNING: NOT AT ALL
QUESTION_2 LAST FOUR WEEKS HOW OFTEN HAVE YOU HAD SHORTNESS OF BREATH: ONCE OR TWICE A WEEK
QUESTION_5 LAST FOUR WEEKS HOW WOULD YOU RATE YOUR ASTHMA CONTROL: SOMEWHAT CONTROLLED
QUESTION_1 LAST FOUR WEEKS HOW MUCH OF THE TIME DID YOUR ASTHMA KEEP YOU FROM GETTING AS MUCH DONE AT WORK, SCHOOL OR AT HOME: SOME OF THE TIME
QUESTION_4 LAST FOUR WEEKS HOW OFTEN HAVE YOU USED YOUR RESCUE INHALER OR NEBULIZER MEDICATION (SUCH AS ALBUTEROL): NOT AT ALL
ACT_TOTALSCORE: 20

## 2025-08-27 ASSESSMENT — PAIN SCALES - GENERAL: PAINLEVEL_OUTOF10: NO PAIN (0)

## 2025-08-28 LAB
ANION GAP SERPL CALCULATED.3IONS-SCNC: 10 MMOL/L (ref 7–15)
BUN SERPL-MCNC: 14.4 MG/DL (ref 8–23)
CALCIUM SERPL-MCNC: 9.6 MG/DL (ref 8.8–10.4)
CHLORIDE SERPL-SCNC: 105 MMOL/L (ref 98–107)
CREAT SERPL-MCNC: 1.05 MG/DL (ref 0.51–0.95)
EGFRCR SERPLBLD CKD-EPI 2021: 54 ML/MIN/1.73M2
GLUCOSE SERPL-MCNC: 107 MG/DL (ref 70–99)
HCO3 SERPL-SCNC: 25 MMOL/L (ref 22–29)
POTASSIUM SERPL-SCNC: 4 MMOL/L (ref 3.4–5.3)
SODIUM SERPL-SCNC: 140 MMOL/L (ref 135–145)

## 2025-09-03 ENCOUNTER — PATIENT OUTREACH (OUTPATIENT)
Dept: CARE COORDINATION | Facility: CLINIC | Age: 80
End: 2025-09-03
Payer: COMMERCIAL